# Patient Record
Sex: FEMALE | Race: WHITE | Employment: OTHER | URBAN - METROPOLITAN AREA
[De-identification: names, ages, dates, MRNs, and addresses within clinical notes are randomized per-mention and may not be internally consistent; named-entity substitution may affect disease eponyms.]

---

## 2017-03-15 ENCOUNTER — OFFICE VISIT (OUTPATIENT)
Dept: PULMONOLOGY | Age: 72
End: 2017-03-15
Payer: MEDICARE

## 2017-03-15 VITALS
HEART RATE: 115 BPM | RESPIRATION RATE: 16 BRPM | SYSTOLIC BLOOD PRESSURE: 132 MMHG | DIASTOLIC BLOOD PRESSURE: 69 MMHG | WEIGHT: 216 LBS | HEIGHT: 60 IN | BODY MASS INDEX: 42.41 KG/M2 | OXYGEN SATURATION: 16 %

## 2017-03-15 DIAGNOSIS — J30.9 ALLERGIC RHINITIS, UNSPECIFIED ALLERGIC RHINITIS TRIGGER, UNSPECIFIED RHINITIS SEASONALITY: ICD-10-CM

## 2017-03-15 DIAGNOSIS — Z99.89 OSA ON CPAP: ICD-10-CM

## 2017-03-15 DIAGNOSIS — R05.3 CHRONIC COUGH: ICD-10-CM

## 2017-03-15 DIAGNOSIS — J45.40 MODERATE PERSISTENT ASTHMA WITHOUT COMPLICATION: Primary | ICD-10-CM

## 2017-03-15 DIAGNOSIS — G47.33 OSA ON CPAP: ICD-10-CM

## 2017-03-15 PROCEDURE — G8427 DOCREV CUR MEDS BY ELIG CLIN: HCPCS | Performed by: INTERNAL MEDICINE

## 2017-03-15 PROCEDURE — G8484 FLU IMMUNIZE NO ADMIN: HCPCS | Performed by: INTERNAL MEDICINE

## 2017-03-15 PROCEDURE — 1123F ACP DISCUSS/DSCN MKR DOCD: CPT | Performed by: INTERNAL MEDICINE

## 2017-03-15 PROCEDURE — 3017F COLORECTAL CA SCREEN DOC REV: CPT | Performed by: INTERNAL MEDICINE

## 2017-03-15 PROCEDURE — G8417 CALC BMI ABV UP PARAM F/U: HCPCS | Performed by: INTERNAL MEDICINE

## 2017-03-15 PROCEDURE — 3014F SCREEN MAMMO DOC REV: CPT | Performed by: INTERNAL MEDICINE

## 2017-03-15 PROCEDURE — 1036F TOBACCO NON-USER: CPT | Performed by: INTERNAL MEDICINE

## 2017-03-15 PROCEDURE — G8400 PT W/DXA NO RESULTS DOC: HCPCS | Performed by: INTERNAL MEDICINE

## 2017-03-15 PROCEDURE — 99214 OFFICE O/P EST MOD 30 MIN: CPT | Performed by: INTERNAL MEDICINE

## 2017-03-15 PROCEDURE — 4040F PNEUMOC VAC/ADMIN/RCVD: CPT | Performed by: INTERNAL MEDICINE

## 2017-03-15 PROCEDURE — 1090F PRES/ABSN URINE INCON ASSESS: CPT | Performed by: INTERNAL MEDICINE

## 2017-03-15 RX ORDER — FLUTICASONE PROPIONATE 110 UG/1
2 AEROSOL, METERED RESPIRATORY (INHALATION) 2 TIMES DAILY
Qty: 1 INHALER | Refills: 11 | Status: SHIPPED | OUTPATIENT
Start: 2017-03-15 | End: 2018-03-21 | Stop reason: SDUPTHER

## 2017-03-15 RX ORDER — FLUTICASONE PROPIONATE 50 MCG
2 SPRAY, SUSPENSION (ML) NASAL DAILY
Qty: 1 BOTTLE | Refills: 11 | Status: SHIPPED | OUTPATIENT
Start: 2017-03-15 | End: 2018-03-21 | Stop reason: SDUPTHER

## 2017-03-15 RX ORDER — ACETAMINOPHEN 500 MG
500 TABLET ORAL
COMMUNITY

## 2017-03-15 RX ORDER — ALBUTEROL SULFATE 90 UG/1
2 AEROSOL, METERED RESPIRATORY (INHALATION) EVERY 6 HOURS PRN
Qty: 1 INHALER | Refills: 11 | Status: SHIPPED | OUTPATIENT
Start: 2017-03-15 | End: 2019-03-27 | Stop reason: SDUPTHER

## 2017-03-15 RX ORDER — FLUTICASONE PROPIONATE 50 MCG
2 SPRAY, SUSPENSION (ML) NASAL DAILY
COMMUNITY
Start: 2017-02-01 | End: 2017-03-15 | Stop reason: SDUPTHER

## 2017-05-04 ENCOUNTER — OFFICE VISIT (OUTPATIENT)
Dept: INTERNAL MEDICINE | Age: 72
End: 2017-05-04
Payer: MEDICARE

## 2017-05-04 VITALS
DIASTOLIC BLOOD PRESSURE: 77 MMHG | OXYGEN SATURATION: 95 % | WEIGHT: 211 LBS | SYSTOLIC BLOOD PRESSURE: 133 MMHG | BODY MASS INDEX: 41.43 KG/M2 | HEART RATE: 104 BPM | HEIGHT: 60 IN

## 2017-05-04 DIAGNOSIS — Z12.31 ENCOUNTER FOR SCREENING MAMMOGRAM FOR MALIGNANT NEOPLASM OF BREAST: ICD-10-CM

## 2017-05-04 DIAGNOSIS — J45.909 UNCOMPLICATED ASTHMA, UNSPECIFIED ASTHMA SEVERITY: ICD-10-CM

## 2017-05-04 DIAGNOSIS — Z13.220 SCREENING FOR HYPERLIPIDEMIA: ICD-10-CM

## 2017-05-04 DIAGNOSIS — I65.23 BILATERAL CAROTID ARTERY STENOSIS: ICD-10-CM

## 2017-05-04 DIAGNOSIS — Z12.11 COLON CANCER SCREENING: ICD-10-CM

## 2017-05-04 DIAGNOSIS — I10 ESSENTIAL HYPERTENSION: ICD-10-CM

## 2017-05-04 DIAGNOSIS — Z12.39 BREAST CANCER SCREENING: ICD-10-CM

## 2017-05-04 DIAGNOSIS — Z13.820 OSTEOPOROSIS SCREENING: ICD-10-CM

## 2017-05-04 DIAGNOSIS — E11.9 TYPE 2 DIABETES MELLITUS WITHOUT COMPLICATION, UNSPECIFIED LONG TERM INSULIN USE STATUS: Primary | ICD-10-CM

## 2017-05-04 DIAGNOSIS — R32 URINARY INCONTINENCE, UNSPECIFIED TYPE: ICD-10-CM

## 2017-05-04 LAB — HBA1C MFR BLD: 7.1 %

## 2017-05-04 PROCEDURE — 3046F HEMOGLOBIN A1C LEVEL >9.0%: CPT | Performed by: INTERNAL MEDICINE

## 2017-05-04 PROCEDURE — 1123F ACP DISCUSS/DSCN MKR DOCD: CPT | Performed by: INTERNAL MEDICINE

## 2017-05-04 PROCEDURE — 3017F COLORECTAL CA SCREEN DOC REV: CPT | Performed by: INTERNAL MEDICINE

## 2017-05-04 PROCEDURE — 83036 HEMOGLOBIN GLYCOSYLATED A1C: CPT | Performed by: INTERNAL MEDICINE

## 2017-05-04 PROCEDURE — 4040F PNEUMOC VAC/ADMIN/RCVD: CPT | Performed by: INTERNAL MEDICINE

## 2017-05-04 PROCEDURE — 3014F SCREEN MAMMO DOC REV: CPT | Performed by: INTERNAL MEDICINE

## 2017-05-04 PROCEDURE — G8598 ASA/ANTIPLAT THER USED: HCPCS | Performed by: INTERNAL MEDICINE

## 2017-05-04 PROCEDURE — G8427 DOCREV CUR MEDS BY ELIG CLIN: HCPCS | Performed by: INTERNAL MEDICINE

## 2017-05-04 PROCEDURE — 99203 OFFICE O/P NEW LOW 30 MIN: CPT | Performed by: INTERNAL MEDICINE

## 2017-05-04 PROCEDURE — 1090F PRES/ABSN URINE INCON ASSESS: CPT | Performed by: INTERNAL MEDICINE

## 2017-05-04 PROCEDURE — G8400 PT W/DXA NO RESULTS DOC: HCPCS | Performed by: INTERNAL MEDICINE

## 2017-05-04 PROCEDURE — 0509F URINE INCON PLAN DOCD: CPT | Performed by: INTERNAL MEDICINE

## 2017-05-04 PROCEDURE — G8417 CALC BMI ABV UP PARAM F/U: HCPCS | Performed by: INTERNAL MEDICINE

## 2017-05-04 PROCEDURE — 1036F TOBACCO NON-USER: CPT | Performed by: INTERNAL MEDICINE

## 2017-05-04 ASSESSMENT — ENCOUNTER SYMPTOMS
ABDOMINAL PAIN: 0
SHORTNESS OF BREATH: 0
SORE THROAT: 0
PHOTOPHOBIA: 0
COUGH: 0
BACK PAIN: 1
ABDOMINAL DISTENTION: 0

## 2017-05-04 ASSESSMENT — PATIENT HEALTH QUESTIONNAIRE - PHQ9
SUM OF ALL RESPONSES TO PHQ9 QUESTIONS 1 & 2: 1
1. LITTLE INTEREST OR PLEASURE IN DOING THINGS: 0
2. FEELING DOWN, DEPRESSED OR HOPELESS: 1
SUM OF ALL RESPONSES TO PHQ QUESTIONS 1-9: 1

## 2017-05-18 RX ORDER — CITALOPRAM 40 MG/1
40 TABLET ORAL DAILY
Qty: 30 TABLET | Refills: 3 | Status: SHIPPED | OUTPATIENT
Start: 2017-05-18 | End: 2017-09-22 | Stop reason: SDUPTHER

## 2017-06-10 ENCOUNTER — HOSPITAL ENCOUNTER (OUTPATIENT)
Age: 72
Discharge: HOME | End: 2017-06-10
Payer: MEDICARE

## 2017-06-10 ENCOUNTER — HOSPITAL ENCOUNTER (OUTPATIENT)
Dept: MAMMOGRAPHY | Age: 72
Discharge: HOME | End: 2017-06-10
Payer: MEDICARE

## 2017-06-10 DIAGNOSIS — Z13.820 OSTEOPOROSIS SCREENING: ICD-10-CM

## 2017-06-10 DIAGNOSIS — Z12.39 BREAST CANCER SCREENING: ICD-10-CM

## 2017-06-10 DIAGNOSIS — Z12.31 ENCOUNTER FOR SCREENING MAMMOGRAM FOR MALIGNANT NEOPLASM OF BREAST: ICD-10-CM

## 2017-06-10 LAB
CREATININE URINE: 339.9 MG/DL (ref 28–217)
MICROALBUMIN/CREAT 24H UR: 68 MG/L
MICROALBUMIN/CREAT UR-RTO: 20 MCG/MG CREAT

## 2017-06-10 PROCEDURE — 82043 UR ALBUMIN QUANTITATIVE: CPT

## 2017-06-10 PROCEDURE — 82570 ASSAY OF URINE CREATININE: CPT

## 2017-06-10 PROCEDURE — 77080 DXA BONE DENSITY AXIAL: CPT

## 2017-06-10 PROCEDURE — 77063 BREAST TOMOSYNTHESIS BI: CPT

## 2017-08-08 RX ORDER — GLIMEPIRIDE 4 MG/1
4 TABLET ORAL
Qty: 90 TABLET | Refills: 3 | Status: SHIPPED | OUTPATIENT
Start: 2017-08-08 | End: 2018-09-04 | Stop reason: SDUPTHER

## 2017-09-20 ENCOUNTER — OFFICE VISIT (OUTPATIENT)
Dept: PULMONOLOGY | Age: 72
End: 2017-09-20
Payer: MEDICARE

## 2017-09-20 VITALS
BODY MASS INDEX: 41.03 KG/M2 | DIASTOLIC BLOOD PRESSURE: 64 MMHG | HEART RATE: 108 BPM | RESPIRATION RATE: 20 BRPM | OXYGEN SATURATION: 98 % | HEIGHT: 60 IN | SYSTOLIC BLOOD PRESSURE: 116 MMHG | WEIGHT: 209 LBS

## 2017-09-20 DIAGNOSIS — J30.89 NON-SEASONAL ALLERGIC RHINITIS, UNSPECIFIED ALLERGIC RHINITIS TRIGGER: ICD-10-CM

## 2017-09-20 DIAGNOSIS — R05.3 CHRONIC COUGH: ICD-10-CM

## 2017-09-20 DIAGNOSIS — J45.40 MODERATE PERSISTENT ASTHMA WITHOUT COMPLICATION: Primary | ICD-10-CM

## 2017-09-20 DIAGNOSIS — G47.33 OSA (OBSTRUCTIVE SLEEP APNEA): ICD-10-CM

## 2017-09-20 PROCEDURE — 3014F SCREEN MAMMO DOC REV: CPT | Performed by: INTERNAL MEDICINE

## 2017-09-20 PROCEDURE — G8427 DOCREV CUR MEDS BY ELIG CLIN: HCPCS | Performed by: INTERNAL MEDICINE

## 2017-09-20 PROCEDURE — 1036F TOBACCO NON-USER: CPT | Performed by: INTERNAL MEDICINE

## 2017-09-20 PROCEDURE — 99214 OFFICE O/P EST MOD 30 MIN: CPT | Performed by: INTERNAL MEDICINE

## 2017-09-20 PROCEDURE — 3017F COLORECTAL CA SCREEN DOC REV: CPT | Performed by: INTERNAL MEDICINE

## 2017-09-20 PROCEDURE — 4040F PNEUMOC VAC/ADMIN/RCVD: CPT | Performed by: INTERNAL MEDICINE

## 2017-09-20 PROCEDURE — 90688 IIV4 VACCINE SPLT 0.5 ML IM: CPT | Performed by: INTERNAL MEDICINE

## 2017-09-20 PROCEDURE — G0008 ADMIN INFLUENZA VIRUS VAC: HCPCS | Performed by: INTERNAL MEDICINE

## 2017-09-20 PROCEDURE — G8598 ASA/ANTIPLAT THER USED: HCPCS | Performed by: INTERNAL MEDICINE

## 2017-09-20 PROCEDURE — G8417 CALC BMI ABV UP PARAM F/U: HCPCS | Performed by: INTERNAL MEDICINE

## 2017-09-20 PROCEDURE — 1090F PRES/ABSN URINE INCON ASSESS: CPT | Performed by: INTERNAL MEDICINE

## 2017-09-20 PROCEDURE — G8399 PT W/DXA RESULTS DOCUMENT: HCPCS | Performed by: INTERNAL MEDICINE

## 2017-09-20 PROCEDURE — 1123F ACP DISCUSS/DSCN MKR DOCD: CPT | Performed by: INTERNAL MEDICINE

## 2017-09-23 RX ORDER — CITALOPRAM 40 MG/1
TABLET ORAL
Qty: 30 TABLET | Refills: 3 | Status: SHIPPED | OUTPATIENT
Start: 2017-09-23 | End: 2018-01-30 | Stop reason: SDUPTHER

## 2017-10-02 RX ORDER — SIMVASTATIN 40 MG
40 TABLET ORAL NIGHTLY
Qty: 30 TABLET | Refills: 5 | Status: SHIPPED | OUTPATIENT
Start: 2017-10-02 | End: 2018-02-28 | Stop reason: SDUPTHER

## 2017-10-26 ENCOUNTER — OFFICE VISIT (OUTPATIENT)
Dept: INTERNAL MEDICINE | Age: 72
End: 2017-10-26
Payer: MEDICARE

## 2017-10-26 VITALS
HEART RATE: 99 BPM | BODY MASS INDEX: 41.21 KG/M2 | WEIGHT: 211 LBS | SYSTOLIC BLOOD PRESSURE: 128 MMHG | OXYGEN SATURATION: 96 % | DIASTOLIC BLOOD PRESSURE: 77 MMHG

## 2017-10-26 DIAGNOSIS — I10 ESSENTIAL HYPERTENSION: ICD-10-CM

## 2017-10-26 DIAGNOSIS — E11.9 TYPE 2 DIABETES MELLITUS WITHOUT COMPLICATION, UNSPECIFIED LONG TERM INSULIN USE STATUS: Primary | ICD-10-CM

## 2017-10-26 LAB — HBA1C MFR BLD: 7.4 %

## 2017-10-26 PROCEDURE — G8484 FLU IMMUNIZE NO ADMIN: HCPCS | Performed by: INTERNAL MEDICINE

## 2017-10-26 PROCEDURE — G8598 ASA/ANTIPLAT THER USED: HCPCS | Performed by: INTERNAL MEDICINE

## 2017-10-26 PROCEDURE — G8399 PT W/DXA RESULTS DOCUMENT: HCPCS | Performed by: INTERNAL MEDICINE

## 2017-10-26 PROCEDURE — G8427 DOCREV CUR MEDS BY ELIG CLIN: HCPCS | Performed by: INTERNAL MEDICINE

## 2017-10-26 PROCEDURE — 1036F TOBACCO NON-USER: CPT | Performed by: INTERNAL MEDICINE

## 2017-10-26 PROCEDURE — 1090F PRES/ABSN URINE INCON ASSESS: CPT | Performed by: INTERNAL MEDICINE

## 2017-10-26 PROCEDURE — 1123F ACP DISCUSS/DSCN MKR DOCD: CPT | Performed by: INTERNAL MEDICINE

## 2017-10-26 PROCEDURE — G8417 CALC BMI ABV UP PARAM F/U: HCPCS | Performed by: INTERNAL MEDICINE

## 2017-10-26 PROCEDURE — 83036 HEMOGLOBIN GLYCOSYLATED A1C: CPT | Performed by: INTERNAL MEDICINE

## 2017-10-26 PROCEDURE — 3045F PR MOST RECENT HEMOGLOBIN A1C LEVEL 7.0-9.0%: CPT | Performed by: INTERNAL MEDICINE

## 2017-10-26 PROCEDURE — 4040F PNEUMOC VAC/ADMIN/RCVD: CPT | Performed by: INTERNAL MEDICINE

## 2017-10-26 PROCEDURE — 3014F SCREEN MAMMO DOC REV: CPT | Performed by: INTERNAL MEDICINE

## 2017-10-26 PROCEDURE — 3017F COLORECTAL CA SCREEN DOC REV: CPT | Performed by: INTERNAL MEDICINE

## 2017-10-26 PROCEDURE — 99214 OFFICE O/P EST MOD 30 MIN: CPT | Performed by: INTERNAL MEDICINE

## 2017-10-26 ASSESSMENT — ENCOUNTER SYMPTOMS
ALLERGIC/IMMUNOLOGIC NEGATIVE: 1
GASTROINTESTINAL NEGATIVE: 1
EYES NEGATIVE: 1
BACK PAIN: 1
COUGH: 1

## 2017-10-26 NOTE — PROGRESS NOTES
[address] [facility]   Progress Note      Chronic Disease Visit Information    BP Readings from Last 3 Encounters:   09/20/17 116/64   05/04/17 133/77   03/15/17 132/69          Hemoglobin A1C (%)   Date Value   05/04/2017 7.1   01/28/2014 7.2 (H)     Microalb/Crt. Ratio (mcg/mg creat)   Date Value   06/10/2017 20     LDL Cholesterol (mg/dL)   Date Value   01/28/2014 66     HDL (mg/dL)   Date Value   01/28/2014 41 (L)     BUN (mg/dL)   Date Value   01/28/2014 17     CREATININE (mg/dL)   Date Value   01/28/2014 1.00     Glucose (mg/dL)   Date Value   01/28/2014 124 (H)   09/30/2011 85            Have you changed or started any medications since your last visit including any over-the-counter medicines, vitamins, or herbal medicines? no   Are you having any side effects from any of your medications? -  no  Have you stopped taking any of your medications? Is so, why? -  no    Have you seen any other physician or provider since your last visit? Yes - Records Obtained  Have you had any other diagnostic tests since your last visit? Yes - Records Obtained  Have you been seen in the emergency room and/or had an admission to a hospital since we last saw you? No  Have you had your annual diabetic retinal (eye) exam? Yes - Records Obtained  Have you had your routine dental cleaning in the past 6 months? no    Have you activated your [employer] account? If not, what are your barriers?  Yes     Patient Care Team:  Lnih Hussein MD as PCP - General (Internal Medicine)  Kade Manzanares MD as Consulting Physician (Pulmonology)         Medical History Review  Past Medical, Family, and Social History reviewed and does contribute to the patient presenting condition    Health Maintenance   Topic Date Due    Diabetic foot exam  07/03/1955    Diabetic retinal exam  07/03/1955    Zostavax vaccine  07/03/2005    Pneumococcal low/med risk (1 of 2 - PCV13) 07/03/2010    Lipid screen 01/28/2015    DTaP/Tdap/Td vaccine (1 - Tdap) 01/26/2018 (Originally 7/3/1964)    Hepatitis C screen  01/26/2018 (Originally 1945)    Diabetic hemoglobin A1C test  05/04/2018    Diabetic microalbuminuria test  06/10/2018    Breast cancer screen  06/10/2019    Colon cancer screen colonoscopy  07/07/2027    DEXA (modify frequency per FRAX score)  Completed    Flu vaccine  Completed         Date of patient's visit: 10/26/2017  Patient's Name:  Claudette Collado                   YOB: 1945        PCP:  Savannah Gonzalez MD    Claudette Collado is a 67 y.o. female who presents for   Chief Complaint   Patient presents with    6 Month Follow-Up    and follow up of chronic medical problems. Patient Active Problem List   Diagnosis    S/P total knee arthroplasty    Diabetes mellitus ([address]    Hypertension    Asthma    Arthritis    Hyperkalemia       HISTORY OF PRESENT ILLNESS:    History was obtained from the patient. Treatment Adherence:   Medication compliance:  compliant all of the time  Diet compliance:  compliant all of the time  Weight trend: stable  Current exercise: no regular exercise  Barriers: none    Diabetes Mellitus Type 2: Current symptoms/problems include none. Home blood sugar records: patient does not test  Any episodes of hypoglycemia? no  Eye exam current (within one year): unknown  Tobacco history: She  reports that she has quit smoking. She has quit using smokeless tobacco.   Daily Aspirin? Yes    Hypertension:  Home blood pressure monitoring: No.  She is adherent to a low sodium diet. Patient denies chest pain and shortness of breath. Antihypertensive medication side effects: no medication side effects noted. Use of agents associated with hypertension: none. Hyperlipidemia:  No new myalgias or GI upset on simvastatin (Zocor).        Lab Results   Component Value Date    LABA1C 7.1 05/04/2017    LABA1C 7.2 (H) 01/28/2014     Lab Results   Component Value Date    LABMICR 20 appearing, and in no distress  Mental status - alert, oriented to person, place, and time  Eyes - pupils equal and reactive, extraocular eye movements intact  Chest - clear to auscultation, no wheezes, rales or rhonchi, symmetric air entry  Heart - normal rate, regular rhythm, normal S1, S2, no murmurs, rubs, clicks or gallops  Abdomen - soft, nontender, nondistended, no masses or organomegaly  Back exam - full range of motion, no tenderness, palpable spasm or pain on motion  Neurological - alert, oriented, normal speech, no focal findings or movement disorder noted  Musculoskeletal - no joint tenderness, deformity or swelling  Extremities - peripheral pulses normal, no pedal edema, no clubbing or cyanosis    LABORATORY FINDINGS:    CBC:   Lab Results   Component Value Date    WBC 10.6 02/04/2014    HGB 9.9 02/04/2014     02/04/2014     BMP:    Lab Results   Component Value Date     01/28/2014    K 4.6 01/28/2014     01/28/2014    CO2 30 01/28/2014    BUN 17 01/28/2014    CREATININE 1.00 01/28/2014    GLUCOSE 124 01/28/2014    GLUCOSE 85 09/30/2011     Hemoglobin A1C:   Lab Results   Component Value Date    LABA1C 7.1 05/04/2017     Microalbumin Urine:   Lab Results   Component Value Date    MICROALBUR 68 06/10/2017     Lipid profile:   Lab Results   Component Value Date    CHOL 125 01/28/2014    TRIG 89 01/28/2014    HDL 41 01/28/2014     Thyroid functions: No results found for: TSH   Hepatic functions:   Lab Results   Component Value Date    LABALBU 3.3 01/28/2014     Urine Analysis: No results found for: [address] [place]:      Health Maintenance Due   Topic Date Due    Diabetic foot exam  07/03/1955    Diabetic retinal exam  07/03/1955    Pneumococcal low/med risk (1 of 2 - PCV13) 07/03/2010    Lipid screen  01/28/2015       ASSESSMENT AND PLAN:      1.  Type 2 diabetes mellitus without complication, unspecified long term insulin use status (HCC)  Worsened slightly with 7.4 A1C  The patient is asked to make an attempt to improve diet and exercise patterns to aid in medical management of this problem. 2. Essential hypertension  controlled     rtc in 6 months    FOLLOW UP:   1. Marta Speaks received counseling on the following healthy behaviors: nutrition and exercise    2. Reviewed prior labs and health maintenance. 3.  Discussed use, benefit, and side effects of prescribed medications. Barriers to medication compliance addressed. All patient questions answered. Pt voiced understanding. 4.  Continue current medications, diet and exercise. No orders of the defined types were placed in this encounter. Completed Refills               Requested Prescriptions      No prescriptions requested or ordered in this encounter       5. Patient given educational materials - see patient instructions    6. Was a self-tracking handout given in paper form or via [employer]? Yes  If yes, see orders or list here. No orders of the defined types were placed in this encounter. Return in about 6 months (around 4/26/2018). Patient voiced understanding and agreed to treatment plan. This note is created with the assistance of a speech-recognition program. While intending to generate a document that actually reflects the content of the visit, the document can still have some mistakes which may not have been identified and corrected by editing.     Dr Harris Hollis MD, [address]  Associate , Department of Internal Medicine  [address] [place] [address]  Attending [address] [place] [address], [address] [place] [address]                   10/26/2017, 3:07 PM

## 2017-12-14 RX ORDER — VALSARTAN AND HYDROCHLOROTHIAZIDE 160; 12.5 MG/1; MG/1
1 TABLET, FILM COATED ORAL DAILY
Qty: 90 TABLET | Refills: 0 | Status: SHIPPED | OUTPATIENT
Start: 2017-12-14 | End: 2018-03-23 | Stop reason: SDUPTHER

## 2017-12-14 NOTE — TELEPHONE ENCOUNTER
Requested Prescriptions     Pending Prescriptions Disp Refills    valsartan-hydrochlorothiazide (DIOVAN HCT) 160-12.5 MG per tablet 90 tablet 0     Sig: Take 1 tablet by mouth daily     Next Visit Date:  Future Appointments  Date Time Provider Eulalia Chongi   3/21/2018 1:45 PM Chandan Phelps MD Resp Spec [facility]   4/26/2018 11:10 AM Bogdan Roy MD [facility] [address] Maintenance   Topic Date Due    Diabetic foot exam  07/03/1955    Diabetic retinal exam  07/03/1955    Lipid screen  01/28/2015    Pneumococcal low/med risk (2 of 2 - PPSV23) 10/26/2017    DTaP/Tdap/Td vaccine (1 - Tdap) 01/26/2018 (Originally 7/3/1964)    Hepatitis C screen  01/26/2018 (Originally 1945)    Zostavax vaccine  10/26/2018 (Originally 7/3/2005)    Diabetic microalbuminuria test  06/10/2018    Diabetic hemoglobin A1C test  10/26/2018    Breast cancer screen  06/10/2019    Colon cancer screen colonoscopy  07/07/2027    DEXA (modify frequency per FRAX score)  Completed    Flu vaccine  Completed       Hemoglobin A1C (%)   Date Value   10/26/2017 7.4   05/04/2017 7.1   01/28/2014 7.2 (H)             ( goal A1C is < 7)   Microalb/Crt.  Ratio (mcg/mg creat)   Date Value   06/10/2017 20     LDL Cholesterol (mg/dL)   Date Value   01/28/2014 66       (goal LDL is <100)   BUN (mg/dL)   Date Value   01/28/2014 17     BP Readings from Last 3 Encounters:   10/26/17 128/77   09/20/17 116/64   05/04/17 133/77          (goal 120/80)    All Future Testing planned in [facility]  Lab Frequency Next Occurrence   Lipid Panel Once 04/04/2018               Patient Active Problem List:     S/P total knee arthroplasty     Diabetes mellitus ([facility]     Hypertension     Asthma     Arthritis     Hyperkalemia

## 2018-01-30 RX ORDER — CITALOPRAM 40 MG/1
TABLET ORAL
Qty: 30 TABLET | Refills: 5 | Status: SHIPPED | OUTPATIENT
Start: 2018-01-30 | End: 2018-08-13 | Stop reason: SDUPTHER

## 2018-01-30 NOTE — TELEPHONE ENCOUNTER
Health Maintenance   Topic Date Due    Hepatitis C screen  1945    Diabetic foot exam  07/03/1955    Diabetic retinal exam  07/03/1955    DTaP/Tdap/Td vaccine (1 - Tdap) 07/03/1964    Lipid screen  01/28/2015    Potassium monitoring  01/28/2015    Creatinine monitoring  01/28/2015    Pneumococcal low/med risk (2 of 2 - PPSV23) 10/26/2017    Zostavax vaccine  10/26/2018 (Originally 7/3/2005)    Diabetic microalbuminuria test  06/10/2018    A1C test (Diabetic or Prediabetic)  10/26/2018    Breast cancer screen  06/10/2019    Colon cancer screen colonoscopy  07/07/2027    DEXA (modify frequency per FRAX score)  Completed    Flu vaccine  Completed             (applicable per patient's age: Cancer Screenings, Depression Screening, Fall Risk Screening, Immunizations)    Hemoglobin A1C (%)   Date Value   10/26/2017 7.4   05/04/2017 7.1   01/28/2014 7.2 (H)     Microalb/Crt.  Ratio (mcg/mg creat)   Date Value   06/10/2017 20     LDL Cholesterol (mg/dL)   Date Value   01/28/2014 66     BUN (mg/dL)   Date Value   01/28/2014 17      (goal A1C is < 7)   (goal LDL is <100) need 30-50% reduction from baseline     BP Readings from Last 3 Encounters:   10/26/17 128/77   09/20/17 116/64   05/04/17 133/77    (goal /80)      All Future Testing planned in [facility]:  Lab Frequency Next Occurrence   Lipid Panel Once 04/04/2018       Next Visit Date:  Future Appointments  Date Time Provider Eulalia Chongi   3/21/2018 1:45 PM Lyla Toure MD Resp Spec [facility]   4/26/2018 11:10 AM Richar Chen MD [facility]            Patient Active Problem List:     S/P total knee arthroplasty     Diabetes mellitus ([address]     Hypertension     Asthma     Arthritis     Hyperkalemia

## 2018-02-28 RX ORDER — SIMVASTATIN 40 MG
40 TABLET ORAL NIGHTLY
Qty: 90 TABLET | Refills: 3 | Status: SHIPPED | OUTPATIENT
Start: 2018-02-28 | End: 2019-04-15 | Stop reason: SDUPTHER

## 2018-03-21 ENCOUNTER — OFFICE VISIT (OUTPATIENT)
Dept: PULMONOLOGY | Age: 73
End: 2018-03-21
Payer: MEDICARE

## 2018-03-21 VITALS
WEIGHT: 202.2 LBS | BODY MASS INDEX: 39.7 KG/M2 | RESPIRATION RATE: 14 BRPM | TEMPERATURE: 97.6 F | OXYGEN SATURATION: 97 % | HEIGHT: 60 IN | DIASTOLIC BLOOD PRESSURE: 76 MMHG | HEART RATE: 99 BPM | SYSTOLIC BLOOD PRESSURE: 132 MMHG

## 2018-03-21 DIAGNOSIS — J30.9 ALLERGIC RHINITIS, UNSPECIFIED CHRONICITY, UNSPECIFIED SEASONALITY, UNSPECIFIED TRIGGER: ICD-10-CM

## 2018-03-21 DIAGNOSIS — G47.33 OBSTRUCTIVE SLEEP APNEA: ICD-10-CM

## 2018-03-21 DIAGNOSIS — J45.30 MILD PERSISTENT ASTHMA WITHOUT COMPLICATION: Primary | ICD-10-CM

## 2018-03-21 PROCEDURE — G8417 CALC BMI ABV UP PARAM F/U: HCPCS | Performed by: INTERNAL MEDICINE

## 2018-03-21 PROCEDURE — G8482 FLU IMMUNIZE ORDER/ADMIN: HCPCS | Performed by: INTERNAL MEDICINE

## 2018-03-21 PROCEDURE — 4040F PNEUMOC VAC/ADMIN/RCVD: CPT | Performed by: INTERNAL MEDICINE

## 2018-03-21 PROCEDURE — 1123F ACP DISCUSS/DSCN MKR DOCD: CPT | Performed by: INTERNAL MEDICINE

## 2018-03-21 PROCEDURE — 3014F SCREEN MAMMO DOC REV: CPT | Performed by: INTERNAL MEDICINE

## 2018-03-21 PROCEDURE — 1036F TOBACCO NON-USER: CPT | Performed by: INTERNAL MEDICINE

## 2018-03-21 PROCEDURE — G8427 DOCREV CUR MEDS BY ELIG CLIN: HCPCS | Performed by: INTERNAL MEDICINE

## 2018-03-21 PROCEDURE — 99214 OFFICE O/P EST MOD 30 MIN: CPT | Performed by: INTERNAL MEDICINE

## 2018-03-21 PROCEDURE — G8399 PT W/DXA RESULTS DOCUMENT: HCPCS | Performed by: INTERNAL MEDICINE

## 2018-03-21 PROCEDURE — 1090F PRES/ABSN URINE INCON ASSESS: CPT | Performed by: INTERNAL MEDICINE

## 2018-03-21 PROCEDURE — 3017F COLORECTAL CA SCREEN DOC REV: CPT | Performed by: INTERNAL MEDICINE

## 2018-03-21 RX ORDER — FLUTICASONE PROPIONATE 110 UG/1
2 AEROSOL, METERED RESPIRATORY (INHALATION) 2 TIMES DAILY
Qty: 1 INHALER | Refills: 11 | Status: SHIPPED | OUTPATIENT
Start: 2018-03-21 | End: 2019-03-27 | Stop reason: SDUPTHER

## 2018-03-21 RX ORDER — FLUTICASONE PROPIONATE 50 MCG
2 SPRAY, SUSPENSION (ML) NASAL DAILY
Qty: 1 BOTTLE | Refills: 11 | Status: SHIPPED | OUTPATIENT
Start: 2018-03-21 | End: 2019-03-27 | Stop reason: SDUPTHER

## 2018-03-21 RX ORDER — ALBUTEROL SULFATE 90 UG/1
2 AEROSOL, METERED RESPIRATORY (INHALATION) EVERY 6 HOURS PRN
Qty: 1 INHALER | Refills: 5 | Status: SHIPPED | OUTPATIENT
Start: 2018-03-21 | End: 2018-04-30 | Stop reason: SDUPTHER

## 2018-03-21 NOTE — PROGRESS NOTES
Value Ref Range Status   01/28/2014 105 98 - 110 mmol/L Final     CO2   Date Value Ref Range Status   01/28/2014 30 20 - 31 mmol/L Final     BUN   Date Value Ref Range Status   01/28/2014 17 5 - 20 mg/dL Final     CREATININE   Date Value Ref Range Status   01/28/2014 1.00 0.4 - 1.0 mg/dL Final   01/27/2014 1.08 (H) 0.4 - 1.0 mg/dL Final   01/26/2014 1.29 (H) 0.4 - 1.0 mg/dL Final     Glucose   Date Value Ref Range Status   01/28/2014 124 (H) 74 - 106 mg/dL Final   09/30/2011 85 74 - 106 mg/dL Final     S. Calcium:   Calcium   Date Value Ref Range Status   01/28/2014 8.7 8.6 - 10.4 mg/dL Final     S. Ionized Calcium: No results found for: IONCA  S. Magnesium: No results found for: MG  S. Phosphorus:   Phosphorus   Date Value Ref Range Status   01/28/2014 2.7 2.5 - 4.5 mg/dL Final     S. Glucose:   POC Glucose   Date Value Ref Range Status   01/27/2014 153 (H) 65 - 105 mg/dL Final   01/27/2014 112 (H) 65 - 105 mg/dL Final   01/27/2014 116 (H) 65 - 105 mg/dL Final     Glycosylated hemoglobin A1C:   Hemoglobin A1C   Date Value Ref Range Status   10/26/2017 7.4 % Final       Pulmonary Functions Testing Results:    3/19/14: FEV1 1.63  96%, FVC 2.16  89%, QJQ6QBI 107 %, TLC 4.34  108% , DLCO 16.49 98%    Blood Gases: No results found for: PH, PCO2, PO2, HCO3, O2SAT    Radiological reports:    CXR  normal chest X-ray in 2014    CT Scans        ECHO:     Immunization History   Administered Date(s) Administered    Influenza, Quadv, 3 Years and older, IM 09/20/2017    Influenza, Triv, 3 years and older, IM 09/14/2016    Pneumococcal 13-valent Conjugate ([id number]) 10/26/2016    Pneumococcal Polysaccharide ([id number]) 10/08/2008       Assessment:    ICD-10-CM ICD-9-CM    1. Mild persistent asthma without complication I38.55 540.30    2. Obstructive sleep apnea G47.33 327.23    3.  Allergic rhinitis, unspecified chronicity, unspecified seasonality, unspecified trigger J30.9 477.9          Plan:    Continue Flovent at

## 2018-03-26 RX ORDER — VALSARTAN AND HYDROCHLOROTHIAZIDE 160; 12.5 MG/1; MG/1
TABLET, FILM COATED ORAL
Qty: 90 TABLET | Refills: 3 | Status: SHIPPED | OUTPATIENT
Start: 2018-03-26 | End: 2019-04-18 | Stop reason: SDUPTHER

## 2018-04-17 ENCOUNTER — HOSPITAL ENCOUNTER (OUTPATIENT)
Age: 73
Discharge: HOME | End: 2018-04-17
Payer: MEDICARE

## 2018-04-17 DIAGNOSIS — Z13.220 SCREENING FOR HYPERLIPIDEMIA: ICD-10-CM

## 2018-04-17 LAB
CHOLESTEROL/HDL RATIO: 1.9
CHOLESTEROL: 147 MG/DL
HDLC SERPL-MCNC: 78 MG/DL
LDL CHOLESTEROL: 46 MG/DL (ref 0–130)
TRIGL SERPL-MCNC: 114 MG/DL
VLDLC SERPL CALC-MCNC: NORMAL MG/DL (ref 1–30)

## 2018-04-17 PROCEDURE — 80061 LIPID PANEL: CPT

## 2018-04-17 PROCEDURE — 36415 COLL VENOUS BLD VENIPUNCTURE: CPT

## 2018-04-27 PROBLEM — M65.30 ACQUIRED TRIGGER FINGER: Status: ACTIVE | Noted: 2018-04-27

## 2018-04-27 PROBLEM — M17.9 OSTEOARTHRITIS OF KNEE: Status: ACTIVE | Noted: 2018-04-27

## 2018-04-30 ENCOUNTER — OFFICE VISIT (OUTPATIENT)
Dept: INTERNAL MEDICINE | Age: 73
End: 2018-04-30
Payer: MEDICARE

## 2018-04-30 VITALS
RESPIRATION RATE: 12 BRPM | SYSTOLIC BLOOD PRESSURE: 111 MMHG | DIASTOLIC BLOOD PRESSURE: 74 MMHG | BODY MASS INDEX: 40.44 KG/M2 | HEIGHT: 60 IN | OXYGEN SATURATION: 97 % | HEART RATE: 88 BPM | WEIGHT: 206 LBS

## 2018-04-30 DIAGNOSIS — I10 ESSENTIAL HYPERTENSION: ICD-10-CM

## 2018-04-30 DIAGNOSIS — Z23 NEED FOR PROPHYLACTIC VACCINATION AND INOCULATION AGAINST VARICELLA: ICD-10-CM

## 2018-04-30 DIAGNOSIS — Z12.39 BREAST CANCER SCREENING: ICD-10-CM

## 2018-04-30 DIAGNOSIS — M17.0 OSTEOARTHRITIS OF BOTH KNEES, UNSPECIFIED OSTEOARTHRITIS TYPE: ICD-10-CM

## 2018-04-30 DIAGNOSIS — E11.9 TYPE 2 DIABETES MELLITUS WITHOUT COMPLICATION, UNSPECIFIED LONG TERM INSULIN USE STATUS: Primary | ICD-10-CM

## 2018-04-30 LAB
GLUCOSE BLD-MCNC: 150 MG/DL
HBA1C MFR BLD: 6.7 %

## 2018-04-30 PROCEDURE — 82962 GLUCOSE BLOOD TEST: CPT | Performed by: INTERNAL MEDICINE

## 2018-04-30 PROCEDURE — G8417 CALC BMI ABV UP PARAM F/U: HCPCS | Performed by: INTERNAL MEDICINE

## 2018-04-30 PROCEDURE — 2022F DILAT RTA XM EVC RTNOPTHY: CPT | Performed by: INTERNAL MEDICINE

## 2018-04-30 PROCEDURE — 1036F TOBACCO NON-USER: CPT | Performed by: INTERNAL MEDICINE

## 2018-04-30 PROCEDURE — 1090F PRES/ABSN URINE INCON ASSESS: CPT | Performed by: INTERNAL MEDICINE

## 2018-04-30 PROCEDURE — 3017F COLORECTAL CA SCREEN DOC REV: CPT | Performed by: INTERNAL MEDICINE

## 2018-04-30 PROCEDURE — G8399 PT W/DXA RESULTS DOCUMENT: HCPCS | Performed by: INTERNAL MEDICINE

## 2018-04-30 PROCEDURE — 1123F ACP DISCUSS/DSCN MKR DOCD: CPT | Performed by: INTERNAL MEDICINE

## 2018-04-30 PROCEDURE — 3044F HG A1C LEVEL LT 7.0%: CPT | Performed by: INTERNAL MEDICINE

## 2018-04-30 PROCEDURE — 99214 OFFICE O/P EST MOD 30 MIN: CPT | Performed by: INTERNAL MEDICINE

## 2018-04-30 PROCEDURE — 83036 HEMOGLOBIN GLYCOSYLATED A1C: CPT | Performed by: INTERNAL MEDICINE

## 2018-04-30 PROCEDURE — 4040F PNEUMOC VAC/ADMIN/RCVD: CPT | Performed by: INTERNAL MEDICINE

## 2018-04-30 PROCEDURE — G8427 DOCREV CUR MEDS BY ELIG CLIN: HCPCS | Performed by: INTERNAL MEDICINE

## 2018-04-30 ASSESSMENT — ENCOUNTER SYMPTOMS
EYES NEGATIVE: 1
WHEEZING: 1
GASTROINTESTINAL NEGATIVE: 1
ALLERGIC/IMMUNOLOGIC NEGATIVE: 1
BACK PAIN: 1

## 2018-06-21 ENCOUNTER — HOSPITAL ENCOUNTER (OUTPATIENT)
Dept: MAMMOGRAPHY | Age: 73
Discharge: HOME | End: 2018-06-23
Payer: MEDICARE

## 2018-06-21 DIAGNOSIS — Z12.39 BREAST CANCER SCREENING: ICD-10-CM

## 2018-06-21 PROCEDURE — 77063 BREAST TOMOSYNTHESIS BI: CPT

## 2018-08-13 RX ORDER — CITALOPRAM 40 MG/1
TABLET ORAL
Qty: 30 TABLET | Refills: 4 | Status: SHIPPED | OUTPATIENT
Start: 2018-08-13 | End: 2019-01-18 | Stop reason: SDUPTHER

## 2018-09-04 RX ORDER — GLIMEPIRIDE 4 MG/1
TABLET ORAL
Qty: 90 TABLET | Refills: 3 | Status: SHIPPED | OUTPATIENT
Start: 2018-09-04 | End: 2019-10-11 | Stop reason: SDUPTHER

## 2018-09-26 ENCOUNTER — OFFICE VISIT (OUTPATIENT)
Dept: PULMONOLOGY | Age: 73
End: 2018-09-26
Payer: MEDICARE

## 2018-09-26 VITALS
BODY MASS INDEX: 40.05 KG/M2 | OXYGEN SATURATION: 95 % | HEART RATE: 111 BPM | WEIGHT: 204 LBS | SYSTOLIC BLOOD PRESSURE: 108 MMHG | TEMPERATURE: 99.3 F | HEIGHT: 60 IN | DIASTOLIC BLOOD PRESSURE: 65 MMHG

## 2018-09-26 DIAGNOSIS — G47.33 OBSTRUCTIVE SLEEP APNEA: ICD-10-CM

## 2018-09-26 DIAGNOSIS — R06.09 DYSPNEA ON EXERTION: ICD-10-CM

## 2018-09-26 DIAGNOSIS — J45.30 MILD PERSISTENT ASTHMA WITHOUT COMPLICATION: Primary | ICD-10-CM

## 2018-09-26 PROCEDURE — 1101F PT FALLS ASSESS-DOCD LE1/YR: CPT | Performed by: INTERNAL MEDICINE

## 2018-09-26 PROCEDURE — G8417 CALC BMI ABV UP PARAM F/U: HCPCS | Performed by: INTERNAL MEDICINE

## 2018-09-26 PROCEDURE — 99214 OFFICE O/P EST MOD 30 MIN: CPT | Performed by: INTERNAL MEDICINE

## 2018-09-26 PROCEDURE — 4040F PNEUMOC VAC/ADMIN/RCVD: CPT | Performed by: INTERNAL MEDICINE

## 2018-09-26 PROCEDURE — 3017F COLORECTAL CA SCREEN DOC REV: CPT | Performed by: INTERNAL MEDICINE

## 2018-09-26 PROCEDURE — G0008 ADMIN INFLUENZA VIRUS VAC: HCPCS | Performed by: INTERNAL MEDICINE

## 2018-09-26 PROCEDURE — G8399 PT W/DXA RESULTS DOCUMENT: HCPCS | Performed by: INTERNAL MEDICINE

## 2018-09-26 PROCEDURE — 1090F PRES/ABSN URINE INCON ASSESS: CPT | Performed by: INTERNAL MEDICINE

## 2018-09-26 PROCEDURE — 1123F ACP DISCUSS/DSCN MKR DOCD: CPT | Performed by: INTERNAL MEDICINE

## 2018-09-26 PROCEDURE — G8427 DOCREV CUR MEDS BY ELIG CLIN: HCPCS | Performed by: INTERNAL MEDICINE

## 2018-09-26 PROCEDURE — 90688 IIV4 VACCINE SPLT 0.5 ML IM: CPT | Performed by: INTERNAL MEDICINE

## 2018-09-26 PROCEDURE — 1036F TOBACCO NON-USER: CPT | Performed by: INTERNAL MEDICINE

## 2018-09-26 ASSESSMENT — SLEEP AND FATIGUE QUESTIONNAIRES
HOW LIKELY ARE YOU TO NOD OFF OR FALL ASLEEP IN A CAR, WHILE STOPPED FOR A FEW MINUTES IN TRAFFIC: 0
HOW LIKELY ARE YOU TO NOD OFF OR FALL ASLEEP WHEN YOU ARE A PASSENGER IN A CAR FOR AN HOUR WITHOUT A BREAK: 0
HOW LIKELY ARE YOU TO NOD OFF OR FALL ASLEEP WHILE SITTING AND READING: 0
ESS TOTAL SCORE: 3
HOW LIKELY ARE YOU TO NOD OFF OR FALL ASLEEP WHILE SITTING AND TALKING TO SOMEONE: 0
HOW LIKELY ARE YOU TO NOD OFF OR FALL ASLEEP WHILE SITTING INACTIVE IN A PUBLIC PLACE: 0
HOW LIKELY ARE YOU TO NOD OFF OR FALL ASLEEP WHILE WATCHING TV: 0
HOW LIKELY ARE YOU TO NOD OFF OR FALL ASLEEP WHILE SITTING QUIETLY AFTER LUNCH WITHOUT ALCOHOL: 0
HOW LIKELY ARE YOU TO NOD OFF OR FALL ASLEEP WHILE LYING DOWN TO REST IN THE AFTERNOON WHEN CIRCUMSTANCES PERMIT: 3

## 2018-10-23 ENCOUNTER — HOSPITAL ENCOUNTER (OUTPATIENT)
Age: 73
Discharge: HOME | End: 2018-10-23
Payer: MEDICARE

## 2018-10-23 DIAGNOSIS — I10 ESSENTIAL HYPERTENSION: ICD-10-CM

## 2018-10-23 DIAGNOSIS — E11.9 TYPE 2 DIABETES MELLITUS WITHOUT COMPLICATION (HCC): ICD-10-CM

## 2018-10-23 LAB
ALBUMIN SERPL-MCNC: 4.2 G/DL (ref 3.5–5.2)
ALBUMIN/GLOBULIN RATIO: 1.3 (ref 1–2.5)
ALP BLD-CCNC: 55 U/L (ref 35–104)
ALT SERPL-CCNC: 22 U/L (ref 5–33)
ANION GAP SERPL CALCULATED.3IONS-SCNC: 19 MMOL/L (ref 9–17)
AST SERPL-CCNC: 27 U/L
BILIRUB SERPL-MCNC: 0.45 MG/DL (ref 0.3–1.2)
BUN BLDV-MCNC: 20 MG/DL (ref 8–23)
BUN/CREAT BLD: ABNORMAL (ref 9–20)
CALCIUM SERPL-MCNC: 9.5 MG/DL (ref 8.6–10.4)
CHLORIDE BLD-SCNC: 102 MMOL/L (ref 98–107)
CO2: 21 MMOL/L (ref 20–31)
CREAT SERPL-MCNC: 1.23 MG/DL (ref 0.5–0.9)
GFR AFRICAN AMERICAN: 52 ML/MIN
GFR NON-AFRICAN AMERICAN: 43 ML/MIN
GFR SERPL CREATININE-BSD FRML MDRD: ABNORMAL ML/MIN/{1.73_M2}
GFR SERPL CREATININE-BSD FRML MDRD: ABNORMAL ML/MIN/{1.73_M2}
GLUCOSE BLD-MCNC: 140 MG/DL (ref 70–99)
POTASSIUM SERPL-SCNC: 4.2 MMOL/L (ref 3.7–5.3)
SODIUM BLD-SCNC: 142 MMOL/L (ref 135–144)
TOTAL PROTEIN: 7.5 G/DL (ref 6.4–8.3)

## 2018-10-23 PROCEDURE — 36415 COLL VENOUS BLD VENIPUNCTURE: CPT

## 2018-10-23 PROCEDURE — 80053 COMPREHEN METABOLIC PANEL: CPT

## 2018-10-29 ENCOUNTER — OFFICE VISIT (OUTPATIENT)
Dept: INTERNAL MEDICINE | Age: 73
End: 2018-10-29
Payer: MEDICARE

## 2018-10-29 VITALS
SYSTOLIC BLOOD PRESSURE: 124 MMHG | DIASTOLIC BLOOD PRESSURE: 69 MMHG | WEIGHT: 205 LBS | HEART RATE: 87 BPM | HEIGHT: 60 IN | BODY MASS INDEX: 40.25 KG/M2

## 2018-10-29 DIAGNOSIS — N17.9 AKI (ACUTE KIDNEY INJURY) (HCC): ICD-10-CM

## 2018-10-29 DIAGNOSIS — F32.89 OTHER DEPRESSION: ICD-10-CM

## 2018-10-29 DIAGNOSIS — I10 ESSENTIAL HYPERTENSION: ICD-10-CM

## 2018-10-29 DIAGNOSIS — E11.9 TYPE 2 DIABETES MELLITUS WITHOUT COMPLICATION, UNSPECIFIED WHETHER LONG TERM INSULIN USE (HCC): Primary | ICD-10-CM

## 2018-10-29 LAB — HBA1C MFR BLD: 7.1 %

## 2018-10-29 PROCEDURE — 3045F PR MOST RECENT HEMOGLOBIN A1C LEVEL 7.0-9.0%: CPT | Performed by: INTERNAL MEDICINE

## 2018-10-29 PROCEDURE — 1123F ACP DISCUSS/DSCN MKR DOCD: CPT | Performed by: INTERNAL MEDICINE

## 2018-10-29 PROCEDURE — 1036F TOBACCO NON-USER: CPT | Performed by: INTERNAL MEDICINE

## 2018-10-29 PROCEDURE — G8399 PT W/DXA RESULTS DOCUMENT: HCPCS | Performed by: INTERNAL MEDICINE

## 2018-10-29 PROCEDURE — 2022F DILAT RTA XM EVC RTNOPTHY: CPT | Performed by: INTERNAL MEDICINE

## 2018-10-29 PROCEDURE — G8417 CALC BMI ABV UP PARAM F/U: HCPCS | Performed by: INTERNAL MEDICINE

## 2018-10-29 PROCEDURE — G8427 DOCREV CUR MEDS BY ELIG CLIN: HCPCS | Performed by: INTERNAL MEDICINE

## 2018-10-29 PROCEDURE — G8482 FLU IMMUNIZE ORDER/ADMIN: HCPCS | Performed by: INTERNAL MEDICINE

## 2018-10-29 PROCEDURE — 3017F COLORECTAL CA SCREEN DOC REV: CPT | Performed by: INTERNAL MEDICINE

## 2018-10-29 PROCEDURE — 99211 OFF/OP EST MAY X REQ PHY/QHP: CPT | Performed by: INTERNAL MEDICINE

## 2018-10-29 PROCEDURE — 1090F PRES/ABSN URINE INCON ASSESS: CPT | Performed by: INTERNAL MEDICINE

## 2018-10-29 PROCEDURE — 1101F PT FALLS ASSESS-DOCD LE1/YR: CPT | Performed by: INTERNAL MEDICINE

## 2018-10-29 PROCEDURE — 83036 HEMOGLOBIN GLYCOSYLATED A1C: CPT | Performed by: INTERNAL MEDICINE

## 2018-10-29 PROCEDURE — 4040F PNEUMOC VAC/ADMIN/RCVD: CPT | Performed by: INTERNAL MEDICINE

## 2018-10-29 PROCEDURE — 99214 OFFICE O/P EST MOD 30 MIN: CPT | Performed by: INTERNAL MEDICINE

## 2018-10-29 ASSESSMENT — ENCOUNTER SYMPTOMS
EYES NEGATIVE: 1
RESPIRATORY NEGATIVE: 1
ALLERGIC/IMMUNOLOGIC NEGATIVE: 1
GASTROINTESTINAL NEGATIVE: 1
BACK PAIN: 1

## 2018-10-29 ASSESSMENT — PATIENT HEALTH QUESTIONNAIRE - PHQ9
SUM OF ALL RESPONSES TO PHQ QUESTIONS 1-9: 0
1. LITTLE INTEREST OR PLEASURE IN DOING THINGS: 0
SUM OF ALL RESPONSES TO PHQ9 QUESTIONS 1 & 2: 0
SUM OF ALL RESPONSES TO PHQ QUESTIONS 1-9: 0
2. FEELING DOWN, DEPRESSED OR HOPELESS: 0

## 2018-10-29 NOTE — PROGRESS NOTES
Future     Standing Expiration Date:   10/29/2019    Basic Metabolic Panel     Please get this labwork done before your next visit. Standing Status:   Future     Standing Expiration Date:   11/29/2018    POCT glycosylated hemoglobin (Hb A1C)       Return in about 6 months (around 4/29/2019) for routine follow up. Patient voiced understanding and agreed to treatment plan. This note is created with the assistance of a speech-recognition program. While intending to generate a document that actually reflects the content of the visit, the document can still have some mistakes which may not have been identified and corrected by editing.     Dr Libby Ronquillo MD, [address]  Associate , Department of Internal Medicine  [facility]  Attending [address] [facility], [facility]                    10/29/2018, 2:19 PM

## 2018-10-29 NOTE — PATIENT INSTRUCTIONS
Script for lab given to pt, no fasting required. Pt will get labs done 4/24/19. Patient to return to clinic 6 months (4/29/19). AVS reviewed and given to pt. It is very important for your care that you keep your appointment. If for some reason you are unable to keep your appointment it is equally important that you call our office at [phone] to cancel your appointment and reschedule. Failure to do so may result in your termination from our practice.   MB

## 2019-01-19 RX ORDER — CITALOPRAM 40 MG/1
TABLET ORAL
Qty: 30 TABLET | Refills: 4 | Status: SHIPPED | OUTPATIENT
Start: 2019-01-19 | End: 2019-04-29 | Stop reason: SDUPTHER

## 2019-01-28 ENCOUNTER — TELEPHONE (OUTPATIENT)
Dept: INTERNAL MEDICINE | Age: 74
End: 2019-01-28

## 2019-02-07 ENCOUNTER — HOSPITAL ENCOUNTER (OUTPATIENT)
Age: 74
Discharge: HOME | End: 2019-02-07
Payer: MEDICARE

## 2019-02-07 DIAGNOSIS — E11.9 TYPE 2 DIABETES MELLITUS WITHOUT COMPLICATION, UNSPECIFIED WHETHER LONG TERM INSULIN USE (HCC): ICD-10-CM

## 2019-02-07 LAB
ANION GAP SERPL CALCULATED.3IONS-SCNC: 17 MMOL/L (ref 9–17)
BUN BLDV-MCNC: 15 MG/DL (ref 8–23)
BUN/CREAT BLD: ABNORMAL (ref 9–20)
CALCIUM SERPL-MCNC: 9.6 MG/DL (ref 8.6–10.4)
CHLORIDE BLD-SCNC: 101 MMOL/L (ref 98–107)
CO2: 19 MMOL/L (ref 20–31)
CREAT SERPL-MCNC: 1.23 MG/DL (ref 0.5–0.9)
GFR AFRICAN AMERICAN: 52 ML/MIN
GFR NON-AFRICAN AMERICAN: 43 ML/MIN
GFR SERPL CREATININE-BSD FRML MDRD: ABNORMAL ML/MIN/{1.73_M2}
GFR SERPL CREATININE-BSD FRML MDRD: ABNORMAL ML/MIN/{1.73_M2}
GLUCOSE BLD-MCNC: 134 MG/DL (ref 70–99)
POTASSIUM SERPL-SCNC: 4.3 MMOL/L (ref 3.7–5.3)
SODIUM BLD-SCNC: 137 MMOL/L (ref 135–144)

## 2019-02-07 PROCEDURE — 36415 COLL VENOUS BLD VENIPUNCTURE: CPT

## 2019-02-07 PROCEDURE — 80048 BASIC METABOLIC PNL TOTAL CA: CPT

## 2019-02-26 ENCOUNTER — TELEPHONE (OUTPATIENT)
Dept: INTERNAL MEDICINE | Age: 74
End: 2019-02-26

## 2019-03-27 ENCOUNTER — OFFICE VISIT (OUTPATIENT)
Dept: PULMONOLOGY | Age: 74
End: 2019-03-27
Payer: MEDICARE

## 2019-03-27 VITALS
WEIGHT: 209 LBS | OXYGEN SATURATION: 98 % | RESPIRATION RATE: 16 BRPM | HEART RATE: 96 BPM | BODY MASS INDEX: 41.03 KG/M2 | HEIGHT: 60 IN | SYSTOLIC BLOOD PRESSURE: 105 MMHG | DIASTOLIC BLOOD PRESSURE: 67 MMHG

## 2019-03-27 DIAGNOSIS — E66.01 MORBID OBESITY WITH BMI OF 40.0-44.9, ADULT (HCC): ICD-10-CM

## 2019-03-27 DIAGNOSIS — J45.30 MILD PERSISTENT ASTHMA WITHOUT COMPLICATION: Primary | ICD-10-CM

## 2019-03-27 DIAGNOSIS — R06.09 DYSPNEA ON EXERTION: ICD-10-CM

## 2019-03-27 DIAGNOSIS — J30.9 ALLERGIC RHINITIS, UNSPECIFIED SEASONALITY, UNSPECIFIED TRIGGER: ICD-10-CM

## 2019-03-27 PROCEDURE — G8427 DOCREV CUR MEDS BY ELIG CLIN: HCPCS | Performed by: INTERNAL MEDICINE

## 2019-03-27 PROCEDURE — 1090F PRES/ABSN URINE INCON ASSESS: CPT | Performed by: INTERNAL MEDICINE

## 2019-03-27 PROCEDURE — G8417 CALC BMI ABV UP PARAM F/U: HCPCS | Performed by: INTERNAL MEDICINE

## 2019-03-27 PROCEDURE — 1036F TOBACCO NON-USER: CPT | Performed by: INTERNAL MEDICINE

## 2019-03-27 PROCEDURE — 1123F ACP DISCUSS/DSCN MKR DOCD: CPT | Performed by: INTERNAL MEDICINE

## 2019-03-27 PROCEDURE — 4040F PNEUMOC VAC/ADMIN/RCVD: CPT | Performed by: INTERNAL MEDICINE

## 2019-03-27 PROCEDURE — G8399 PT W/DXA RESULTS DOCUMENT: HCPCS | Performed by: INTERNAL MEDICINE

## 2019-03-27 PROCEDURE — 3017F COLORECTAL CA SCREEN DOC REV: CPT | Performed by: INTERNAL MEDICINE

## 2019-03-27 PROCEDURE — 99214 OFFICE O/P EST MOD 30 MIN: CPT | Performed by: INTERNAL MEDICINE

## 2019-03-27 PROCEDURE — G8482 FLU IMMUNIZE ORDER/ADMIN: HCPCS | Performed by: INTERNAL MEDICINE

## 2019-03-27 RX ORDER — FLUTICASONE PROPIONATE 50 MCG
2 SPRAY, SUSPENSION (ML) NASAL DAILY
Qty: 1 BOTTLE | Refills: 11 | Status: SHIPPED | OUTPATIENT
Start: 2019-03-27 | End: 2019-12-03 | Stop reason: SDUPTHER

## 2019-03-27 RX ORDER — ALBUTEROL SULFATE 90 UG/1
2 AEROSOL, METERED RESPIRATORY (INHALATION) EVERY 6 HOURS PRN
Qty: 1 INHALER | Refills: 11 | Status: SHIPPED | OUTPATIENT
Start: 2019-03-27 | End: 2019-12-03 | Stop reason: SDUPTHER

## 2019-03-27 RX ORDER — FLUTICASONE PROPIONATE 110 UG/1
2 AEROSOL, METERED RESPIRATORY (INHALATION) 2 TIMES DAILY
Qty: 1 INHALER | Refills: 11 | Status: SHIPPED | OUTPATIENT
Start: 2019-03-27

## 2019-04-15 RX ORDER — SIMVASTATIN 40 MG
40 TABLET ORAL NIGHTLY
Qty: 90 TABLET | Refills: 3 | Status: SHIPPED | OUTPATIENT
Start: 2019-04-15 | End: 2020-05-07 | Stop reason: SDUPTHER

## 2019-04-18 NOTE — TELEPHONE ENCOUNTER
E-scribe request for Valsartan-HCTZ 160-12.5 MG. Please review and e-scribe if applicable. Next Visit Date:  4/29/2019    Health Maintenance   Topic Date Due    Hepatitis C screen  1945    DTaP/Tdap/Td vaccine (1 - Tdap) 07/03/1964    Shingles Vaccine (1 of 2) 07/03/1995    Diabetic microalbuminuria test  06/10/2018    Diabetic retinal exam  12/12/2018    Diabetic foot exam  03/19/2019    Lipid screen  04/17/2019    A1C test (Diabetic or Prediabetic)  10/29/2019    Potassium monitoring  02/07/2020    Creatinine monitoring  02/07/2020    Breast cancer screen  06/21/2020    Colon cancer screen colonoscopy  07/07/2027    DEXA (modify frequency per FRAX score)  Completed    Flu vaccine  Completed    Pneumococcal 65+ years Vaccine  Completed             (applicable per patient's age: Cancer Screenings, Depression Screening, Fall Risk Screening, Immunizations)    Hemoglobin A1C (%)   Date Value   10/29/2018 7.1   04/30/2018 6.7   10/26/2017 7.4     Microalb/Crt.  Ratio (mcg/mg creat)   Date Value   06/10/2017 20     LDL Cholesterol (mg/dL)   Date Value   04/17/2018 46     AST (U/L)   Date Value   10/23/2018 27     ALT (U/L)   Date Value   10/23/2018 22     BUN (mg/dL)   Date Value   02/07/2019 15      (goal A1C is < 7)   (goal LDL is <100) need 30-50% reduction from baseline     BP Readings from Last 3 Encounters:   03/27/19 105/67   10/29/18 124/69   09/26/18 108/65    (goal /80)      All Future Testing planned in [facility]:  Lab Frequency Next Occurrence   XR CHEST STANDARD (2 VW) Once 07/25/2019   Full PFT Study With Bronchodilator Once 09/27/2019       Next Visit Date:  Future Appointments   Date Time Provider Eulalia Zuleta   4/29[facility]2019 12:40 PM [facility]   9/25/2019  1:00 PM Waqar Shell MD Resp Spec [facility]   9/25/2019  1:30 PM SCHEDULE, [facility] RESPIRATORY SPEC Resp Spec [facility]            Patient Active Problem List:     S/P total knee arthroplasty     Diabetes mellitus ([facility]     Hypertension     Asthma     Arthritis     Hyperkalemia     Acquired trigger finger     Osteoarthritis of knee

## 2019-04-19 RX ORDER — VALSARTAN AND HYDROCHLOROTHIAZIDE 160; 12.5 MG/1; MG/1
TABLET, FILM COATED ORAL
Qty: 90 TABLET | Refills: 2 | Status: SHIPPED | OUTPATIENT
Start: 2019-04-19 | End: 2020-02-26

## 2019-04-29 ENCOUNTER — OFFICE VISIT (OUTPATIENT)
Dept: INTERNAL MEDICINE | Age: 74
End: 2019-04-29
Payer: MEDICARE

## 2019-04-29 VITALS
SYSTOLIC BLOOD PRESSURE: 129 MMHG | DIASTOLIC BLOOD PRESSURE: 62 MMHG | HEIGHT: 60 IN | BODY MASS INDEX: 40.88 KG/M2 | HEART RATE: 92 BPM | WEIGHT: 208.2 LBS

## 2019-04-29 DIAGNOSIS — N18.30 TYPE 2 DIABETES MELLITUS WITH STAGE 3 CHRONIC KIDNEY DISEASE, WITH LONG-TERM CURRENT USE OF INSULIN (HCC): Primary | ICD-10-CM

## 2019-04-29 DIAGNOSIS — E11.9 TYPE 2 DIABETES MELLITUS WITHOUT COMPLICATION, UNSPECIFIED WHETHER LONG TERM INSULIN USE (HCC): ICD-10-CM

## 2019-04-29 DIAGNOSIS — E11.21 TYPE 2 DIABETES MELLITUS WITH DIABETIC NEPHROPATHY, WITHOUT LONG-TERM CURRENT USE OF INSULIN (HCC): ICD-10-CM

## 2019-04-29 DIAGNOSIS — Z12.39 BREAST CANCER SCREENING: ICD-10-CM

## 2019-04-29 DIAGNOSIS — Z11.59 ENCOUNTER FOR HEPATITIS C SCREENING TEST FOR LOW RISK PATIENT: ICD-10-CM

## 2019-04-29 DIAGNOSIS — Z79.4 TYPE 2 DIABETES MELLITUS WITH STAGE 3 CHRONIC KIDNEY DISEASE, WITH LONG-TERM CURRENT USE OF INSULIN (HCC): Primary | ICD-10-CM

## 2019-04-29 DIAGNOSIS — E11.22 TYPE 2 DIABETES MELLITUS WITH STAGE 3 CHRONIC KIDNEY DISEASE, WITH LONG-TERM CURRENT USE OF INSULIN (HCC): Primary | ICD-10-CM

## 2019-04-29 DIAGNOSIS — N18.30 CHRONIC KIDNEY DISEASE, STAGE III (MODERATE) (HCC): ICD-10-CM

## 2019-04-29 DIAGNOSIS — E13.21 NEPHROPATHY DUE TO SECONDARY DIABETES (HCC): ICD-10-CM

## 2019-04-29 LAB — HBA1C MFR BLD: 6.7 %

## 2019-04-29 PROCEDURE — G8399 PT W/DXA RESULTS DOCUMENT: HCPCS | Performed by: INTERNAL MEDICINE

## 2019-04-29 PROCEDURE — 3044F HG A1C LEVEL LT 7.0%: CPT | Performed by: INTERNAL MEDICINE

## 2019-04-29 PROCEDURE — 99213 OFFICE O/P EST LOW 20 MIN: CPT | Performed by: INTERNAL MEDICINE

## 2019-04-29 PROCEDURE — 4040F PNEUMOC VAC/ADMIN/RCVD: CPT | Performed by: INTERNAL MEDICINE

## 2019-04-29 PROCEDURE — 1123F ACP DISCUSS/DSCN MKR DOCD: CPT | Performed by: INTERNAL MEDICINE

## 2019-04-29 PROCEDURE — 1036F TOBACCO NON-USER: CPT | Performed by: INTERNAL MEDICINE

## 2019-04-29 PROCEDURE — 2022F DILAT RTA XM EVC RTNOPTHY: CPT | Performed by: INTERNAL MEDICINE

## 2019-04-29 PROCEDURE — 83036 HEMOGLOBIN GLYCOSYLATED A1C: CPT | Performed by: INTERNAL MEDICINE

## 2019-04-29 PROCEDURE — G8510 SCR DEP NEG, NO PLAN REQD: HCPCS | Performed by: INTERNAL MEDICINE

## 2019-04-29 PROCEDURE — 1090F PRES/ABSN URINE INCON ASSESS: CPT | Performed by: INTERNAL MEDICINE

## 2019-04-29 PROCEDURE — 99211 OFF/OP EST MAY X REQ PHY/QHP: CPT | Performed by: INTERNAL MEDICINE

## 2019-04-29 PROCEDURE — G8417 CALC BMI ABV UP PARAM F/U: HCPCS | Performed by: INTERNAL MEDICINE

## 2019-04-29 PROCEDURE — G8427 DOCREV CUR MEDS BY ELIG CLIN: HCPCS | Performed by: INTERNAL MEDICINE

## 2019-04-29 PROCEDURE — 3017F COLORECTAL CA SCREEN DOC REV: CPT | Performed by: INTERNAL MEDICINE

## 2019-04-29 RX ORDER — CITALOPRAM 40 MG/1
TABLET ORAL
Qty: 90 TABLET | Refills: 3 | Status: SHIPPED | OUTPATIENT
Start: 2019-04-29 | End: 2020-05-27

## 2019-04-29 ASSESSMENT — ENCOUNTER SYMPTOMS
ALLERGIC/IMMUNOLOGIC NEGATIVE: 1
GASTROINTESTINAL NEGATIVE: 1
SHORTNESS OF BREATH: 1
WHEEZING: 1
EYE DISCHARGE: 1
BACK PAIN: 1

## 2019-04-29 ASSESSMENT — PATIENT HEALTH QUESTIONNAIRE - PHQ9
1. LITTLE INTEREST OR PLEASURE IN DOING THINGS: 1
SUM OF ALL RESPONSES TO PHQ QUESTIONS 1-9: 1
2. FEELING DOWN, DEPRESSED OR HOPELESS: 0
SUM OF ALL RESPONSES TO PHQ QUESTIONS 1-9: 1
SUM OF ALL RESPONSES TO PHQ9 QUESTIONS 1 & 2: 1

## 2019-04-29 NOTE — PATIENT INSTRUCTIONS
Medications e-scribe to pharmacy of pt's choice. Script for lab given to pt, no fasting required. Pt will get labs done 11/2/19. Script for Mammogram and instructions for HECTOR given to pt. Scheduling will call with the appointment date and time. Please call [phone] if you haven't heard anything from them. Patient to return to clinic 6 months (11/7/19). AVS reviewed and given to pt. It is very important for your care that you keep your appointment. If for some reason you are unable to keep your appointment it is equally important that you call our office at [phone] to cancel your appointment and reschedule. Failure to do so may result in your termination from our practice.   MB

## 2019-04-29 NOTE — PROGRESS NOTES
[facility]   Progress Note      Chronic Disease Visit Information    BP Readings from Last 3 Encounters:   03/27/19 105/67   10/29/18 124/69   09/26/18 108/65          Hemoglobin A1C (%)   Date Value   10/29/2018 7.1   04/30/2018 6.7   10/26/2017 7.4     Microalb/Crt. Ratio (mcg/mg creat)   Date Value   06/10/2017 20     LDL Cholesterol (mg/dL)   Date Value   04/17/2018 46     HDL (mg/dL)   Date Value   04/17/2018 78     BUN (mg/dL)   Date Value   02/07/2019 15     CREATININE (mg/dL)   Date Value   02/07/2019 1.23 (H)     Glucose (mg/dL)   Date Value   02/07/2019 134 (H)   09/30/2011 85            Have you changed or started any medications since your last visit including any over-the-counter medicines, vitamins, or herbal medicines? no   Are you having any side effects from any of your medications? -  no  Have you stopped taking any of your medications? Is so, why? -  no    Have you seen any other physician or provider since your last visit? Yes - Records Obtained  Have you had any other diagnostic tests since your last visit? Yes - Records Obtained  Have you been seen in the emergency room and/or had an admission to a hospital since we last saw you? No  Have you had your annual diabetic retinal (eye) exam? Yes - Records Obtained  Have you had your routine dental cleaning in the past 6 months? no    Have you activated your [employer] account? If not, what are your barriers?  Yes     Patient Care Team:  Park Stevenson MD as PCP - General (Internal Medicine)  Park Stevenson MD as PCP - [facility] Attributed Provider  Vickie Luna MD as Consulting Physician (Pulmonology)         Medical History Review  Past Medical, Family, and Social History reviewed and does contribute to the patient presenting condition    Health Maintenance   Topic Date Due    Hepatitis C screen  1945    DTaP/Tdap/Td vaccine (1 - Tdap) 07/03/1964    Shingles Vaccine (1 of 2) 07/03/1995    Diabetic microalbuminuria test 06/10/2018    Diabetic retinal exam  12/12/2018    Diabetic foot exam  03/19/2019    Lipid screen  04/17/2019    A1C test (Diabetic or Prediabetic)  10/29/2019    Potassium monitoring  02/07/2020    Creatinine monitoring  02/07/2020    Breast cancer screen  06/21/2020    Colon cancer screen colonoscopy  07/07/2027    DEXA (modify frequency per FRAX score)  Completed    Flu vaccine  Completed    Pneumococcal 65+ years Vaccine  Completed       Date of patient's visit: 4/29/2019  Patient's Name:  Timm Lanes Mix                   YOB: 1945        PCP:  Flor Lemos MD    Timm Lanes Mix is a 68 y.o. female who presents for   Chief Complaint   Patient presents with    6 Month Follow-Up    Diabetes    Hypertension    and follow up of chronic medical problems. Patient Active Problem List   Diagnosis    S/P total knee arthroplasty    Diabetes mellitus ([address]    Hypertension    Asthma    Arthritis    Hyperkalemia    Acquired trigger finger    Osteoarthritis of knee       HISTORY OF PRESENT ILLNESS:    History was obtained from the patient. Treatment Adherence:   Medication compliance:  compliant all of the time  Diet compliance:  compliant all of the time  Weight trend: stable  Current exercise: no regular exercise  Barriers: none    Diabetes Mellitus Type 2: Current symptoms/problems include none. Home blood sugar records: patient does not test  Any episodes of hypoglycemia? no  Eye exam current (within one year): yes  Tobacco history: She  reports that she quit smoking about 21 years ago. Her smoking use included cigarettes. She smoked 1.00 pack per day. She has quit using smokeless tobacco.   Daily Aspirin? Yes    Hypertension:  Home blood pressure monitoring: No.  She is adherent to a low sodium diet. Patient denies chest pain and shortness of breath. Antihypertensive medication side effects: no medication side effects noted. Use of agents associated with hypertension: none.  Coronary Art Dis Other        REVIEW OF SYSTEMS:    Review of Systems   Constitutional: Negative. HENT: Positive for congestion. Eyes: Positive for discharge. Respiratory: Positive for shortness of breath and wheezing. Cardiovascular: Negative. Gastrointestinal: Negative. Endocrine: Negative. Genitourinary: Positive for frequency. Musculoskeletal: Positive for back pain. Skin: Negative. Allergic/Immunologic: Negative. Neurological: Positive for light-headedness and headaches. Hematological: Negative.           PHYSICAL EXAM:      Vitals:    04/29/19 1252   BP: 129/62   Pulse: 92     BP Readings from Last 3 Encounters:   04/29/19 129/62   03/27/19 105/67   10/29/18 124/69       Physical Examination: General appearance - alert, well appearing, and in no distress and overweight  Mental status - alert, oriented to person, place, and time  Eyes - pupils equal and reactive, extraocular eye movements intact  Chest - clear to auscultation, no wheezes, rales or rhonchi, symmetric air entry  Heart - normal rate and regular rhythm  Abdomen - bowel sounds normal  Back exam - full range of motion, no tenderness, palpable spasm or pain on motion  Neurological - alert, oriented, normal speech, no focal findings or movement disorder noted  Musculoskeletal - no joint tenderness, deformity or swelling  Extremities - peripheral pulses normal, no pedal edema, no clubbing or cyanosis    LABORATORY FINDINGS:    CBC:   Lab Results   Component Value Date    WBC 10.6 02/04/2014    HGB 9.9 02/04/2014     02/04/2014     BMP:    Lab Results   Component Value Date     02/07/2019    K 4.3 02/07/2019     02/07/2019    CO2 19 02/07/2019    BUN 15 02/07/2019    CREATININE 1.23 02/07/2019    GLUCOSE 134 02/07/2019    GLUCOSE 85 09/30/2011     Hemoglobin A1C:   Lab Results   Component Value Date    LABA1C 6.7 04/29/2019     Microalbumin Urine:   Lab Results   Component Value Date    MICROALBUR 68 06/10/2017     Lipid profile:   Lab Results   Component Value Date    CHOL 147 04/17/2018    TRIG 114 04/17/2018    HDL 78 04/17/2018     Thyroid functions: No results found for: TSH   Hepatic functions:   Lab Results   Component Value Date    ALT 22 10/23/2018    AST 27 10/23/2018    PROT 7.5 10/23/2018    BILITOT 0.45 10/23/2018    LABALBU 4.2 10/23/2018     Urine Analysis: No results found for: [address]:      Health Maintenance Due   Topic Date Due    Hepatitis C screen  1945    DTaP/Tdap/Td vaccine (1 - Tdap) 07/03/1964    Shingles Vaccine (1 of 2) 07/03/1995    Diabetic microalbuminuria test  06/10/2018    Diabetic retinal exam  12/12/2018    Diabetic foot exam  03/19/2019    Lipid screen  04/17/2019       ASSESSMENT AND PLAN:       Diagnosis Orders   1. Type 2 diabetes mellitus with stage 3 chronic kidney disease, with long-term current use of insulin (HCC)  POCT glycosylated hemoglobin (Hb A1C)    Microalbumin, Ur   2. Encounter for hepatitis C screening test for low risk patient  Hepatitis C Antibody   3. Breast cancer screening  HECTOR DIGITAL SCREEN W CAD BILATERAL   4. Type 2 diabetes mellitus without complication, unspecified whether long term insulin use ([address]     5. Type 2 diabetes mellitus with diabetic nephropathy, without long-term current use of insulin ([address]     6. Chronic kidney disease, stage III (moderate) (HCC)     7. 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)       Quality & Risk Score Accuracy    Visit Dx:  E11.9 - Type 2 diabetes mellitus without complication, unspecified whether long term insulin use (HCC)  Assessment and plan:  Improving based upon symptoms and exam. Continue current treatment plan and follow up at least yearly. 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 Continue current treatment plan and follow up at least yearly.   Visit Dx:  N18.3 - Chronic kidney disease, stage III (moderate) (HCC)  Assessment and plan:  Stable based upon symptoms and exam. Continue current treatment plan and follow up at least yearly. Last edited 04/29/19 15:47 EDT by Ivania Muller MD               FOLLOW UP:   1. Maverick Gill received counseling on the following healthy behaviors: nutrition and exercise    2. Reviewed prior labs and health maintenance. 3.  Discussed use, benefit, and side effects of prescribed medications. Barriers to medication compliance addressed. All patient questions answered. Pt voiced understanding. 4.  Continue current medications, diet and exercise. Orders Placed This Encounter   Medications    citalopram (CELEXA) 40 MG tablet     Sig: TAKE ONE TABLET BY MOUTH DAILY     Dispense:  90 tablet     Refill:  3          Completed Refills               Requested Prescriptions     Signed Prescriptions Disp Refills    citalopram (CELEXA) 40 MG tablet 90 tablet 3     Sig: TAKE ONE TABLET BY MOUTH DAILY       5. Patient given educational materials - see patient instructions    6. Was a self-tracking handout given in paper form or via [employer]? Yes  If yes, see orders or list here. Orders Placed This Encounter   Procedures    HECTOR DIGITAL SCREEN W CAD BILATERAL     Standing Status:   Future     Standing Expiration Date:   4/28/2020     Scheduling Instructions:      Every year for women age 36 and under 76     Order Specific Question:   Reason for exam:     Answer:   Screening    Hepatitis C Antibody     Please get this labwork done before your next visit. Standing Status:   Future     Standing Expiration Date:   4/29/2020    Microalbumin, Ur     Standing Status:   Future     Standing Expiration Date:   4/29/2020    POCT glycosylated hemoglobin (Hb A1C)       No follow-ups on file. Patient voiced understanding and agreed to treatment plan.      This note is created with the assistance of a speech-recognition program. While intending to generate a document that actually reflects the content of the visit, the document can still have some mistakes which may not have been identified and corrected by editing.     Dr Va Neil MD, [facility]  [facility] , Department of Internal Medicine  [address] [place] [address]  Attending [address] [facility], [place] [address]                   4/29/2019, 2:26 PM

## 2019-06-24 ENCOUNTER — HOSPITAL ENCOUNTER (OUTPATIENT)
Dept: MAMMOGRAPHY | Age: 74
Discharge: HOME | End: 2019-06-26
Payer: MEDICARE

## 2019-06-24 DIAGNOSIS — Z12.39 BREAST CANCER SCREENING: ICD-10-CM

## 2019-06-24 PROCEDURE — 77063 BREAST TOMOSYNTHESIS BI: CPT

## 2019-10-14 ENCOUNTER — TELEPHONE (OUTPATIENT)
Dept: INTERNAL MEDICINE | Age: 74
End: 2019-10-14

## 2019-10-15 RX ORDER — GLIMEPIRIDE 4 MG/1
TABLET ORAL
Qty: 90 TABLET | Refills: 2 | Status: SHIPPED | OUTPATIENT
Start: 2019-10-15 | End: 2020-05-27

## 2019-11-05 ENCOUNTER — HOSPITAL ENCOUNTER (OUTPATIENT)
Age: 74
Setting detail: SPECIMEN
Discharge: HOME | End: 2019-11-05
Payer: MEDICARE

## 2019-11-05 DIAGNOSIS — E11.22 TYPE 2 DIABETES MELLITUS WITH STAGE 3 CHRONIC KIDNEY DISEASE, WITH LONG-TERM CURRENT USE OF INSULIN (HCC): ICD-10-CM

## 2019-11-05 DIAGNOSIS — Z79.4 TYPE 2 DIABETES MELLITUS WITH STAGE 3 CHRONIC KIDNEY DISEASE, WITH LONG-TERM CURRENT USE OF INSULIN (HCC): ICD-10-CM

## 2019-11-05 DIAGNOSIS — N18.30 TYPE 2 DIABETES MELLITUS WITH STAGE 3 CHRONIC KIDNEY DISEASE, WITH LONG-TERM CURRENT USE OF INSULIN (HCC): ICD-10-CM

## 2019-11-05 DIAGNOSIS — Z11.59 ENCOUNTER FOR HEPATITIS C SCREENING TEST FOR LOW RISK PATIENT: ICD-10-CM

## 2019-11-05 LAB
CREATININE URINE: 269 MG/DL (ref 28–217)
HEPATITIS C ANTIBODY: NONREACTIVE
MICROALBUMIN/CREAT 24H UR: 35 MG/L
MICROALBUMIN/CREAT UR-RTO: 13 MCG/MG CREAT

## 2019-11-05 PROCEDURE — 36415 COLL VENOUS BLD VENIPUNCTURE: CPT

## 2019-11-05 PROCEDURE — 86803 HEPATITIS C AB TEST: CPT

## 2019-11-05 PROCEDURE — 82570 ASSAY OF URINE CREATININE: CPT

## 2019-11-05 PROCEDURE — 82043 UR ALBUMIN QUANTITATIVE: CPT

## 2019-11-07 ENCOUNTER — OFFICE VISIT (OUTPATIENT)
Dept: INTERNAL MEDICINE | Age: 74
End: 2019-11-07
Payer: MEDICARE

## 2019-11-07 VITALS
BODY MASS INDEX: 41.23 KG/M2 | DIASTOLIC BLOOD PRESSURE: 62 MMHG | HEIGHT: 60 IN | SYSTOLIC BLOOD PRESSURE: 108 MMHG | WEIGHT: 210 LBS | HEART RATE: 93 BPM

## 2019-11-07 DIAGNOSIS — J45.30 MILD PERSISTENT ASTHMA WITHOUT COMPLICATION: ICD-10-CM

## 2019-11-07 DIAGNOSIS — Z23 NEEDS FLU SHOT: ICD-10-CM

## 2019-11-07 DIAGNOSIS — E11.21 TYPE 2 DIABETES MELLITUS WITH DIABETIC NEPHROPATHY, WITHOUT LONG-TERM CURRENT USE OF INSULIN (HCC): Primary | ICD-10-CM

## 2019-11-07 DIAGNOSIS — D64.9 NORMOCYTIC ANEMIA: ICD-10-CM

## 2019-11-07 DIAGNOSIS — I65.22 STENOSIS OF LEFT CAROTID ARTERY: ICD-10-CM

## 2019-11-07 DIAGNOSIS — E66.01 MORBID OBESITY WITH BMI OF 40.0-44.9, ADULT (HCC): ICD-10-CM

## 2019-11-07 DIAGNOSIS — I10 ESSENTIAL HYPERTENSION: ICD-10-CM

## 2019-11-07 PROCEDURE — 1036F TOBACCO NON-USER: CPT | Performed by: INTERNAL MEDICINE

## 2019-11-07 PROCEDURE — 1123F ACP DISCUSS/DSCN MKR DOCD: CPT | Performed by: INTERNAL MEDICINE

## 2019-11-07 PROCEDURE — 90688 IIV4 VACCINE SPLT 0.5 ML IM: CPT | Performed by: INTERNAL MEDICINE

## 2019-11-07 PROCEDURE — G8427 DOCREV CUR MEDS BY ELIG CLIN: HCPCS | Performed by: INTERNAL MEDICINE

## 2019-11-07 PROCEDURE — G8482 FLU IMMUNIZE ORDER/ADMIN: HCPCS | Performed by: INTERNAL MEDICINE

## 2019-11-07 PROCEDURE — G8399 PT W/DXA RESULTS DOCUMENT: HCPCS | Performed by: INTERNAL MEDICINE

## 2019-11-07 PROCEDURE — G8598 ASA/ANTIPLAT THER USED: HCPCS | Performed by: INTERNAL MEDICINE

## 2019-11-07 PROCEDURE — 4040F PNEUMOC VAC/ADMIN/RCVD: CPT | Performed by: INTERNAL MEDICINE

## 2019-11-07 PROCEDURE — G8417 CALC BMI ABV UP PARAM F/U: HCPCS | Performed by: INTERNAL MEDICINE

## 2019-11-07 PROCEDURE — 3044F HG A1C LEVEL LT 7.0%: CPT | Performed by: INTERNAL MEDICINE

## 2019-11-07 PROCEDURE — 2022F DILAT RTA XM EVC RTNOPTHY: CPT | Performed by: INTERNAL MEDICINE

## 2019-11-07 PROCEDURE — 3017F COLORECTAL CA SCREEN DOC REV: CPT | Performed by: INTERNAL MEDICINE

## 2019-11-07 PROCEDURE — 99211 OFF/OP EST MAY X REQ PHY/QHP: CPT | Performed by: INTERNAL MEDICINE

## 2019-11-07 PROCEDURE — 99213 OFFICE O/P EST LOW 20 MIN: CPT | Performed by: INTERNAL MEDICINE

## 2019-11-07 PROCEDURE — 1090F PRES/ABSN URINE INCON ASSESS: CPT | Performed by: INTERNAL MEDICINE

## 2019-11-07 ASSESSMENT — ENCOUNTER SYMPTOMS
SHORTNESS OF BREATH: 0
BLOOD IN STOOL: 0
NAUSEA: 0
PHOTOPHOBIA: 0
SINUS PAIN: 0
COUGH: 0
CONSTIPATION: 0
BACK PAIN: 1
SINUS PRESSURE: 0
ABDOMINAL PAIN: 0
WHEEZING: 0

## 2019-11-27 ENCOUNTER — HOSPITAL ENCOUNTER (OUTPATIENT)
Age: 74
Discharge: HOME | End: 2019-11-29
Payer: MEDICARE

## 2019-11-27 ENCOUNTER — HOSPITAL ENCOUNTER (OUTPATIENT)
Dept: GENERAL RADIOLOGY | Age: 74
Discharge: HOME | End: 2019-11-29
Payer: MEDICARE

## 2019-11-27 DIAGNOSIS — R06.09 DYSPNEA ON EXERTION: ICD-10-CM

## 2019-11-27 PROCEDURE — 71046 X-RAY EXAM CHEST 2 VIEWS: CPT

## 2019-12-03 ENCOUNTER — OFFICE VISIT (OUTPATIENT)
Dept: PULMONOLOGY | Age: 74
End: 2019-12-03
Payer: MEDICARE

## 2019-12-03 VITALS
OXYGEN SATURATION: 97 % | WEIGHT: 201 LBS | BODY MASS INDEX: 39.46 KG/M2 | HEIGHT: 60 IN | SYSTOLIC BLOOD PRESSURE: 110 MMHG | DIASTOLIC BLOOD PRESSURE: 68 MMHG | TEMPERATURE: 97.6 F | HEART RATE: 95 BPM

## 2019-12-03 DIAGNOSIS — G47.33 OBSTRUCTIVE SLEEP APNEA: Primary | ICD-10-CM

## 2019-12-03 DIAGNOSIS — J30.9 ALLERGIC RHINITIS, UNSPECIFIED SEASONALITY, UNSPECIFIED TRIGGER: ICD-10-CM

## 2019-12-03 DIAGNOSIS — J45.30 MILD PERSISTENT ASTHMA WITHOUT COMPLICATION: ICD-10-CM

## 2019-12-03 DIAGNOSIS — R06.09 DYSPNEA ON EXERTION: ICD-10-CM

## 2019-12-03 PROCEDURE — G8427 DOCREV CUR MEDS BY ELIG CLIN: HCPCS | Performed by: INTERNAL MEDICINE

## 2019-12-03 PROCEDURE — 1036F TOBACCO NON-USER: CPT | Performed by: INTERNAL MEDICINE

## 2019-12-03 PROCEDURE — G8598 ASA/ANTIPLAT THER USED: HCPCS | Performed by: INTERNAL MEDICINE

## 2019-12-03 PROCEDURE — 94729 DIFFUSING CAPACITY: CPT | Performed by: INTERNAL MEDICINE

## 2019-12-03 PROCEDURE — 99214 OFFICE O/P EST MOD 30 MIN: CPT | Performed by: INTERNAL MEDICINE

## 2019-12-03 PROCEDURE — 94726 PLETHYSMOGRAPHY LUNG VOLUMES: CPT | Performed by: INTERNAL MEDICINE

## 2019-12-03 PROCEDURE — 1090F PRES/ABSN URINE INCON ASSESS: CPT | Performed by: INTERNAL MEDICINE

## 2019-12-03 PROCEDURE — G8417 CALC BMI ABV UP PARAM F/U: HCPCS | Performed by: INTERNAL MEDICINE

## 2019-12-03 PROCEDURE — 1123F ACP DISCUSS/DSCN MKR DOCD: CPT | Performed by: INTERNAL MEDICINE

## 2019-12-03 PROCEDURE — 4040F PNEUMOC VAC/ADMIN/RCVD: CPT | Performed by: INTERNAL MEDICINE

## 2019-12-03 PROCEDURE — 94010 BREATHING CAPACITY TEST: CPT | Performed by: INTERNAL MEDICINE

## 2019-12-03 PROCEDURE — G8399 PT W/DXA RESULTS DOCUMENT: HCPCS | Performed by: INTERNAL MEDICINE

## 2019-12-03 PROCEDURE — G8482 FLU IMMUNIZE ORDER/ADMIN: HCPCS | Performed by: INTERNAL MEDICINE

## 2019-12-03 PROCEDURE — 3017F COLORECTAL CA SCREEN DOC REV: CPT | Performed by: INTERNAL MEDICINE

## 2019-12-03 RX ORDER — ALBUTEROL SULFATE 90 UG/1
2 AEROSOL, METERED RESPIRATORY (INHALATION) EVERY 6 HOURS PRN
Qty: 1 INHALER | Refills: 11 | Status: SHIPPED | OUTPATIENT
Start: 2019-12-03

## 2019-12-03 RX ORDER — FLUTICASONE PROPIONATE 50 MCG
2 SPRAY, SUSPENSION (ML) NASAL DAILY
Qty: 1 BOTTLE | Refills: 11 | Status: SHIPPED | OUTPATIENT
Start: 2019-12-03

## 2019-12-03 ASSESSMENT — SLEEP AND FATIGUE QUESTIONNAIRES
HOW LIKELY ARE YOU TO NOD OFF OR FALL ASLEEP WHILE WATCHING TV: 0
ESS TOTAL SCORE: 4
HOW LIKELY ARE YOU TO NOD OFF OR FALL ASLEEP WHILE SITTING AND TALKING TO SOMEONE: 0
HOW LIKELY ARE YOU TO NOD OFF OR FALL ASLEEP WHILE LYING DOWN TO REST IN THE AFTERNOON WHEN CIRCUMSTANCES PERMIT: 3
HOW LIKELY ARE YOU TO NOD OFF OR FALL ASLEEP IN A CAR, WHILE STOPPED FOR A FEW MINUTES IN TRAFFIC: 0
HOW LIKELY ARE YOU TO NOD OFF OR FALL ASLEEP WHILE SITTING INACTIVE IN A PUBLIC PLACE: 1
HOW LIKELY ARE YOU TO NOD OFF OR FALL ASLEEP WHILE SITTING QUIETLY AFTER LUNCH WITHOUT ALCOHOL: 0
HOW LIKELY ARE YOU TO NOD OFF OR FALL ASLEEP WHEN YOU ARE A PASSENGER IN A CAR FOR AN HOUR WITHOUT A BREAK: 0
HOW LIKELY ARE YOU TO NOD OFF OR FALL ASLEEP WHILE SITTING AND READING: 0

## 2020-02-17 ENCOUNTER — TELEPHONE (OUTPATIENT)
Dept: INTERNAL MEDICINE | Age: 75
End: 2020-02-17

## 2020-02-17 NOTE — LETTER
[address]  [address] [address]  Phone: [phone]  Fax: [phone]    Shu Jack MD        February 17, 2020    [id number] [id number]      Dear Gerald Hogan: We are sending this letter because your PCP ordered [facility] for you to have done at your last visit here and they have not yet been completed. If you can please come to our office on the 2nd floor to  your orders to have them compelted. If you do not have a follow-up appointment scheduled you can either contact the office to make an appointment with us or you can make one when you come in to pick-up your orders. If you have any questions or concerns, please don't hesitate to call.     Sincerely,        Shu Jack MD

## 2020-02-25 NOTE — TELEPHONE ENCOUNTER
Request for valsartan HCTZ - medication pended. Please fill if appropriate. Patient has upcoming appt. Next Visit Date:  Future Appointments   Date Time Provider Eulalia Zuleta   5/7/2020 10:20 AM Luz Viera MD [facility] IM [id number]   6/2/2020 11:00 AM Allan Oquendo  [address] Maintenance   Topic Date Due    DTaP/Tdap/Td vaccine (1 - Tdap) 07/03/1956    Hepatitis B vaccine (1 of 3 - Risk 3-dose series) 07/03/1964    Shingles Vaccine (1 of 2) 07/03/1995    Diabetic retinal exam  12/12/2018    Diabetic foot exam  03/19/2019    Lipid screen  04/17/2019    Annual Wellness Visit (AWV)  05/29/2019    Potassium monitoring  02/07/2020    Creatinine monitoring  02/07/2020    A1C test (Diabetic or Prediabetic)  04/29/2020    Diabetic microalbuminuria test  11/05/2020    Breast cancer screen  06/24/2021    Colon cancer screen colonoscopy  07/07/2027    DEXA (modify frequency per FRAX score)  Completed    Flu vaccine  Completed    Pneumococcal 65+ years Vaccine  Completed    Hepatitis C screen  Completed    Hepatitis A vaccine  Aged Out    Hib vaccine  Aged Out    Meningococcal (ACWY) vaccine  Aged Out       Hemoglobin A1C (%)   Date Value   04/29/2019 6.7   10/29/2018 7.1   04/30/2018 6.7             ( goal A1C is < 7)   Microalb/Crt.  Ratio (mcg/mg creat)   Date Value   11/05/2019 13     LDL Cholesterol (mg/dL)   Date Value   04/17/2018 46       (goal LDL is <100)   AST (U/L)   Date Value   10/23/2018 27     ALT (U/L)   Date Value   10/23/2018 22     BUN (mg/dL)   Date Value   02/07/2019 15     BP Readings from Last 3 Encounters:   12/03/19 110/68   11/07/19 108/62   04/29/19 129/62          (goal 120/80)    All Future Testing planned in [facility]  Lab Frequency Next Occurrence   Lipid Panel Once 02/15/2020   CBC With Auto Differential Once 24/13/6345   Basic Metabolic Panel Once 32/77/5122   TSH with Reflex Once 04/17/2020   Hemoglobin A1C Once 04/17/2020         Patient Active Problem List:     S/P total knee arthroplasty     Diabetes mellitus ([facility]     Hypertension     Asthma     Arthritis     Acquired trigger finger     Osteoarthritis of knee

## 2020-02-26 RX ORDER — VALSARTAN AND HYDROCHLOROTHIAZIDE 160; 12.5 MG/1; MG/1
TABLET, FILM COATED ORAL
Qty: 90 TABLET | Refills: 1 | Status: SHIPPED | OUTPATIENT
Start: 2020-02-26 | End: 2020-09-29

## 2020-05-04 ENCOUNTER — HOSPITAL ENCOUNTER (OUTPATIENT)
Age: 75
Setting detail: SPECIMEN
Discharge: HOME | End: 2020-05-04
Payer: MEDICARE

## 2020-05-04 LAB
ABSOLUTE EOS #: 0.38 K/UL (ref 0–0.44)
ABSOLUTE IMMATURE GRANULOCYTE: 0.04 K/UL (ref 0–0.3)
ABSOLUTE LYMPH #: 2.77 K/UL (ref 1.1–3.7)
ABSOLUTE MONO #: 0.79 K/UL (ref 0.1–1.2)
ANION GAP SERPL CALCULATED.3IONS-SCNC: 21 MMOL/L (ref 9–17)
BASOPHILS # BLD: 1 % (ref 0–2)
BASOPHILS ABSOLUTE: 0.12 K/UL (ref 0–0.2)
BUN BLDV-MCNC: 20 MG/DL (ref 8–23)
BUN/CREAT BLD: ABNORMAL (ref 9–20)
CALCIUM SERPL-MCNC: 9.7 MG/DL (ref 8.6–10.4)
CHLORIDE BLD-SCNC: 98 MMOL/L (ref 98–107)
CHOLESTEROL/HDL RATIO: 2
CHOLESTEROL: 140 MG/DL
CO2: 19 MMOL/L (ref 20–31)
CREAT SERPL-MCNC: 1.24 MG/DL (ref 0.5–0.9)
DIFFERENTIAL TYPE: ABNORMAL
EOSINOPHILS RELATIVE PERCENT: 4 % (ref 1–4)
ESTIMATED AVERAGE GLUCOSE: 154 MG/DL
GFR AFRICAN AMERICAN: 51 ML/MIN
GFR NON-AFRICAN AMERICAN: 42 ML/MIN
GFR SERPL CREATININE-BSD FRML MDRD: ABNORMAL ML/MIN/{1.73_M2}
GFR SERPL CREATININE-BSD FRML MDRD: ABNORMAL ML/MIN/{1.73_M2}
GLUCOSE BLD-MCNC: 169 MG/DL (ref 70–99)
HBA1C MFR BLD: 7 % (ref 4–6)
HCT VFR BLD CALC: 37.7 % (ref 36.3–47.1)
HDLC SERPL-MCNC: 70 MG/DL
HEMOGLOBIN: 11.5 G/DL (ref 11.9–15.1)
IMMATURE GRANULOCYTES: 0 %
LDL CHOLESTEROL: 50 MG/DL (ref 0–130)
LYMPHOCYTES # BLD: 26 % (ref 24–43)
MCH RBC QN AUTO: 28.5 PG (ref 25.2–33.5)
MCHC RBC AUTO-ENTMCNC: 30.5 G/DL (ref 28.4–34.8)
MCV RBC AUTO: 93.3 FL (ref 82.6–102.9)
MONOCYTES # BLD: 8 % (ref 3–12)
NRBC AUTOMATED: 0 PER 100 WBC
PDW BLD-RTO: 13.4 % (ref 11.8–14.4)
PLATELET # BLD: 260 K/UL (ref 138–453)
PLATELET ESTIMATE: ABNORMAL
PMV BLD AUTO: 11.3 FL (ref 8.1–13.5)
POTASSIUM SERPL-SCNC: 4.4 MMOL/L (ref 3.7–5.3)
RBC # BLD: 4.04 M/UL (ref 3.95–5.11)
RBC # BLD: ABNORMAL 10*6/UL
SEG NEUTROPHILS: 61 % (ref 36–65)
SEGMENTED NEUTROPHILS ABSOLUTE COUNT: 6.4 K/UL (ref 1.5–8.1)
SODIUM BLD-SCNC: 138 MMOL/L (ref 135–144)
TRIGL SERPL-MCNC: 100 MG/DL
TSH SERPL DL<=0.05 MIU/L-ACNC: 2.37 MIU/L (ref 0.3–5)
VLDLC SERPL CALC-MCNC: NORMAL MG/DL (ref 1–30)
WBC # BLD: 10.5 K/UL (ref 3.5–11.3)
WBC # BLD: ABNORMAL 10*3/UL

## 2020-05-07 ENCOUNTER — VIRTUAL VISIT (OUTPATIENT)
Dept: INTERNAL MEDICINE | Age: 75
End: 2020-05-07
Payer: MEDICARE

## 2020-05-07 VITALS — HEIGHT: 60 IN | BODY MASS INDEX: 40.05 KG/M2 | WEIGHT: 204 LBS

## 2020-05-07 PROBLEM — E66.01 MORBID OBESITY WITH BMI OF 40.0-44.9, ADULT (HCC): Status: ACTIVE | Noted: 2020-05-07

## 2020-05-07 PROCEDURE — 99442 PR PHYS/QHP TELEPHONE EVALUATION 11-20 MIN: CPT | Performed by: INTERNAL MEDICINE

## 2020-05-07 RX ORDER — SIMVASTATIN 40 MG
40 TABLET ORAL NIGHTLY
Qty: 90 TABLET | Refills: 3 | Status: SHIPPED | OUTPATIENT
Start: 2020-05-07 | End: 2021-07-13

## 2020-05-07 NOTE — PATIENT INSTRUCTIONS
Medications e-scribe to pharmacy of pt's choice. Script for Mammogram and instructions for HECTOR given to pt. Scheduling will call with the appointment date and time. Please call [phone] if you haven't heard anything from them. Patient to return to clinic 1 year (office will call and schedule appt). Patient to return to clinic for a Medicare Wellness Visit (6/18/20). AVS reviewed and given to pt. It is very important for your care that you keep your appointment. If for some reason you are unable to keep your appointment it is equally important that you call our office at [phone] to cancel your appointment and reschedule. Failure to do so may result in your termination from our practice.   MB

## 2020-05-26 NOTE — TELEPHONE ENCOUNTER
Refill request for Celexa and Glimepiride. If appropriate please send medication(s) to patients pharmacy. Next appt: Patient is currently on wait list for provider. Last appt: 5/7/2020    Health Maintenance   Topic Date Due    DTaP/Tdap/Td vaccine (1 - Tdap) 07/03/1964    Shingles Vaccine (1 of 2) 07/03/1995    Diabetic retinal exam  12/12/2018    Diabetic foot exam  03/19/2019    Annual Wellness Visit (AWV)  05/29/2019    A1C test (Diabetic or Prediabetic)  05/04/2021    Lipid screen  05/04/2021    Potassium monitoring  05/04/2021    Creatinine monitoring  05/04/2021    Breast cancer screen  06/24/2021    Colon cancer screen colonoscopy  07/07/2027    DEXA (modify frequency per FRAX score)  Completed    Flu vaccine  Completed    Pneumococcal 65+ years Vaccine  Completed    Hepatitis C screen  Completed    Hepatitis A vaccine  Aged Out    Hib vaccine  Aged Out    Meningococcal (ACWY) vaccine  Aged Out       Hemoglobin A1C (%)   Date Value   05/04/2020 7.0 (H)   04/29/2019 6.7   10/29/2018 7.1             ( goal A1C is < 7)   Microalb/Crt.  Ratio (mcg/mg creat)   Date Value   11/05/2019 13     LDL Cholesterol (mg/dL)   Date Value   05/04/2020 50       (goal LDL is <100)   AST (U/L)   Date Value   10/23/2018 27     ALT (U/L)   Date Value   10/23/2018 22     BUN (mg/dL)   Date Value   05/04/2020 20     BP Readings from Last 3 Encounters:   12/03/19 110/68   11/07/19 108/62   04/29/19 129/62          (goal 120/80)          Patient Active Problem List:     S/P total knee arthroplasty     Type 2 diabetes mellitus with diabetic nephropathy, without long-term current use of insulin (HCC)     Hypertension     Asthma     Arthritis     Acquired trigger finger     Osteoarthritis of knee     Morbid obesity with BMI of 40.0-44.9, adult ([facility] [address] [facility]

## 2020-05-27 RX ORDER — GLIMEPIRIDE 4 MG/1
TABLET ORAL
Qty: 90 TABLET | Refills: 1 | Status: SHIPPED | OUTPATIENT
Start: 2020-05-27 | End: 2021-06-09

## 2020-05-27 RX ORDER — CITALOPRAM 40 MG/1
TABLET ORAL
Qty: 90 TABLET | Refills: 2 | Status: SHIPPED | OUTPATIENT
Start: 2020-05-27 | End: 2020-08-28

## 2020-06-05 ENCOUNTER — OFFICE VISIT (OUTPATIENT)
Dept: PULMONOLOGY | Age: 75
End: 2020-06-05
Payer: MEDICARE

## 2020-06-05 VITALS
OXYGEN SATURATION: 98 % | SYSTOLIC BLOOD PRESSURE: 133 MMHG | WEIGHT: 208 LBS | RESPIRATION RATE: 16 BRPM | TEMPERATURE: 97.6 F | HEIGHT: 60 IN | BODY MASS INDEX: 40.84 KG/M2 | HEART RATE: 108 BPM | DIASTOLIC BLOOD PRESSURE: 70 MMHG

## 2020-06-05 PROCEDURE — 99214 OFFICE O/P EST MOD 30 MIN: CPT | Performed by: INTERNAL MEDICINE

## 2020-06-05 PROCEDURE — 4040F PNEUMOC VAC/ADMIN/RCVD: CPT | Performed by: INTERNAL MEDICINE

## 2020-06-05 PROCEDURE — G8399 PT W/DXA RESULTS DOCUMENT: HCPCS | Performed by: INTERNAL MEDICINE

## 2020-06-05 PROCEDURE — G8417 CALC BMI ABV UP PARAM F/U: HCPCS | Performed by: INTERNAL MEDICINE

## 2020-06-05 PROCEDURE — 1123F ACP DISCUSS/DSCN MKR DOCD: CPT | Performed by: INTERNAL MEDICINE

## 2020-06-05 PROCEDURE — 1036F TOBACCO NON-USER: CPT | Performed by: INTERNAL MEDICINE

## 2020-06-05 PROCEDURE — 1090F PRES/ABSN URINE INCON ASSESS: CPT | Performed by: INTERNAL MEDICINE

## 2020-06-05 PROCEDURE — 3017F COLORECTAL CA SCREEN DOC REV: CPT | Performed by: INTERNAL MEDICINE

## 2020-06-05 PROCEDURE — G8427 DOCREV CUR MEDS BY ELIG CLIN: HCPCS | Performed by: INTERNAL MEDICINE

## 2020-06-05 ASSESSMENT — SLEEP AND FATIGUE QUESTIONNAIRES
HOW LIKELY ARE YOU TO NOD OFF OR FALL ASLEEP IN A CAR, WHILE STOPPED FOR A FEW MINUTES IN TRAFFIC: 0
HOW LIKELY ARE YOU TO NOD OFF OR FALL ASLEEP WHILE SITTING QUIETLY AFTER LUNCH WITHOUT ALCOHOL: 0
HOW LIKELY ARE YOU TO NOD OFF OR FALL ASLEEP WHILE LYING DOWN TO REST IN THE AFTERNOON WHEN CIRCUMSTANCES PERMIT: 3
HOW LIKELY ARE YOU TO NOD OFF OR FALL ASLEEP WHILE WATCHING TV: 0
HOW LIKELY ARE YOU TO NOD OFF OR FALL ASLEEP WHEN YOU ARE A PASSENGER IN A CAR FOR AN HOUR WITHOUT A BREAK: 0
ESS TOTAL SCORE: 3
HOW LIKELY ARE YOU TO NOD OFF OR FALL ASLEEP WHILE SITTING AND READING: 0
HOW LIKELY ARE YOU TO NOD OFF OR FALL ASLEEP WHILE SITTING INACTIVE IN A PUBLIC PLACE: 0
HOW LIKELY ARE YOU TO NOD OFF OR FALL ASLEEP WHILE SITTING AND TALKING TO SOMEONE: 0

## 2020-06-05 NOTE — PROGRESS NOTES
(CELEXA) 40 MG tablet TAKE ONE TABLET BY MOUTH DAILY 90 tablet 2    Latanoprost 0.005 % EMUL latanoprost 0.005 % eye drops      simvastatin (ZOCOR) 40 MG tablet Take 1 tablet by mouth nightly 90 tablet 3    valsartan-hydrochlorothiazide (DIOVAN-HCT) 160-12.5 MG per tablet TAKE ONE TABLET BY MOUTH DAILY 90 tablet 1    fluticasone (FLONASE) 50 MCG/ACT nasal spray 2 sprays by Nasal route daily 1 Bottle 11    albuterol sulfate  (90 Base) MCG/ACT inhaler Inhale 2 puffs into the lungs every 6 hours as needed for Wheezing 1 Inhaler 11    metFORMIN (GLUCOPHAGE) 1000 MG tablet TAKE ONE TABLET BY MOUTH TWICE A DAY WITH MEALS 180 tablet 5    fluticasone (FLOVENT HFA) 110 MCG/ACT inhaler Inhale 2 puffs into the lungs 2 times daily 1 Inhaler 11    acetaminophen (TYLENOL) 500 MG tablet Take 500 mg by mouth      aspirin 81 MG tablet Take 81 mg by mouth daily       No facility-administered encounter medications on file as of 6/5/2020. Objective:    Physical Exam:  Vitals:   /70 (Site: Right Lower Arm, Position: Sitting, Cuff Size: Medium Adult)   Pulse 108   Temp 97.6 °F (36.4 °C)   Resp 16   Ht 5' (1.524 m)   Wt 208 lb (94.3 kg)   SpO2 98% Comment: Room air rest  BMI 40.62 kg/m²   Last 3 weights: Wt Readings from Last 3 Encounters:   06/05/20 208 lb (94.3 kg)   05/07/20 204 lb (92.5 kg)   12/03/19 201 lb (91.2 kg)     Body mass index is 40.62 kg/m². Physical Examination:   General appearance - alert, well appearing, and in no distress  Mental status - alert, oriented to person, place, and time  Eyes - pupils equal and reactive, extraocular eye movements intact  Nose - normal and patent, no erythema, discharge or polyps, mild pallor, and no stigmata of bleeding  Mouth - mucous membranes moist, pharynx normal without lesions, MP III ,small oropharynx  Neck - supple, no significant adenopathy, short and thick.   Chest -no retractions or cyanosis or accessory muscles use, bilateral symmetrical chest movement normal resonance on percussion, air entry is present bilaterally slightly distant no expiratory wheezing no rhonchi no crackles. Heart - normal rate, regular rhythm, normal S1, S2, no murmurs, rubs, clicks or gallops  Abdomen - soft, nontender, nondistended, no masses or organomegaly  Neurological - alert, oriented, normal speech, no focal findings or movement disorder noted  Extremities - peripheral pulses normal, no pedal edema, no clubbing or cyanosis  Skin - normal coloration and turgor, no rashes, no suspicious skin lesions noted     Labs:  None    CBC:   WBC   Date Value Ref Range Status   05/04/2020 10.5 3.5 - 11.3 k/uL Final     Hemoglobin   Date Value Ref Range Status   05/04/2020 11.5 (L) 11.9 - 15.1 g/dL Final     Platelets   Date Value Ref Range Status   05/04/2020 260 138 - 453 k/uL Final     BMP:   Sodium   Date Value Ref Range Status   05/04/2020 138 135 - 144 mmol/L Final     Potassium   Date Value Ref Range Status   05/04/2020 4.4 3.7 - 5.3 mmol/L Final     Chloride   Date Value Ref Range Status   05/04/2020 98 98 - 107 mmol/L Final     CO2   Date Value Ref Range Status   05/04/2020 19 (L) 20 - 31 mmol/L Final     BUN   Date Value Ref Range Status   05/04/2020 20 8 - 23 mg/dL Final     CREATININE   Date Value Ref Range Status   05/04/2020 1.24 (H) 0.50 - 0.90 mg/dL Final   02/07/2019 1.23 (H) 0.50 - 0.90 mg/dL Final   10/23/2018 1.23 (H) 0.50 - 0.90 mg/dL Final     Glucose   Date Value Ref Range Status   05/04/2020 169 (H) 70 - 99 mg/dL Final   09/30/2011 85 74 - 106 mg/dL Final     S. Calcium:   Calcium   Date Value Ref Range Status   05/04/2020 9.7 8.6 - 10.4 mg/dL Final     S.  Ionized Calcium: No results found for: IONCA  S. Magnesium: No results found for: MG  S. Phosphorus:   Phosphorus   Date Value Ref Range Status   01/28/2014 2.7 2.5 - 4.5 mg/dL Final     S. Glucose:   POC Glucose   Date Value Ref Range Status   01/27/2014 153 (H) 65 - 105 mg/dL Final   01/27/2014 112 (H) 65 - 105 mg/dL Final   01/27/2014 116 (H) 65 - 105 mg/dL Final     Glycosylated hemoglobin A1C:   Hemoglobin A1C   Date Value Ref Range Status   05/04/2020 7.0 (H) 4.0 - 6.0 % Final       Pulmonary Functions Testing Results:    12/3/2019: FEV1 1.50 82%, FVC 2.01 87%, FEV1 FVC 94%, TLC 4.06 103%, DLCO 11.93 74%,  3/19/2014: FEV1 1.63  96%, FVC 2.16  89%, [id number] 107 %, TLC 4.34  108% , DLCO 16.49 98%    Blood Gases: No results found for: PH, PCO2, PO2, HCO3, O2SAT    Radiological reports:    CXR  11/27/19  normal chest X-ray in 2014    CT Scans        ECHO:     Immunization History   Administered Date(s) Administered    Influenza, Quadv, IM, (6 mo and older Fluzone, Flulaval, Fluarix and 3 yrs and older Afluria) 09/20/2017, 09/26/2018, 11/07/2019    Influenza, Triv, 3 Years and older, IM (Afluria (5 yrs and older) 09/14/2016    Pneumococcal Conjugate 13-valent ([id number]) 10/26/2016    Pneumococcal Polysaccharide ([id number]) 10/08/2008, 03/27/2017       Assessment:      1. Obstructive sleep apnea   2. Mild persistent asthma without complication   3. Dyspnea on exertion   4. Allergic rhinitis, unspecified seasonality, unspecified trigger     She has history of chronic dyspnea on exertion her pulmonary function test today shows normal FEV1 and FVC, normal lung volume may have mild increase in residual volume and shows mild reduction diffusion capacity, she did have history of a smoking for more than 30 years and likely early emphysema or pulmonary hypertension could be the cause of mild reduction diffusion capacity which was normal in 2014, she had a chest x-ray done and chest x-ray did not show any acute or chronic changes, she had stop smoking more than 22 years ago. As pulmonary function test shows only mild reduction diffusion capacity with normal spirometry and lung volumes her dyspnea on exertion is more likely related to deconditioning and encourage exercise increase activity.     Plan:  Encourage increase activity and exercise and weight loss   Continue Flovent at current [id number] 2 puff twice a day  Continue Flonase nasal spray  Flu vaccine annually. She had Prevnar 13 and up to date with Pneumovax from pulm perspective   Maintain an active lifestyle    Wt loss is recommended and discussed  Follow good sleep hygeine instructions  Not compliant with cpap and does not have cpap any more and and not interested in using CPAP  Handicapped Placard  RTC in 6 months      Francisco Lopez MD             6/5/2020, 2:25 PM     Please note that this chart was generated using voice recognition Dragon dictation software. Although every effort was made to ensure the accuracy of this automated transcription, some errors in transcription may have occurred.

## 2020-07-14 ENCOUNTER — HOSPITAL ENCOUNTER (OUTPATIENT)
Age: 75
Setting detail: SPECIMEN
Discharge: HOME | End: 2020-07-14
Payer: MEDICARE

## 2020-07-18 ENCOUNTER — HOSPITAL ENCOUNTER (OUTPATIENT)
Dept: MAMMOGRAPHY | Age: 75
Discharge: HOME | End: 2020-07-20
Payer: MEDICARE

## 2020-07-18 PROCEDURE — 77063 BREAST TOMOSYNTHESIS BI: CPT

## 2020-07-22 LAB — SURGICAL PATHOLOGY REPORT: NORMAL

## 2020-07-24 ENCOUNTER — TELEPHONE (OUTPATIENT)
Dept: INTERNAL MEDICINE | Facility: CLINIC | Age: 75
End: 2020-07-24

## 2020-08-28 ENCOUNTER — VIRTUAL VISIT (OUTPATIENT)
Dept: INTERNAL MEDICINE | Age: 75
End: 2020-08-28
Payer: MEDICARE

## 2020-08-28 PROCEDURE — 99441 PR PHYS/QHP TELEPHONE EVALUATION 5-10 MIN: CPT | Performed by: INTERNAL MEDICINE

## 2020-08-28 RX ORDER — TRAMADOL HYDROCHLORIDE 50 MG/1
50 TABLET ORAL DAILY PRN
Qty: 20 TABLET | Refills: 0 | Status: SHIPPED | OUTPATIENT
Start: 2020-08-28 | End: 2020-09-28

## 2020-08-28 RX ORDER — DULOXETIN HYDROCHLORIDE 30 MG/1
30 CAPSULE, DELAYED RELEASE ORAL DAILY
Qty: 30 CAPSULE | Refills: 3 | Status: SHIPPED | OUTPATIENT
Start: 2020-08-28 | End: 2020-10-01 | Stop reason: SDUPTHER

## 2020-08-28 ASSESSMENT — PATIENT HEALTH QUESTIONNAIRE - PHQ9
SUM OF ALL RESPONSES TO PHQ QUESTIONS 1-9: 2
2. FEELING DOWN, DEPRESSED OR HOPELESS: 1
SUM OF ALL RESPONSES TO PHQ9 QUESTIONS 1 & 2: 2
1. LITTLE INTEREST OR PLEASURE IN DOING THINGS: 1
SUM OF ALL RESPONSES TO PHQ QUESTIONS 1-9: 2

## 2020-08-28 NOTE — PATIENT INSTRUCTIONS
Medications e-scribe to pharmacy of pt's choice. Tramadol script called into patient's pharmacy. Script for lab mailed to pt, no fasting required. Pt will get labs done before next appt. Patient to return to clinic: Office will call and schedule the appt. You are currently on the wait list.  AVS reviewed and mailed to pt. It is very important for your care that you keep your appointment. If for some reason you are unable to keep your appointment it is equally important that you call our office at [phone] to cancel your appointment and reschedule. Failure to do so may result in your termination from our practice.   MB

## 2020-08-28 NOTE — PROGRESS NOTES
Cassi Collado is a 76 y.o. female evaluated via telephone on 8/28/2020. Consent:  She and/or health care decision maker is aware that that she may receive a bill for this telephone service, depending on her insurance coverage, and has provided verbal consent to proceed: Yes      Documentation:  Subjective:       Cassi Collado is a 76 y.o. female who presents for follow up of anxiety disorder. Current symptoms: difficulty concentrating, shortness of breath. She denies current suicidal and homicidal ideation. She complains of the following side effects from the treatment: none. Has been on celexa for years. She is reporting it not being as effective since all this started with COVID. She reports she is feeling more anxious and more depressed. Chronic Back Pain: Pain is unchanged. On average, pain is perceived as mild (1-3  pain scale). Change in quality of symptoms: no.  Associated symptoms: none. She denies weakness and change in gait. Current treatment: acetaminophen- .. Medication side effects: none. Recent diagnostic testing: none. She reports she is having a hard time walking long distances and is having to stop to rest more often due to pain in knees and back. On the basis of positive falls risk screening, assessment and plan is as follows: home safety tips provided. Patient's medications, allergies, past medical, surgical, social and family histories were reviewed and updated as appropriate. Diagnosis Orders   1. At high risk for falls     2. Osteoarthritis of both knees, unspecified osteoarthritis type  traMADol (ULTRAM) 50 MG tablet   3. Anxiety  DULoxetine (CYMBALTA) 30 MG extended release capsule   4. Current mild episode of major depressive disorder without prior episode (HCC)  DULoxetine (CYMBALTA) 30 MG extended release capsule   5. ROSELIA (acute kidney injury) [facility])  Basic Metabolic Panel     Patient to stop celexa and start cymbalta.    She is to use tylenol and ultram as needed prn  Repeat BMP due to Cr being elevated. Vv in 2 weeks    I affirm this is a Patient Initiated Episode with a Patient who has not had a related appointment within my department in the past 7 days or scheduled within the next 24 hours.     Patient identification was verified at the start of the visit: Yes    Total Time: minutes: 5-10 minutes    Note: not billable if this call serves to triage the patient into an appointment for the relevant concern      Mere Clinton

## 2020-09-10 ENCOUNTER — HOSPITAL ENCOUNTER (OUTPATIENT)
Age: 75
Setting detail: SPECIMEN
Discharge: HOME | End: 2020-09-10
Payer: MEDICARE

## 2020-09-10 LAB
ANION GAP SERPL CALCULATED.3IONS-SCNC: 18 MMOL/L (ref 9–17)
BUN BLDV-MCNC: 25 MG/DL (ref 8–23)
BUN/CREAT BLD: ABNORMAL (ref 9–20)
CALCIUM SERPL-MCNC: 10.4 MG/DL (ref 8.6–10.4)
CHLORIDE BLD-SCNC: 100 MMOL/L (ref 98–107)
CO2: 20 MMOL/L (ref 20–31)
CREAT SERPL-MCNC: 1.21 MG/DL (ref 0.5–0.9)
GFR AFRICAN AMERICAN: 53 ML/MIN
GFR NON-AFRICAN AMERICAN: 43 ML/MIN
GFR SERPL CREATININE-BSD FRML MDRD: ABNORMAL ML/MIN/{1.73_M2}
GFR SERPL CREATININE-BSD FRML MDRD: ABNORMAL ML/MIN/{1.73_M2}
GLUCOSE BLD-MCNC: 200 MG/DL (ref 70–99)
POTASSIUM SERPL-SCNC: 4.9 MMOL/L (ref 3.7–5.3)
SODIUM BLD-SCNC: 138 MMOL/L (ref 135–144)

## 2020-09-17 NOTE — TELEPHONE ENCOUNTER
Refill request for Metformin. If appropriate please send medication(s) to patients pharmacy. Next appt: Patient is currently on wait list for provider. Last appt: 8/28/2020    Health Maintenance   Topic Date Due    DTaP/Tdap/Td vaccine (1 - Tdap) 07/03/1964    Shingles Vaccine (1 of 2) 07/03/1995    Diabetic retinal exam  12/12/2018    Diabetic foot exam  03/19/2019    Annual Wellness Visit (AWV)  05/29/2019    Flu vaccine (1) 09/01/2020    A1C test (Diabetic or Prediabetic)  05/04/2021    Lipid screen  05/04/2021    Potassium monitoring  09/10/2021    Creatinine monitoring  09/10/2021    Colon cancer screen colonoscopy  07/14/2030    DEXA (modify frequency per FRAX score)  Completed    Pneumococcal 65+ years Vaccine  Completed    Hepatitis C screen  Completed    Hepatitis A vaccine  Aged Out    Hib vaccine  Aged Out    Meningococcal (ACWY) vaccine  Aged Out       Hemoglobin A1C (%)   Date Value   05/04/2020 7.0 (H)   04/29/2019 6.7   10/29/2018 7.1             ( goal A1C is < 7)   Microalb/Crt.  Ratio (mcg/mg creat)   Date Value   11/05/2019 13     LDL Cholesterol (mg/dL)   Date Value   05/04/2020 50       (goal LDL is <100)   AST (U/L)   Date Value   10/23/2018 27     ALT (U/L)   Date Value   10/23/2018 22     BUN (mg/dL)   Date Value   09/10/2020 25 (H)     BP Readings from Last 3 Encounters:   06/05/20 133/70   12/03/19 110/68   11/07/19 108/62          (goal 120/80)          Patient Active Problem List:     S/P total knee arthroplasty     Type 2 diabetes mellitus with diabetic nephropathy, without long-term current use of insulin (HCC)     Hypertension     Asthma     Arthritis     Acquired trigger finger     Osteoarthritis of knee     Morbid obesity with BMI of 40.0-44.9, adult ([facility] [address]

## 2020-09-28 NOTE — TELEPHONE ENCOUNTER
Refill request for Valsartan-HCTZ. If appropriate please send medication(s) to patients pharmacy. Next appt: None  Last appt: 8/28/2020    Health Maintenance   Topic Date Due    DTaP/Tdap/Td vaccine (1 - Tdap) 07/03/1964    Shingles Vaccine (1 of 2) 07/03/1995    Diabetic retinal exam  12/12/2018    Diabetic foot exam  03/19/2019    Annual Wellness Visit (AWV)  05/29/2019    Flu vaccine (1) 09/01/2020    A1C test (Diabetic or Prediabetic)  05/04/2021    Lipid screen  05/04/2021    Potassium monitoring  09/10/2021    Creatinine monitoring  09/10/2021    Colon cancer screen colonoscopy  07/14/2030    DEXA (modify frequency per FRAX score)  Completed    Pneumococcal 65+ years Vaccine  Completed    Hepatitis C screen  Completed    Hepatitis A vaccine  Aged Out    Hib vaccine  Aged Out    Meningococcal (ACWY) vaccine  Aged Out       Hemoglobin A1C (%)   Date Value   05/04/2020 7.0 (H)   04/29/2019 6.7   10/29/2018 7.1             ( goal A1C is < 7)   Microalb/Crt.  Ratio (mcg/mg creat)   Date Value   11/05/2019 13     LDL Cholesterol (mg/dL)   Date Value   05/04/2020 50       (goal LDL is <100)   AST (U/L)   Date Value   10/23/2018 27     ALT (U/L)   Date Value   10/23/2018 22     BUN (mg/dL)   Date Value   09/10/2020 25 (H)     BP Readings from Last 3 Encounters:   06/05/20 133/70   12/03/19 110/68   11/07/19 108/62          (goal 120/80)          Patient Active Problem List:     S/P total knee arthroplasty     Type 2 diabetes mellitus with diabetic nephropathy, without long-term current use of insulin (HCC)     Hypertension     Asthma     Arthritis     Acquired trigger finger     Osteoarthritis of knee     Morbid obesity with BMI of 40.0-44.9, adult ([facility] [address]

## 2020-09-29 RX ORDER — VALSARTAN AND HYDROCHLOROTHIAZIDE 160; 12.5 MG/1; MG/1
TABLET, FILM COATED ORAL
Qty: 90 TABLET | Refills: 0 | Status: SHIPPED | OUTPATIENT
Start: 2020-09-29 | End: 2021-01-22

## 2020-10-01 ENCOUNTER — VIRTUAL VISIT (OUTPATIENT)
Dept: INTERNAL MEDICINE | Age: 75
End: 2020-10-01
Payer: MEDICARE

## 2020-10-01 PROCEDURE — 99441 PR PHYS/QHP TELEPHONE EVALUATION 5-10 MIN: CPT | Performed by: INTERNAL MEDICINE

## 2020-10-01 RX ORDER — ZOLPIDEM TARTRATE 5 MG/1
5 TABLET ORAL NIGHTLY PRN
Qty: 5 TABLET | Refills: 0 | Status: SHIPPED | OUTPATIENT
Start: 2020-10-01 | End: 2020-10-15

## 2020-10-01 RX ORDER — DULOXETIN HYDROCHLORIDE 60 MG/1
60 CAPSULE, DELAYED RELEASE ORAL DAILY
Qty: 30 CAPSULE | Refills: 1 | Status: SHIPPED
Start: 2020-10-01 | End: 2020-12-03 | Stop reason: SINTOL

## 2020-10-01 NOTE — PATIENT INSTRUCTIONS
Medications e-scribe to pharmacy of pt's choice. Patient to return to clinic 2 weeks (10/15/20). AVS reviewed and mailed to pt. It is very important for your care that you keep your appointment. If for some reason you are unable to keep your appointment it is equally important that you call our office at [phone] to cancel your appointment and reschedule. Failure to do so may result in your termination from our practice.   MB

## 2020-10-01 NOTE — PROGRESS NOTES
Mary Collado is a 76 y.o. female evaluated via telephone on 10/1/2020. Consent:  She and/or health care decision maker is aware that that she may receive a bill for this telephone service, depending on her insurance coverage, and has provided verbal consent to proceed: Yes      Documentation:  Depression  Patient complains of depression. She complains of depressed mood and insomnia. Onset was approximately several months ago. Symptoms have been unchanged since that time. Current symptoms include: depressed mood, difficulty concentrating and insomnia. Patient denies recurrent thoughts of death. Possible organic causes contributing are: none. Risk factors: none. Previous treatment includes medication. She complains of the following side effects from the treatment: none. Started on cymbalta 30mg and reports no effect. Diagnosis Orders   1. Anxiety  DULoxetine (CYMBALTA) 60 MG extended release capsule   2. Current mild episode of major depressive disorder without prior episode (HCC)  DULoxetine (CYMBALTA) 60 MG extended release capsule    Melatonin 3 MG CAPS    zolpidem (AMBIEN) 5 MG tablet   inc to 60mg  The problem of recurrent insomnia is discussed. Avoidance of caffeine sources is strongly encouraged. Sleep hygiene issues are reviewed. The use of sedative hypnotics for temporary relief is appropriate; we discussed the addictive nature of these drugs, and a one-time only prescription for prn use of a hypnotic is given, to use no more than 3 times per week for 2-3 weeks. Added melatonin as well to take nightly. I affirm this is a Patient Initiated Episode with a Patient who has not had a related appointment within my department in the past 7 days or scheduled within the next 24 hours.     Patient identification was verified at the start of the visit: Yes    Total Time: minutes: 5-10 minutes    Note: not billable if this call serves to triage the patient into an appointment for the relevant concern      Janessa Saenz

## 2020-10-06 ENCOUNTER — VIRTUAL VISIT (OUTPATIENT)
Dept: INTERNAL MEDICINE | Age: 75
End: 2020-10-06
Payer: MEDICARE

## 2020-10-06 PROCEDURE — G0439 PPPS, SUBSEQ VISIT: HCPCS | Performed by: NURSE PRACTITIONER

## 2020-10-06 ASSESSMENT — LIFESTYLE VARIABLES
HOW MANY STANDARD DRINKS CONTAINING ALCOHOL DO YOU HAVE ON A TYPICAL DAY: 0
HOW OFTEN DURING THE LAST YEAR HAVE YOU HAD A FEELING OF GUILT OR REMORSE AFTER DRINKING: 0
AUDIT-C TOTAL SCORE: 1
HAVE YOU OR SOMEONE ELSE BEEN INJURED AS A RESULT OF YOUR DRINKING: 0
HOW OFTEN DURING THE LAST YEAR HAVE YOU FAILED TO DO WHAT WAS NORMALLY EXPECTED FROM YOU BECAUSE OF DRINKING: 0
HOW OFTEN DURING THE LAST YEAR HAVE YOU BEEN UNABLE TO REMEMBER WHAT HAPPENED THE NIGHT BEFORE BECAUSE YOU HAD BEEN DRINKING: 0
HOW OFTEN DURING THE LAST YEAR HAVE YOU NEEDED AN ALCOHOLIC DRINK FIRST THING IN THE MORNING TO GET YOURSELF GOING AFTER A NIGHT OF HEAVY DRINKING: 0
HOW OFTEN DO YOU HAVE A DRINK CONTAINING ALCOHOL: 1
HAS A RELATIVE, FRIEND, DOCTOR, OR ANOTHER HEALTH PROFESSIONAL EXPRESSED CONCERN ABOUT YOUR DRINKING OR SUGGESTED YOU CUT DOWN: 0
AUDIT TOTAL SCORE: 1
HOW OFTEN DO YOU HAVE SIX OR MORE DRINKS ON ONE OCCASION: 0
HOW OFTEN DURING THE LAST YEAR HAVE YOU FOUND THAT YOU WERE NOT ABLE TO STOP DRINKING ONCE YOU HAD STARTED: 0

## 2020-10-06 ASSESSMENT — PATIENT HEALTH QUESTIONNAIRE - PHQ9
2. FEELING DOWN, DEPRESSED OR HOPELESS: 1
SUM OF ALL RESPONSES TO PHQ QUESTIONS 1-9: 1
SUM OF ALL RESPONSES TO PHQ QUESTIONS 1-9: 1
1. LITTLE INTEREST OR PLEASURE IN DOING THINGS: 0
SUM OF ALL RESPONSES TO PHQ9 QUESTIONS 1 & 2: 1

## 2020-10-06 NOTE — PROGRESS NOTES
Medicare Annual Wellness Visit  Are Name: Santhosh Zarco Date: 10/6/2020   MRN: [id number] Sex: Female   Age: 76 y.o. Ethnicity: Non-/Non    : 1945 Race: [place] Mix is here for Medicare AWV and Health Maintenance (eye exam done- Dr. Ranjith Cuadra foot exam- records requested for eye and foot, declined vaccinations)    Screenings for behavioral, psychosocial and functional/safety risks, and cognitive dysfunction are all negative except as indicated below. These results, as well as other patient data from the [address] form, are documented in Flowsheets linked to this Encounter. Allergies   Allergen Reactions    Aspirin Other (See Comments)         Prior to Visit Medications    Medication Sig Taking? Authorizing Provider   DULoxetine (CYMBALTA) 60 MG extended release capsule Take 1 capsule by mouth daily Yes Angella Spear MD   Melatonin 3 MG CAPS Take 1 capsule by mouth nightly Yes Angella Spear MD   zolpidem (AMBIEN) 5 MG tablet Take 1 tablet by mouth nightly as needed for Sleep for up to 14 days.  Yes Angella Spear MD   valsartan-hydrochlorothiazide (DIOVAN-HCT) 160-12.5 MG per tablet TAKE ONE TABLET BY MOUTH DAILY Yes Angella Spear MD   metFORMIN (GLUCOPHAGE) 1000 MG tablet TAKE ONE TABLET BY MOUTH TWICE A DAY WITH MEALS Yes Angella Spear MD   glimepiride (AMARYL) 4 MG tablet TAKE ONE TABLET BY MOUTH EVERY MORNING BEFORE BREAKFAST Yes Angella Spear MD   Latanoprost 0.005 % EMUL latanoprost 0.005 % eye drops Yes Historical Provider, MD   simvastatin (ZOCOR) 40 MG tablet Take 1 tablet by mouth nightly Yes Angella Spear MD   fluticasone (FLONASE) 50 MCG/ACT nasal spray 2 sprays by Nasal route daily Yes Sugar Mireles MD   albuterol sulfate  (90 Base) MCG/ACT inhaler Inhale 2 puffs into the lungs every 6 hours as needed for Wheezing Yes Sugar Mireles MD   fluticasone (FLOVENT HFA) 110 MCG/ACT inhaler Inhale 2 puffs into the lungs 2 times daily Yes Allan Paz Ryan MD   acetaminophen (TYLENOL) 500 MG tablet Take 500 mg by mouth Yes Historical Provider, MD   aspirin 81 MG tablet Take 81 mg by mouth daily Yes Historical Provider, MD         Past Medical History:   Diagnosis Date    Acquired trigger finger 4/27/2018    [facility]    Allergic rhinitis     Anesthesia complication     after or bp dropped kidney labs elevated for few years    Anxiety     Arthritis     Asthma     Carotid stenosis, asymptomatic     16-49%    CKD (chronic kidney disease) stage 3, GFR 30-59 ml/min ([facility])     Diabetes mellitus ([facility] [address]     Dry skin     Dyspnea     Frequency of urination     History of bronchitis     Hyperkalemia 1/24/2014    Hypertension     Knee pain, left     Obesity     Obsessive compulsive disorder     Osteoarthritis of knee 4/27/2018    Post-nasal drip     S/P total knee arthroplasty 1/24/2014    Sleep apnea     Urgency incontinence     Urinary, incontinence, stress female     Wears glasses     Wears partial dentures     upper       Past Surgical History:   Procedure Laterality Date    BACK SURGERY      CARPAL TUNNEL RELEASE Bilateral     10 yrs ago    CATARACT REMOVAL WITH IMPLANT Bilateral     DILATION AND CURETTAGE OF UTERUS      EYE SURGERY      JOINT REPLACEMENT      LUMBAR DISCECTOMY      TONSILLECTOMY      TOTAL KNEE ARTHROPLASTY Right     TOTAL KNEE ARTHROPLASTY      TOTAL KNEE ARTHROPLASTY Left 01/23/2014         Family History   Problem Relation Age of Onset    Heart Disease Mother     Heart Disease Brother     Stroke Other     Colon Cancer Other     Coronary Art Dis Other        CareTeam (Including outside providers/suppliers regularly involved in providing care):   Patient Care Team:  Kyle Espino MD as PCP - General (Internal Medicine)  Kyle Espino MD as PCP - [facility] Empaneled Provider  Axel Lobato MD as Consulting Physician (Pulmonology)    Wt Readings from Last 3 Encounters:   06/05/20 208 lb (94.3 kg)   05/07/20 204 lb (92.5 kg)   12/03/19 201 lb (91.2 kg)      Patient-Reported Vitals 10/1/2020   Patient-Reported Weight 202   Patient-Reported Height 5      There is no height or weight on file to calculate BMI. Based upon direct observation of the patient, evaluation of cognition reveals recent and remote memory intact. Wt Readings from Last 3 Encounters:   06/05/20 208 lb (94.3 kg)   05/07/20 204 lb (92.5 kg)   12/03/19 201 lb (91.2 kg)     Temp Readings from Last 3 Encounters:   06/05/20 97.6 °F (36.4 °C)   12/03/19 97.6 °F (36.4 °C) (Oral)   09/26/18 99.3 °F (37.4 °C) (Oral)     BP Readings from Last 3 Encounters:   06/05/20 133/70   12/03/19 110/68   11/07/19 108/62     Pulse Readings from Last 3 Encounters:   06/05/20 108   12/03/19 95   11/07/19 93       Patient's complete Health Risk Assessment and screening values have been reviewed and are found in Flowsheets. The following problems were reviewed today and where indicated follow up appointments were made and/or referrals ordered. Positive Risk Factor Screenings with Interventions:     General Health:  General  In general, how would you say your health is?: Good  In the past 7 days, have you experienced any of the following? New or Increased Pain, New or Increased Fatigue, Loneliness, Social Isolation, Stress or Anger?: None of These  Do you get the social and emotional support that you need?: Yes  Do you have a Living Will?: (!) No  General Health Risk Interventions:  · No Living Will: patient declined    Health Habits/Nutrition:  Health Habits/Nutrition  Do you exercise for at least 20 minutes 2-3 times per week?: (!) No  Have you lost any weight without trying in the past 3 months?: No  Do you eat fewer than 2 meals per day?: No  Have you seen a dentist within the past year?: (!) No  There is no height or weight on file to calculate BMI.   Health Habits/Nutrition Interventions:  · Inadequate physical activity:  patient agrees to exercise for at least 150 minutes/week, educational materials provided to promote increased physical activity  · Nutritional issues:  educational materials for healthy, well-balanced diet provided  · Dental exam overdue:  patient declines dental evaluation    Personalized Preventive Plan   Current Health Maintenance Status  Immunization History   Administered Date(s) Administered    Influenza, Quadv, IM, (6 mo and older Fluzone, Flulaval, Fluarix and 3 yrs and older Afluria) 09/20/2017, 09/26/2018, 11/07/2019    Influenza, Triv, 3 Years and older, IM (Afluria (5 yrs and older) 09/14/2016    Pneumococcal Conjugate 13-valent ([id number]) 10/26/2016    Pneumococcal Polysaccharide ([id number]) 10/08/2008, 03/27/2017    Tetanus Toxoid, absorbed 11/11/2004        Health Maintenance   Topic Date Due    Diabetic retinal exam  12/12/2018    Diabetic foot exam  03/19/2019    Annual Wellness Visit (AWV)  05/29/2019    DTaP/Tdap/Td vaccine (1 - Tdap) 10/06/2021 (Originally 7/3/1964)    Flu vaccine (1) 10/06/2021 (Originally 9/1/2020)    Shingles Vaccine (1 of 2) 10/06/2021 (Originally 7/3/1995)    A1C test (Diabetic or Prediabetic)  05/04/2021    Lipid screen  05/04/2021    Potassium monitoring  09/10/2021    Creatinine monitoring  09/10/2021    Colon cancer screen colonoscopy  07/14/2030    DEXA (modify frequency per FRAX score)  Completed    Pneumococcal 65+ years Vaccine  Completed    Hepatitis C screen  Completed    Hepatitis A vaccine  Aged Out    Hib vaccine  Aged Out    Meningococcal (ACWY) vaccine  Aged Out     Recommendations for [employer] Due: see orders and patient instructions/AVS.  . Recommended screening schedule for the next 5-10 years is provided to the patient in written form: see Patient Instructions/AVS.    Russ Singh was seen today for medicare awv and health maintenance.     Diagnoses and all orders for this visit:    BMI 40.0-44.9, adult ([address]  -     [place] Behavior  obesity 15m []    Routine general medical examination at a health care facility  -     [place] Behavior  obesity 15m []  -     [place] DEPRESSION SCREEN ANNUAL              Edwin Collado is a 76 y.o. female being evaluated by a Virtual Visit (phone) encounter to address concerns as mentioned above. A caregiver was present when appropriate. Due to this being a TeleHealth encounter (During [facility] public health emergency), evaluation of the following organ systems was limited: Vitals/Constitutional/EENT/Resp/CV/GI//MS/Neuro/Skin/Heme-Lymph-Imm. Pursuant to the emergency declaration under the [address] [place] [address], [address] [place] [address] authority and the [employer] Act, this Virtual Visit was conducted with patient's (and/or legal guardian's) consent, to reduce the patient's risk of exposure to COVID-19 and provide necessary medical care. The patient (and/or legal guardian) has also been advised to contact this office for worsening conditions or problems, and seek emergency medical treatment and/or call 911 if deemed necessary. Patient identification was verified at the start of the visit: Yes    Services were provided through phone to substitute for in-person clinic visit. Patient and provider were located at their individual homes. --TJ Singer - CNP on 10/6/2020 at 10:11 AM    An electronic signature was used to authenticate this note. Obesity Counseling: Assessed behavioral health risks and factors affecting choice of behavior. Suggested weight control approaches, including dietary changes behavioral modification and follow up plan. Provided educational and support documentation.   Time spent (minutes): 3

## 2020-10-06 NOTE — PATIENT INSTRUCTIONS
Body Mass Index: Care Instructions  Your Care Instructions     Body mass index (BMI) can help you see if your weight is raising your risk for health problems. It uses a formula to compare how much you weigh with how tall you are. · A BMI lower than 18.5 is considered underweight. · A BMI between 18.5 and 24.9 is considered healthy. · A BMI between 25 and 29.9 is considered overweight. A BMI of 30 or higher is considered obese. If your BMI is in the normal range, it means that you have a lower risk for weight-related health problems. If your BMI is in the overweight or obese range, you may be at increased risk for weight-related health problems, such as high blood pressure, heart disease, stroke, arthritis or joint pain, and diabetes. If your BMI is in the underweight range, you may be at increased risk for health problems such as fatigue, lower protection (immunity) against illness, muscle loss, bone loss, hair loss, and hormone problems. BMI is just one measure of your risk for weight-related health problems. You may be at higher risk for health problems if you are not active, you eat an unhealthy diet, or you drink too much alcohol or use tobacco products. Follow-up care is a key part of your treatment and safety. Be sure to make and go to all appointments, and call your doctor if you are having problems. It's also a good idea to know your test results and keep a list of the medicines you take. How can you care for yourself at home? · Practice healthy eating habits. This includes eating plenty of fruits, vegetables, whole grains, lean protein, and low-fat dairy. · If your doctor recommends it, get more exercise. Walking is a good choice. Bit by bit, increase the amount you walk every day. Try for at least 30 minutes on most days of the week. · Do not smoke. Smoking can increase your risk for health problems. If you need help quitting, talk to your doctor about stop-smoking programs and medicines. These can increase your chances of quitting for good. · Limit alcohol to 2 drinks a day for men and 1 drink a day for women. Too much alcohol can cause health problems. If you have a BMI higher than 25  · Your doctor may do other tests to check your risk for weight-related health problems. This may include measuring the distance around your waist. A waist measurement of more than 40 inches in men or 35 inches in women can increase the risk of weight-related health problems. · Talk with your doctor about steps you can take to stay healthy or improve your health. You may need to make lifestyle changes to lose weight and stay healthy, such as changing your diet and getting regular exercise. If you have a BMI lower than 18.5  · Your doctor may do other tests to check your risk for health problems. · Talk with your doctor about steps you can take to stay healthy or improve your health. You may need to make lifestyle changes to gain or maintain weight and stay healthy, such as getting more healthy foods in your diet and doing exercises to build muscle. Where can you learn more? Go to https://Spaceport.iopeMixaloojohnHotelTonight.[employer] Log[employer]. org and sign in to your BUILD account. Enter S176 in the Advanced Patient Care box to learn more about \"Body Mass Index: Care Instructions. \"     If you do not have an account, please click on the \"Sign Up Now\" link. Current as of: December 11, 2019               Content Version: 12.5  © 3091-9154 Healthwise, Incorporated. Care instructions adapted under license by [facility]. If you have questions about a medical condition or this instruction, always ask your healthcare professional. [place] [address] any warranty or liability for your use of this information. Learning About Low-Carbohydrate Diets  What is a low-carbohydrate diet? A low-carbohydrate (or \"low-carb\") diet limits foods and drinks that have carbohydrates.  This includes grains, fruits, milk and groups: grains, vegetables, fruits, milk and milk products, and meat and beans. Some people may eat more of their favorite foods from only one food group and, as a result, miss getting the nutrients they need. So, it is important to pay attention not only to what you eat but also to what you are missing from your diet. You can eat a healthy, balanced diet by making a few small changes. Follow-up care is a key part of your treatment and safety. Be sure to make and go to all appointments, and call your doctor if you are having problems. It's also a good idea to know your test results and keep a list of the medicines you take. How can you care for yourself at home? Look at what you eat  · Keep a food diary for a week or two and record everything you eat or drink. Track the number of servings you eat from each food group. · For a balanced diet every day, eat a variety of:  ? 6 or more ounce-equivalents of grains, such as cereals, breads, crackers, rice, or pasta, every day. An ounce-equivalent is 1 slice of bread, 1 cup of ready-to-eat cereal, or ½ cup of cooked rice, cooked pasta, or cooked cereal.  ? 2½ cups of vegetables, especially:  § Dark-green vegetables such as broccoli and spinach. § Orange vegetables such as carrots and sweet potatoes. § Dry beans (such as martinez and kidney beans) and peas (such as lentils). ? 2 cups of fresh, frozen, or canned fruit. A small apple or 1 banana or orange equals 1 cup. ? 3 cups of nonfat or low-fat milk, yogurt, or other milk products. ? 5½ ounces of meat and beans, such as chicken, fish, lean meat, beans, nuts, and seeds. One egg, 1 tablespoon of peanut butter, ½ ounce nuts or seeds, or ¼ cup of cooked beans equals 1 ounce of meat. · Learn how to read food labels for serving sizes and ingredients. Fast-food and convenience-food meals often contain few or no fruits or vegetables. Make sure you eat some fruits and vegetables to make the meal more nutritious.   · Look at your food diary. For each food group, add up what you have eaten and then divide the total by the number of days. This will give you an idea of how much you are eating from each food group. See if you can find some ways to change your diet to make it more healthy. Start small  · Do not try to make dramatic changes to your diet all at once. You might feel that you are missing out on your favorite foods and then be more likely to fail. · Start slowly, and gradually change your habits. Try some of the following:  ? Use whole wheat bread instead of white bread. ? Use nonfat or low-fat milk instead of whole milk. ? Eat brown rice instead of white rice, and eat whole wheat pasta instead of white-flour pasta. ? Try low-fat cheeses and low-fat yogurt. ? Add more fruits and vegetables to meals and have them for snacks. ? Add lettuce, tomato, cucumber, and onion to sandwiches. ? Add fruit to yogurt and cereal.  Enjoy food  · You can still eat your favorite foods. You just may need to eat less of them. If your favorite foods are high in fat, salt, and sugar, limit how often you eat them, but do not cut them out entirely. · Eat a wide variety of foods. Make healthy choices when eating out  · The type of restaurant you choose can help you make healthy choices. Even fast-food chains are now offering more low-fat or healthier choices on the menu. · Choose smaller portions, or take half of your meal home. · When eating out, try:  ? A veggie pizza with a whole wheat crust or grilled chicken (instead of sausage or pepperoni). ? Pasta with roasted vegetables, grilled chicken, or marinara sauce instead of cream sauce. ? A vegetable wrap or grilled chicken wrap. ? Broiled or poached food instead of fried or breaded items. Make healthy choices easy  · Buy packaged, prewashed, ready-to-eat fresh vegetables and fruits, such as baby carrots, salad mixes, and chopped or shredded broccoli and cauliflower.   · Buy packaged, presliced fruits, such as melon or pineapple. · Choose 100% fruit or vegetable juice instead of soda. Limit juice intake to 4 to 6 oz (½ to ¾ cup) a day. · Blend low-fat yogurt, fruit juice, and canned or frozen fruit to make a smoothie for breakfast or a snack. Where can you learn more? Go to https://chpepiceweb.Tocomail. org and sign in to your Moobia account. Enter B822 in the [employer] box to learn more about \"Eating Healthy Foods: Care Instructions. \"     If you do not have an account, please click on the \"Sign Up Now\" link. Current as of: August 22, 2019               Content Version: 12.5  © 8176-6636 Healthwise, Incorporated. Care instructions adapted under license by [facility] ([facility]). If you have questions about a medical condition or this instruction, always ask your healthcare professional. [place] [address] any warranty or liability for your use of this information. Personalized Preventive Plan for Adela Collado - 10/6/2020  Medicare offers a range of preventive health benefits. Some of the tests and screenings are paid in full while other may be subject to a deductible, co-insurance, and/or copay. Some of these benefits include a comprehensive review of your medical history including lifestyle, illnesses that may run in your family, and various assessments and screenings as appropriate. After reviewing your medical record and screening and assessments performed today your provider may have ordered immunizations, labs, imaging, and/or referrals for you. A list of these orders (if applicable) as well as your Preventive Care list are included within your After Visit Summary for your review. Other Preventive Recommendations:    · A preventive eye exam performed by an eye specialist is recommended every 1-2 years to screen for glaucoma; cataracts, macular degeneration, and other eye disorders.   · A preventive dental visit is recommended every 6 months. · Try to get at least 150 minutes of exercise per week or 10,000 steps per day on a pedometer . · Order or download the FREE \"Exercise & Physical Activity: Your Everyday Guide\" from [employer]. Call 1-348.146.4253 or search [employer] online. · You need 4399-2859 mg of calcium and 1484-4098 IU of vitamin D per day. It is possible to meet your calcium requirement with diet alone, but a vitamin D supplement is usually necessary to meet this goal.  · When exposed to the sun, use a sunscreen that protects against both UVA and UVB radiation with an SPF of 30 or greater. Reapply every 2 to 3 hours or after sweating, drying off with a towel, or swimming. · Always wear a seat belt when traveling in a car. Always wear a helmet when riding a bicycle or motorcycle. Return To Clinic 10/15/2020. After Visit Summary  mailed to patient. It is very important for your care that you keep your appointment. If for some reason you are unable to keep your appointment it is equally important that you call our office at [phone] to cancel your appointment and reschedule. Failure to do so may result in your termination from our practice.     SL

## 2020-10-15 ENCOUNTER — VIRTUAL VISIT (OUTPATIENT)
Dept: INTERNAL MEDICINE | Age: 75
End: 2020-10-15
Payer: MEDICARE

## 2020-10-15 PROCEDURE — 99441 PR PHYS/QHP TELEPHONE EVALUATION 5-10 MIN: CPT | Performed by: INTERNAL MEDICINE

## 2020-10-15 NOTE — PROGRESS NOTES
Jo Ann Collado is a 76 y.o. female evaluated via telephone on 10/15/2020. Consent:  She and/or health care decision maker is aware that that she may receive a bill for this telephone service, depending on her insurance coverage, and has provided verbal consent to proceed: Yes      Documentation:  Depression  Patient has a hx of depression. She complains of depressed mood and insomnia. cymbalta was increased at last visit to 60 mg and she does not feel it is effective. Her son is in the hospital due to mood disorder and some behavioral issues thought to be from a  Medication side effect- this has been worrisome and consuming her thoughts recently. Symptoms of depression overall have been unchanged since that time. Current symptoms include: depressed mood, difficulty concentrating and insomnia. Patient denies recurrent thoughts of death. Possible organic causes contributing are: none. Risk factors: none. Previous treatment includes medication- SSRI. She complains of the following side effects from the treatment: none. She was also rx Ambien to help her sleep intermittently. Taking 2.5mg and reports its very strong and she is groggy the next day for hours. Diagnosis Orders   1. Current mild episode of major depressive disorder without prior episode ([facility] [address]     2. Anxiety     continue cymbalta 60mg  f/u in office in 4 weeks       I affirm this is a Patient Initiated Episode with a Patient who has not had a related appointment within my department in the past 7 days or scheduled within the next 24 hours.     Patient identification was verified at the start of the visit: Yes    Total Time: minutes: 5-10 minutes    Note: not billable if this call serves to triage the patient into an appointment for the relevant concern      Jeri Parkinson   Fasting blood sugar 148

## 2020-10-15 NOTE — PATIENT INSTRUCTIONS
Patient to return to clinic 4 weeks (11/12/20)  AVS reviewed and mailed to pt. It is very important for your care that you keep your appointment. If for some reason you are unable to keep your appointment it is equally important that you call our office at [phone] to cancel your appointment and reschedule. Failure to do so may result in your termination from our practice.   MB

## 2020-10-16 ENCOUNTER — TELEPHONE (OUTPATIENT)
Dept: INTERNAL MEDICINE | Age: 75
End: 2020-10-16

## 2020-10-27 RX ORDER — TRAMADOL HYDROCHLORIDE 50 MG/1
TABLET ORAL
Qty: 13 TABLET | Refills: 0 | Status: SHIPPED | OUTPATIENT
Start: 2020-10-27 | End: 2020-11-26

## 2020-10-27 NOTE — TELEPHONE ENCOUNTER
Request for tramadol - med pended. Please fill if appropriate. Next Visit Date:  Future Appointments   Date Time Provider Eulalia Chongi   11/19/2020 12:40 PM Kathleen Bernal MD [facility]   12/9/2020  1:45 PM Sharlene Cuadra  [address] Maintenance   Topic Date Due    Diabetic foot exam  03/19/2019    DTaP/Tdap/Td vaccine (1 - Tdap) 10/06/2021 (Originally 7/3/1964)    Flu vaccine (1) 10/06/2021 (Originally 9/1/2020)    Shingles Vaccine (1 of 2) 10/06/2021 (Originally 7/3/1995)    A1C test (Diabetic or Prediabetic)  05/04/2021    Lipid screen  05/04/2021    Diabetic retinal exam  06/11/2021    Potassium monitoring  09/10/2021    Creatinine monitoring  09/10/2021    Annual Wellness Visit (AWV)  10/07/2021    Colon cancer screen colonoscopy  07/14/2030    DEXA (modify frequency per FRAX score)  Completed    Pneumococcal 65+ years Vaccine  Completed    Hepatitis C screen  Completed    Hepatitis A vaccine  Aged Out    Hib vaccine  Aged Out    Meningococcal (ACWY) vaccine  Aged Out       Hemoglobin A1C (%)   Date Value   05/04/2020 7.0 (H)   04/29/2019 6.7   10/29/2018 7.1             ( goal A1C is < 7)   Microalb/Crt.  Ratio (mcg/mg creat)   Date Value   11/05/2019 13     LDL Cholesterol (mg/dL)   Date Value   05/04/2020 50       (goal LDL is <100)   AST (U/L)   Date Value   10/23/2018 27     ALT (U/L)   Date Value   10/23/2018 22     BUN (mg/dL)   Date Value   09/10/2020 25 (H)     BP Readings from Last 3 Encounters:   06/05/20 133/70   12/03/19 110/68   11/07/19 108/62          (goal 120/80)    All Future Testing planned in [facility]        Patient Active Problem List:     S/P total knee arthroplasty     Type 2 diabetes mellitus with diabetic nephropathy, without long-term current use of insulin (HCC)     Hypertension     Asthma     Arthritis     Acquired trigger finger     Osteoarthritis of knee     Morbid obesity with BMI of 40.0-44.9, adult ([facility] [address]

## 2020-12-03 ENCOUNTER — TELEPHONE (OUTPATIENT)
Dept: INTERNAL MEDICINE | Age: 75
End: 2020-12-03

## 2020-12-03 RX ORDER — CITALOPRAM 40 MG/1
TABLET ORAL
Qty: 90 TABLET | Refills: 3 | Status: SHIPPED | OUTPATIENT
Start: 2020-12-03 | End: 2022-02-24

## 2020-12-03 NOTE — TELEPHONE ENCOUNTER
PC form patient - patient called stating she was started on Duloxetine but it made her feel worse so she stopped taking it. Patient would like to be restarted on Celexa.

## 2021-01-22 RX ORDER — VALSARTAN AND HYDROCHLOROTHIAZIDE 160; 12.5 MG/1; MG/1
TABLET, FILM COATED ORAL
Qty: 90 TABLET | Refills: 0 | Status: SHIPPED | OUTPATIENT
Start: 2021-01-22 | End: 2021-05-28

## 2021-02-18 ENCOUNTER — TELEPHONE (OUTPATIENT)
Dept: INTERNAL MEDICINE | Age: 76
End: 2021-02-18

## 2021-02-18 NOTE — TELEPHONE ENCOUNTER
Pt cxled 2/182021 in person appt and is requesting another in person appt.  At this time no in-person appts are showing for Dr Gordy Regalado.

## 2021-02-19 NOTE — TELEPHONE ENCOUNTER
Phone call to patient, she is starting to watch her grandson and wants to wait till a later date to schedule appt. Patient will call back.

## 2021-04-08 ENCOUNTER — OFFICE VISIT (OUTPATIENT)
Dept: INTERNAL MEDICINE | Age: 76
End: 2021-04-08
Payer: MEDICARE

## 2021-04-08 VITALS
DIASTOLIC BLOOD PRESSURE: 70 MMHG | WEIGHT: 193.6 LBS | TEMPERATURE: 97.5 F | HEART RATE: 96 BPM | BODY MASS INDEX: 38.01 KG/M2 | SYSTOLIC BLOOD PRESSURE: 111 MMHG | HEIGHT: 60 IN

## 2021-04-08 DIAGNOSIS — M17.0 OSTEOARTHRITIS OF BOTH KNEES, UNSPECIFIED OSTEOARTHRITIS TYPE: ICD-10-CM

## 2021-04-08 DIAGNOSIS — I65.23 BILATERAL CAROTID ARTERY STENOSIS: ICD-10-CM

## 2021-04-08 DIAGNOSIS — E66.01 MORBID OBESITY WITH BMI OF 40.0-44.9, ADULT (HCC): ICD-10-CM

## 2021-04-08 DIAGNOSIS — E11.21 TYPE 2 DIABETES MELLITUS WITH DIABETIC NEPHROPATHY, WITHOUT LONG-TERM CURRENT USE OF INSULIN (HCC): Primary | ICD-10-CM

## 2021-04-08 DIAGNOSIS — F32.0 CURRENT MILD EPISODE OF MAJOR DEPRESSIVE DISORDER WITHOUT PRIOR EPISODE (HCC): ICD-10-CM

## 2021-04-08 DIAGNOSIS — H40.89 OTHER SPECIFIED GLAUCOMA, UNSPECIFIED LATERALITY: ICD-10-CM

## 2021-04-08 DIAGNOSIS — R06.09 DYSPNEA ON EXERTION: ICD-10-CM

## 2021-04-08 DIAGNOSIS — J45.30 MILD PERSISTENT ASTHMA WITHOUT COMPLICATION: ICD-10-CM

## 2021-04-08 LAB — HBA1C MFR BLD: 6.4 %

## 2021-04-08 PROCEDURE — 83036 HEMOGLOBIN GLYCOSYLATED A1C: CPT | Performed by: INTERNAL MEDICINE

## 2021-04-08 PROCEDURE — 1123F ACP DISCUSS/DSCN MKR DOCD: CPT | Performed by: INTERNAL MEDICINE

## 2021-04-08 PROCEDURE — G8417 CALC BMI ABV UP PARAM F/U: HCPCS | Performed by: INTERNAL MEDICINE

## 2021-04-08 PROCEDURE — 1036F TOBACCO NON-USER: CPT | Performed by: INTERNAL MEDICINE

## 2021-04-08 PROCEDURE — 3017F COLORECTAL CA SCREEN DOC REV: CPT | Performed by: INTERNAL MEDICINE

## 2021-04-08 PROCEDURE — G8427 DOCREV CUR MEDS BY ELIG CLIN: HCPCS | Performed by: INTERNAL MEDICINE

## 2021-04-08 PROCEDURE — 1090F PRES/ABSN URINE INCON ASSESS: CPT | Performed by: INTERNAL MEDICINE

## 2021-04-08 PROCEDURE — 99214 OFFICE O/P EST MOD 30 MIN: CPT | Performed by: INTERNAL MEDICINE

## 2021-04-08 PROCEDURE — 2022F DILAT RTA XM EVC RTNOPTHY: CPT | Performed by: INTERNAL MEDICINE

## 2021-04-08 PROCEDURE — 4040F PNEUMOC VAC/ADMIN/RCVD: CPT | Performed by: INTERNAL MEDICINE

## 2021-04-08 PROCEDURE — 3044F HG A1C LEVEL LT 7.0%: CPT | Performed by: INTERNAL MEDICINE

## 2021-04-08 PROCEDURE — G8399 PT W/DXA RESULTS DOCUMENT: HCPCS | Performed by: INTERNAL MEDICINE

## 2021-04-08 ASSESSMENT — PATIENT HEALTH QUESTIONNAIRE - PHQ9
SUM OF ALL RESPONSES TO PHQ QUESTIONS 1-9: 0
SUM OF ALL RESPONSES TO PHQ QUESTIONS 1-9: 0
2. FEELING DOWN, DEPRESSED OR HOPELESS: 0
SUM OF ALL RESPONSES TO PHQ QUESTIONS 1-9: 0
1. LITTLE INTEREST OR PLEASURE IN DOING THINGS: 0

## 2021-04-08 ASSESSMENT — ENCOUNTER SYMPTOMS
SHORTNESS OF BREATH: 1
EYES NEGATIVE: 1
GASTROINTESTINAL NEGATIVE: 1
BACK PAIN: 1

## 2021-04-08 NOTE — PROGRESS NOTES
[facility]\""   Progress Note        Date of patient's visit: 4/8/2021  Patient's Name:  Diamond Collado                   YOB: 1945        PCP:  Rober Louise MD    Diamond Collado is a 76 y.o. female who presents for   Chief Complaint   Patient presents with    Diabetes    Hypertension    Health Maintenance     Due for foot exam and Covid vaccine    and follow up of chronic medical problems. Patient Active Problem List   Diagnosis    S/P total knee arthroplasty    Type 2 diabetes mellitus with diabetic nephropathy, without long-term current use of insulin ([facility] [address]    Hypertension    Asthma    Arthritis    Acquired trigger finger    Osteoarthritis of knee    Morbid obesity with BMI of 40.0-44.9, adult ([facility] [address]       HISTORY OF PRESENT ILLNESS:    History was obtained from the patient. 70-year-old female here for a routine follow-up with no acute issues today. Patient has a history of type 2 diabetes mellitus with her A1c today in the office being 6.4 down from 7.0 and patient continues to take Metformin and Amaryl. Patient has lost 15 pounds since her last visit and continues to make lifestyle changes. Patient has hypertension which is well controlled on Diovan. She does report that she urinates a lot at night but she does drink most of her water after dinner when she is sitting in front of the television relaxing. Patient also does take Zocor and a baby aspirin daily and denies any side effects of myalgia or GI upset. Patient continues to take her Celexa nightly and reports stable mood symptoms denies any low moods or suicidal ideations. She reports a good support system and again has been making lifestyle modifications and has lost 15 pounds. Patient has chronic osteoarthritis of the knee with a knee replacement as well as arthritis in multiple other joints.   Patient reports that she has in recent weeks began to have difficulty ambulating long distances she feels more short of breath. Patient does have a history of asthma follows with pulmonologist Dr. Trudy Barnhart and he is compliant with her fluticasone inhaler twice a day and rarely uses her albuterol rescue inhaler. Patient denies any chest tightness or chest pain does not know any audible wheezing. She does report that her symptoms do improve at rest.  Patient also has bilateral carotid stenosis for which she sees vascular physician and gets carotid scans every 6 months her last scan reports minimal change and she is not at this time a candidate for stenting. Patient also has appointment set up with ophthalmology for follow-up with previously treated cataracts and new onset glaucoma. Symptoms patient's allergies, medications, past medical, surgical, social and family histories were reviewed and updated as appropriate.     ALLERGIES      Allergies   Allergen Reactions    Aspirin Other (See Comments)         MEDICATIONS:      Current Outpatient Medications   Medication Sig Dispense Refill    valsartan-hydrochlorothiazide (DIOVAN-HCT) 160-12.5 MG per tablet TAKE ONE TABLET BY MOUTH DAILY 90 tablet 0    citalopram (CELEXA) 40 MG tablet TAKE ONE TABLET BY MOUTH DAILY 90 tablet 3    Melatonin 3 MG CAPS Take 1 capsule by mouth nightly (Patient not taking: Reported on 10/15/2020) 30 capsule 2    metFORMIN (GLUCOPHAGE) 1000 MG tablet TAKE ONE TABLET BY MOUTH TWICE A DAY WITH MEALS 180 tablet 4    glimepiride (AMARYL) 4 MG tablet TAKE ONE TABLET BY MOUTH EVERY MORNING BEFORE BREAKFAST 90 tablet 1    Latanoprost 0.005 % EMUL latanoprost 0.005 % eye drops      simvastatin (ZOCOR) 40 MG tablet Take 1 tablet by mouth nightly 90 tablet 3    fluticasone (FLONASE) 50 MCG/ACT nasal spray 2 sprays by Nasal route daily 1 Bottle 11    albuterol sulfate  (90 Base) MCG/ACT inhaler Inhale 2 puffs into the lungs every 6 hours as needed for Wheezing 1 Inhaler 11    fluticasone (FLOVENT HFA) 110 MCG/ACT inhaler Inhale 2 puffs into the lungs 2 times daily 1 Inhaler 11    acetaminophen (TYLENOL) 500 MG tablet Take 500 mg by mouth      aspirin 81 MG tablet Take 81 mg by mouth daily       No current facility-administered medications for this visit.         Patient Care Team:  Norma Ferris MD as PCP - General (Internal Medicine)  Norma Ferris MD as PCP - [facility] Empaneled Provider  Gautam Williamson MD as Consulting Physician (Pulmonology)    PAST MEDICAL AND SURGICAL HISTORY:      Past Medical History:   Diagnosis Date    Acquired trigger finger 2018    ROSELIA (acute kidney injury) ([facility] [address] 2011    Allergic rhinitis     Anesthesia complication     after or bp dropped kidney labs elevated for few years    Anxiety     Arthritis     Asthma     Carotid stenosis, asymptomatic     16-49%    CKD (chronic kidney disease) stage 3, GFR 30-59 ml/min ([facility])     Diabetes mellitus ([facility] [address] [facility]     Dry skin     Dyspnea     Frequency of urination     History of bronchitis     Hyperkalemia 2014    Hypertension     Knee pain, left     Obesity     Obsessive compulsive disorder     Osteoarthritis of knee 2018    Post-nasal drip     S/P total knee arthroplasty 2014    Sleep apnea     Urgency incontinence     Urinary, incontinence, stress female     Wears glasses     Wears partial dentures     upper     Past Surgical History:   Procedure Laterality Date    BACK SURGERY      CARPAL TUNNEL RELEASE Bilateral     10 yrs ago    CATARACT REMOVAL WITH IMPLANT Bilateral     DILATION AND CURETTAGE OF UTERUS      EYE SURGERY      JOINT REPLACEMENT      LUMBAR DISCECTOMY      TONSILLECTOMY      TOTAL KNEE ARTHROPLASTY Right     TOTAL KNEE ARTHROPLASTY      TOTAL KNEE ARTHROPLASTY Left 2014       SOCIAL HISTORY      Social History     Tobacco Use    Smoking status: Former Smoker     Packs/day: 1.00     Types: Cigarettes     Quit date: 1998     Years since quittin.2    Smokeless tobacco: Never Used   Substance Use Topics    Alcohol use: Yes     Comment: erickson Marr  reports that she quit smoking about 23 years ago. Her smoking use included cigarettes. She smoked 1.00 pack per day. She has never used smokeless tobacco.    FAMILY HISTORY:      Family History   Problem Relation Age of Onset    Heart Disease Mother     Heart Disease Brother     Stroke Other     Colon Cancer Other     Coronary Art Dis Other        REVIEW OF SYSTEMS:    Review of Systems   Constitutional: Positive for unexpected weight change. Eyes: Negative. Respiratory: Positive for shortness of breath. Cardiovascular: Negative. Gastrointestinal: Negative. Endocrine: Negative. Genitourinary: Positive for frequency. Musculoskeletal: Positive for back pain. Skin: Negative. Hematological: Negative. Psychiatric/Behavioral: Negative.         PHYSICAL EXAM:      Vitals:    04/08/21 1346   BP: 111/70   Pulse: 96   Temp: 97.5 °F (36.4 °C)     BP Readings from Last 3 Encounters:   04/08/21 111/70   06/05/20 133/70   12/03/19 110/68       Physical Examination: General appearance - overweight  Mental status - alert, oriented to person, place, and time  Chest - clear to auscultation, no wheezes, rales or rhonchi, symmetric air entry  Heart - normal rate and regular rhythm  Abdomen - bowel sounds normal  Back exam - full range of motion, no tenderness, palpable spasm or pain on motion  Neurological - alert, oriented, normal speech, no focal findings or movement disorder noted  Musculoskeletal - no joint tenderness, deformity or swelling  Extremities - no pedal edema noted    LABORATORY FINDINGS:    CBC:   Lab Results   Component Value Date    WBC 10.5 05/04/2020    HGB 11.5 05/04/2020     05/04/2020     BMP:    Lab Results   Component Value Date     09/10/2020    K 4.9 09/10/2020     09/10/2020    CO2 20 09/10/2020    BUN 25 09/10/2020    CREATININE 1.21 09/10/2020    GLUCOSE 200 09/10/2020    GLUCOSE 85 09/30/2011     Hemoglobin A1C:   Lab Results   Component Value Date    LABA1C 6.4 04/08/2021     Microalbumin Urine:   Lab Results   Component Value Date    MICROALBUR 35 11/05/2019     Lipid profile:   Lab Results   Component Value Date    CHOL 140 05/04/2020    TRIG 100 05/04/2020    HDL 70 05/04/2020     Thyroid functions:   Lab Results   Component Value Date    TSH 2.37 05/04/2020      Hepatic functions:   Lab Results   Component Value Date    ALT 22 10/23/2018    AST 27 10/23/2018    PROT 7.5 10/23/2018    BILITOT 0.45 10/23/2018    LABALBU 4.2 10/23/2018     Urine Analysis: No results found for: [address] [place]:      Health Maintenance Due   Topic Date Due    Diabetic foot exam  03/19/2019       ASSESSMENT AND PLAN:       Diagnosis Orders   1. Type 2 diabetes mellitus with diabetic nephropathy, without long-term current use of insulin (HCC)  POCT glycosylated hemoglobin (Hb A1C)   2. Dyspnea on exertion  ECHO Complete 2D W Doppler W Color   3. Current mild episode of major depressive disorder without prior episode ([facility] [address] [facility]     4. Morbid obesity with BMI of 40.0-44.9, adult ([facility] [address]  Handicap Placard MISC   5. Osteoarthritis of both knees, unspecified osteoarthritis type  Handicap Placard MISC   6. Mild persistent asthma without complication  Handicap Placard MISC   7. Bilateral carotid artery stenosis     8. Other specified glaucoma, unspecified laterality     Recommend patient follow-up with Dr. Sofia Baxter her pulmonologist and will obtain an echo at this point. Patient has improved glycemic control will continue with her current regimen at this time and reevaluate removal of Amaryl at next check. Would encourage patient to continue to lose weight and work on dietary modifications. Will obtain results of her last carotid scan to place in media        FOLLOW UP:   1. Corry Ramirez received counseling on the following healthy behaviors: nutrition and exercise    2. Reviewed prior labs and health maintenance.       3.  Discussed use, benefit, and side effects of prescribed medications. Barriers to medication compliance addressed. All patient questions answered. Pt voiced understanding. 4.  Continue current medications, diet and exercise. Orders Placed This Encounter   Medications    Handicap Placard MISC     Sig: by Does not apply route Dx: Osteoarthritis, asthma  Start 4/8/2021- 4/7/26     Dispense:  1 each     Refill:  0          Completed Refills               Requested Prescriptions     Signed Prescriptions Disp Refills    Handicap Placard MISC 1 each 0     Sig: by Does not apply route Dx: Osteoarthritis, asthma  Start 4/8/2021- 4/7/26       5. Patient given educational materials - see patient instructions    6. Was a self-tracking handout given in paper form or via [employer]? Yes  If yes, see orders or list here. Orders Placed This Encounter   Procedures    POCT glycosylated hemoglobin (Hb A1C)    ECHO Complete 2D W Doppler W Color     Standing Status:   Future     Standing Expiration Date:   10/8/2021     Order Specific Question:   Reason for exam:     Answer:   shortness of breath       Return in about 6 months (around 10/8/2021). Patient voiced understanding and agreed to treatment plan. This note is created with the assistance of a speech-recognition program. While intending to generate a document that actually reflects the content of the visit, the document can still have some mistakes which may not have been identified and corrected by editing.       Dr Iris Alfred MD, [address]  Associate , Department of Internal Medicine  Resident Ambulatory Site Medical Director  [facility] Internal Medicine  [address] [place] [address],[address]  Internal Medicine Clerkship - Bert Frausto                   4/8/2021, 2:35 PM

## 2021-04-08 NOTE — PATIENT INSTRUCTIONS
Printed and signed script for Handicap Placard given to pt. Patient to return to clinic 6 months (office will call and schedule appt)  AVS reviewed and given to pt. HEART/VASCULAR INSTRUCTIONS    Your doctor has ordered a/an Echocardiogram for you, this will be done at the [facility] [address] [facility] located at [address] [place] [address], across the street from [address] [place] [address]. On the day of your appointment, please report to Registration on the main floor of the [facility] [address] [facility], 20 minutes before your test to complete any needed paperwork. Day:      Date:        Time:    If you cannot keep this appointment, please call [phone] to cancel and reschedule your appointment. It is very important for your care that you keep your appointment. If for some reason you are unable to keep your appointment it is equally important that you call our office at [phone] to cancel your appointment and reschedule. Failure to do so may result in your termination from our practice.   MB

## 2021-04-09 ENCOUNTER — TELEPHONE (OUTPATIENT)
Dept: INTERNAL MEDICINE | Age: 76
End: 2021-04-09

## 2021-04-09 NOTE — TELEPHONE ENCOUNTER
FYI:    Phone call to Dr Morgan Favorite office requesting the results of the Carotid Duplex. Staff there stated that she had an appt in January and cancelled the appt and rescheduled it for July 29th. Patient did not have a recent Carotid Doppler. The last one done was 2020 and the results are in the patients chart.

## 2021-05-20 ENCOUNTER — HOSPITAL ENCOUNTER (OUTPATIENT)
Dept: NON INVASIVE DIAGNOSTICS | Age: 76
Discharge: HOME | End: 2021-05-20
Payer: MEDICARE

## 2021-05-20 DIAGNOSIS — R06.09 DYSPNEA ON EXERTION: ICD-10-CM

## 2021-05-20 LAB
LV EF: 48 %
LVEF MODALITY: NORMAL

## 2021-05-20 PROCEDURE — 93306 TTE W/DOPPLER COMPLETE: CPT

## 2021-05-21 ENCOUNTER — OFFICE VISIT (OUTPATIENT)
Dept: PULMONOLOGY | Age: 76
End: 2021-05-21
Payer: MEDICARE

## 2021-05-21 VITALS
SYSTOLIC BLOOD PRESSURE: 98 MMHG | BODY MASS INDEX: 37.89 KG/M2 | OXYGEN SATURATION: 99 % | TEMPERATURE: 96.8 F | HEART RATE: 102 BPM | DIASTOLIC BLOOD PRESSURE: 65 MMHG | HEIGHT: 60 IN | WEIGHT: 193 LBS

## 2021-05-21 DIAGNOSIS — J45.30 MILD PERSISTENT ASTHMA WITHOUT COMPLICATION: Primary | ICD-10-CM

## 2021-05-21 DIAGNOSIS — R06.09 DYSPNEA ON EXERTION: ICD-10-CM

## 2021-05-21 DIAGNOSIS — G47.33 OBSTRUCTIVE SLEEP APNEA: ICD-10-CM

## 2021-05-21 DIAGNOSIS — E66.01 MORBID OBESITY WITH BMI OF 40.0-44.9, ADULT (HCC): ICD-10-CM

## 2021-05-21 DIAGNOSIS — J30.9 ALLERGIC RHINITIS, UNSPECIFIED SEASONALITY, UNSPECIFIED TRIGGER: ICD-10-CM

## 2021-05-21 PROCEDURE — 3017F COLORECTAL CA SCREEN DOC REV: CPT | Performed by: INTERNAL MEDICINE

## 2021-05-21 PROCEDURE — 1123F ACP DISCUSS/DSCN MKR DOCD: CPT | Performed by: INTERNAL MEDICINE

## 2021-05-21 PROCEDURE — 1036F TOBACCO NON-USER: CPT | Performed by: INTERNAL MEDICINE

## 2021-05-21 PROCEDURE — G8417 CALC BMI ABV UP PARAM F/U: HCPCS | Performed by: INTERNAL MEDICINE

## 2021-05-21 PROCEDURE — G8399 PT W/DXA RESULTS DOCUMENT: HCPCS | Performed by: INTERNAL MEDICINE

## 2021-05-21 PROCEDURE — G8427 DOCREV CUR MEDS BY ELIG CLIN: HCPCS | Performed by: INTERNAL MEDICINE

## 2021-05-21 PROCEDURE — 4040F PNEUMOC VAC/ADMIN/RCVD: CPT | Performed by: INTERNAL MEDICINE

## 2021-05-21 PROCEDURE — 99214 OFFICE O/P EST MOD 30 MIN: CPT | Performed by: INTERNAL MEDICINE

## 2021-05-21 PROCEDURE — 1090F PRES/ABSN URINE INCON ASSESS: CPT | Performed by: INTERNAL MEDICINE

## 2021-05-21 RX ORDER — LATANOPROST 50 UG/ML
SOLUTION/ DROPS OPHTHALMIC
COMMUNITY
Start: 2021-04-20 | End: 2021-05-21

## 2021-05-21 RX ORDER — LORATADINE 10 MG/1
10 TABLET ORAL DAILY PRN
Qty: 30 TABLET | Refills: 5 | Status: SHIPPED | OUTPATIENT
Start: 2021-05-21

## 2021-05-21 ASSESSMENT — SLEEP AND FATIGUE QUESTIONNAIRES
HOW LIKELY ARE YOU TO NOD OFF OR FALL ASLEEP WHILE LYING DOWN TO REST IN THE AFTERNOON WHEN CIRCUMSTANCES PERMIT: 2
HOW LIKELY ARE YOU TO NOD OFF OR FALL ASLEEP IN A CAR, WHILE STOPPED FOR A FEW MINUTES IN TRAFFIC: 0
HOW LIKELY ARE YOU TO NOD OFF OR FALL ASLEEP WHILE SITTING INACTIVE IN A PUBLIC PLACE: 0
HOW LIKELY ARE YOU TO NOD OFF OR FALL ASLEEP WHILE WATCHING TV: 0
HOW LIKELY ARE YOU TO NOD OFF OR FALL ASLEEP WHILE SITTING AND READING: 0

## 2021-05-21 NOTE — PROGRESS NOTES
PULMONARY OUTPATIENT PROGRESS NOTE      Patient:  Grace Morales  YOB: 1945    MRN: [id number]     Acct:        Pt seen and Chart reviewed. Mr/Ms Rebekah Collado is here in followup for    Diagnosis   1. Obstructive sleep apnea    2. Mild persistent asthma without complication    3. Dyspnea on exertion    4. Allergic rhinitis, unspecified seasonality, unspecified trigger      She is here for follow-up of asthma, history of mild persistent asthma, allergic rhinitis, history of obstructive sleep apnea  Since she was seen last time she had not had asthma exacerbation or antibiotic use. According to patient she does complain of increased shortness of breath on activities and exertion. She is able to do regular activities going from one room to another room mostly without getting shortness of breath but she get shortness of breath on exertion activity when she had to use steps or if she has to do work fast or exertional activity. She is also complaining of more postnasal drip nasal allergies and congestion. According to patient she does not usually have cough until she start having more allergy symptoms last 3 weeks. Last 3 weeks she has to use albuterol once a week otherwise she does not use albuterol on regular basis and rare use of albuterol. He is taking Flovent twice daily and staying compliant with Flovent  Her asthma symptoms are stable she does not usually have wheezing. She denies nocturnal awakening with cough wheezing chest tightness. She denies daily cough daily sputum production or persistent wheezing. She does take Flonase for allergies during the season from spring to fall she take once daily. She is not taking antihistamine at this time. Since she was seen last time she's been stable. No exacerbation and no Abx and steroids use.   She had an echocardiogram done on 05/20/2021 which shows normal LV function grade 1 diastolic dysfunction normal RV systolic function. CXR 11/27/19 no acute or chronic changes, PFTs in December 2019 showed FEV1 82% and DLCO mildly reduced to 74% and normal lung volumes.     H/O intolerance to cpap and does not have cpap at home any more, does have daytime naps at times, usually have unrefresh sleep and wakes up every 2 hours and goes to sleep from 11 pm to 12 MN and wakes up around 9 to 10 am, positive nocturia  Used to smoke 1-2 PPD and quit 22 years ago and smoked for 30 years    Subjective:   Review of Systems -   General ROS: negative for weight loss, weight gain, fever or night sweats  ENT ROS: Positive for - nasal congestion and nasal discharge and postnasal drip  Allergy and Immunology ROS: Positive for - nasal congestion, postnasal drip   Respiratory ROS: Positive for - mild intermittent cough and shortness of breath on exertion , negative for - hemoptysis, orthopnea, sputum changes or wheezing  Cardiovascular ROS: positive for - dyspnea on exertion, negative for - chest pain, loss of consciousness, orthopnea or paroxysmal nocturnal dyspnea  Gastrointestinal ROS: no abdominal pain, change in bowel habits, or black or bloody stools  Musculoskeletal ROS: negative for joints pain, joint stiffness and swelling  : no hematuria and no dysuria and nocturia  CNS: no headache, dizziness or weakness, diplopia, vertigo, numbness and tingling  Hem/Onc: no easy bleeding and bruising and petechia  Skin: no rash or skin lesions    Positive for snoring, excessive daytime sleepiness, falling asleep while watching television, disrupted sleep, naps    Sleep Medicine 5/21/2021 6/5/2020 12/3/2019 9/26/2018 9/14/2016   Sitting and reading 0 0 0 0 0   Watching TV 0 0 0 0 0   Sitting, inactive in a public place (e.g. a theatre or a meeting) 0 0 1 0 0   As a passenger in a car for an hour without a break 0 0 0 0 0   Lying down to rest in the afternoon when circumstances permit 2 3 3 3 3   Sitting and talking to someone 0 0 0 0 0   Sitting quietly after a lunch without alcohol 0 0 0 0 0   In a car, while stopped for a few minutes in traffic 0 0 0 0 0   Total score 2 3 4 3 3       Allergies: Allergies   Allergen Reactions    Aspirin Other (See Comments)       Medications:  Outpatient Encounter Medications as of 5/21/2021   Medication Sig Dispense Refill    Handicap Placard MISC by Does not apply route Dx: Osteoarthritis, asthma  Start 4/8/2021- 4/7/26 1 each 0    valsartan-hydrochlorothiazide (DIOVAN-HCT) 160-12.5 MG per tablet TAKE ONE TABLET BY MOUTH DAILY 90 tablet 0    citalopram (CELEXA) 40 MG tablet TAKE ONE TABLET BY MOUTH DAILY 90 tablet 3    metFORMIN (GLUCOPHAGE) 1000 MG tablet TAKE ONE TABLET BY MOUTH TWICE A DAY WITH MEALS 180 tablet 4    glimepiride (AMARYL) 4 MG tablet TAKE ONE TABLET BY MOUTH EVERY MORNING BEFORE BREAKFAST 90 tablet 1    Latanoprost 0.005 % EMUL latanoprost 0.005 % eye drops      simvastatin (ZOCOR) 40 MG tablet Take 1 tablet by mouth nightly 90 tablet 3    fluticasone (FLONASE) 50 MCG/ACT nasal spray 2 sprays by Nasal route daily 1 Bottle 11    albuterol sulfate  (90 Base) MCG/ACT inhaler Inhale 2 puffs into the lungs every 6 hours as needed for Wheezing 1 Inhaler 11    fluticasone (FLOVENT HFA) 110 MCG/ACT inhaler Inhale 2 puffs into the lungs 2 times daily 1 Inhaler 11    acetaminophen (TYLENOL) 500 MG tablet Take 500 mg by mouth      aspirin 81 MG tablet Take 81 mg by mouth daily      [DISCONTINUED] latanoprost (XALATAN) 0.005 % ophthalmic solution  (Patient not taking: Reported on 5/21/2021)       No facility-administered encounter medications on file as of 5/21/2021. Objective:    Physical Exam:  Vitals:   BP 98/65 (Site: Left Upper Arm, Position: Sitting, Cuff Size: Medium Adult)   Pulse 102   Temp 96.8 °F (36 °C) (Temporal)   Ht 5' (1.524 m)   Wt 193 lb (87.5 kg)   SpO2 99%   BMI 37.69 kg/m²   Last 3 weights:    Wt Readings from Last 3 Encounters:   05/21/21 193 lb (87.5 kg)   04/08/21 193 lb 9.6 oz (87.8 kg)   06/05/20 208 lb (94.3 kg)     Body mass index is 37.69 kg/m². Physical Examination:   General appearance - alert, well appearing, and in no distress  Mental status - alert, oriented to person, place, and time  Eyes - pupils equal and reactive, extraocular eye movements intact  Nose - normal and patent, no erythema, discharge or polyps, mild pallor, and no stigmata of bleeding  Mouth - mucous membranes moist, pharynx normal without lesions, MP III ,small oropharynx  Neck - supple, no significant adenopathy, short and thick. Chest -no retractions or cyanosis or accessory muscles use, bilateral symmetrical chest movement normal resonance on percussion, air entry is present bilaterally present, no expiratory wheezing no rhonchi no crackles. Heart - normal rate, regular rhythm, normal S1, S2, no murmurs, rubs, clicks or gallops  Abdomen - soft, nontender, nondistended, no masses or organomegaly  Neurological - alert, oriented, normal speech, no focal findings or movement disorder noted  Extremities - peripheral pulses normal, no pedal edema, no clubbing or cyanosis  Skin - normal coloration and turgor, no rashes, no suspicious skin lesions noted     Labs:  None    CBC:   WBC   Date Value Ref Range Status   05/04/2020 10.5 3.5 - 11.3 k/uL Final     Hemoglobin   Date Value Ref Range Status   05/04/2020 11.5 (L) 11.9 - 15.1 g/dL Final     Platelets   Date Value Ref Range Status   05/04/2020 260 138 - 453 k/uL Final     BMP:   Sodium   Date Value Ref Range Status   09/10/2020 138 135 - 144 mmol/L Final     Potassium   Date Value Ref Range Status   09/10/2020 4.9 3.7 - 5.3 mmol/L Final     Comment:     SPECIMEN SLIGHTLY HEMOLYZED, RESULTS MAY BE ADVERSELY AFFECTED.      Chloride   Date Value Ref Range Status   09/10/2020 100 98 - 107 mmol/L Final     CO2   Date Value Ref Range Status   09/10/2020 20 20 - 31 mmol/L Final     BUN   Date Value Ref Range Status 09/10/2020 25 (H) 8 - 23 mg/dL Final     CREATININE   Date Value Ref Range Status   09/10/2020 1.21 (H) 0.50 - 0.90 mg/dL Final   05/04/2020 1.24 (H) 0.50 - 0.90 mg/dL Final   02/07/2019 1.23 (H) 0.50 - 0.90 mg/dL Final     Glucose   Date Value Ref Range Status   09/10/2020 200 (H) 70 - 99 mg/dL Final   09/30/2011 85 74 - 106 mg/dL Final     S. Calcium:   Calcium   Date Value Ref Range Status   09/10/2020 10.4 8.6 - 10.4 mg/dL Final     S. Ionized Calcium: No results found for: IONCA  S. Magnesium: No results found for: MG  S. Phosphorus:   Phosphorus   Date Value Ref Range Status   01/28/2014 2.7 2.5 - 4.5 mg/dL Final     S. Glucose:   POC Glucose   Date Value Ref Range Status   01/27/2014 153 (H) 65 - 105 mg/dL Final   01/27/2014 112 (H) 65 - 105 mg/dL Final   01/27/2014 116 (H) 65 - 105 mg/dL Final     Glycosylated hemoglobin A1C:   Hemoglobin A1C   Date Value Ref Range Status   04/08/2021 6.4 % Final       Pulmonary Functions Testing Results:    12/3/2019: FEV1 1.50 82%, FVC 2.01 87%, FEV1 FVC 94%, TLC 4.06 103%, DLCO 11.93 74%,  3/19/2014: FEV1 1.63  96%, FVC 2.16  89%, VHJ1NOR 107 %, TLC 4.34  108% , DLCO 16.49 98%    Blood Gases: No results found for: PH, PCO2, PO2, HCO3, O2SAT    Radiological reports:    CXR  11/27/19  normal chest X-ray in 2014    CT Scans        ECHO:     Immunization History   Administered Date(s) Administered    COVID-19, Pfizer, PF, 30mcg/0.3mL 01/27/2021, 02/17/2021    Influenza, Quadv, IM, (6 mo and older Fluzone, Flulaval, Fluarix and 3 yrs and older Afluria) 09/20/2017, 09/26/2018, 11/07/2019    Influenza, Triv, 3 Years and older, IM (Afluria (5 yrs and older) 09/14/2016    Pneumococcal Conjugate 13-valent ([id number]) 10/26/2016    Pneumococcal Polysaccharide ([id number]) 10/08/2008, 03/27/2017    Tetanus Toxoid, absorbed 11/11/2004       Assessment:      1. Obstructive sleep apnea   2. Mild persistent asthma without complication   3. Dyspnea on exertion   4.  Allergic rhinitis, unspecified seasonality, unspecified trigger     She has history of chronic dyspnea on exertion her pulmonary function test almost 1.5 years ago in December 2019 showed normal FEV1 and FVC, normal lung volume may have mild increase in residual volume and shows mild reduction diffusion capacity, she did have history of a smoking for more than 30 years and likely early emphysema or pulmonary hypertension could be the cause of mild reduction diffusion capacity which was normal in 2014, her last chest x-ray on 11/27/2019 did not show any acute or chronic changes, she had stop smoking more than 22 years ago. As pulmonary function test shows only mild reduction diffusion capacity with normal spirometry and lung volumes her dyspnea on exertion is more likely related to deconditioning and encourage exercise increase activity. Plan:    Pulmonary function test before next visit  Encourage increase activity and exercise and weight loss   Continue Flovent at current [id number] [id number] 2 puff twice a day  Albuterol to be used as needed  Continue Flonase nasal spray  Claritin as needed    Flu vaccine annually. She had Prevnar 13 and up to date with Pneumovax from pulm perspective  She had Covid vaccine both the doses. Maintain an active lifestyle    Wt loss is recommended and discussed  Follow good sleep hygeine instructions  Not compliant with cpap and does not have cpap any more and and not interested in using CPAP    RTC in 6 months      Diane Mistry MD, MD             5/21/2021, 3:48 PM     Please note that this chart was generated using voice recognition Dragon dictation software. Although every effort was made to ensure the accuracy of this automated transcription, some errors in transcription may have occurred.

## 2021-05-28 RX ORDER — VALSARTAN AND HYDROCHLOROTHIAZIDE 160; 12.5 MG/1; MG/1
TABLET, FILM COATED ORAL
Qty: 90 TABLET | Refills: 0 | Status: SHIPPED | OUTPATIENT
Start: 2021-05-28 | End: 2021-09-07

## 2021-05-28 NOTE — TELEPHONE ENCOUNTER
Refill request for valsartan-hydroCHLOROthiazide (DIOVAN-HCT) 160-12.5 MG per tablet. If appropriate please send medication(s) to patients pharmacy. Next appt: Patient is currently on wait list for provider. Last appt: 4/8/2021    Health Maintenance   Topic Date Due    Diabetic foot exam  03/19/2019    Lipid screen  05/04/2021    Diabetic retinal exam  06/11/2021    DTaP/Tdap/Td vaccine (1 - Tdap) 10/06/2021 (Originally [birth year]3/1964)    Flu vaccine (Season Ended) 10/06/2021 (Originally 9/1/2021)    Shingles Vaccine (1 of 2) 10/06/2021 (Originally 7/3/1995)    Potassium monitoring  09/10/2021    Creatinine monitoring  09/10/2021    Annual Wellness Visit (AWV)  10/07/2021    A1C test (Diabetic or Prediabetic)  04/08/2022    Colon cancer screen colonoscopy  07/14/2030    DEXA (modify frequency per FRAX score)  Completed    Pneumococcal 65+ years Vaccine  Completed    COVID-19 Vaccine  Completed    Hepatitis C screen  Completed    Hepatitis A vaccine  Aged Out    Hib vaccine  Aged Out    Meningococcal (ACWY) vaccine  Aged Out       Hemoglobin A1C (%)   Date Value   04/08/2021 6.4   05/04/2020 7.0 (H)   04/29/2019 6.7             ( goal A1C is < 7)   Microalb/Crt.  Ratio (mcg/mg creat)   Date Value   11/05/2019 13     LDL Cholesterol (mg/dL)   Date Value   05/04/2020 50       (goal LDL is <100)   AST (U/L)   Date Value   10/23/2018 27     ALT (U/L)   Date Value   10/23/2018 22     BUN (mg/dL)   Date Value   09/10/2020 25 (H)     BP Readings from Last 3 Encounters:   05/21/21 98/65   04/08/21 111/70   06/05/20 133/70          (goal 120/80)          Patient Active Problem List:     S/P total knee arthroplasty     Type 2 diabetes mellitus with diabetic nephropathy, without long-term current use of insulin (HCC)     Hypertension     Asthma     Arthritis     Acquired trigger finger     Osteoarthritis of knee     Morbid obesity with BMI of 40.0-44.9, adult ([address]     Current mild episode of major depressive

## 2021-06-09 RX ORDER — GLIMEPIRIDE 4 MG/1
TABLET ORAL
Qty: 90 TABLET | Refills: 0 | Status: SHIPPED | OUTPATIENT
Start: 2021-06-09 | End: 2021-09-24

## 2021-06-09 NOTE — TELEPHONE ENCOUNTER
Refill request for glimepiride (AMARYL) 4 MG tablet. If appropriate please send medication(s) to patients pharmacy. Next appt: Patient is currently on wait list for provider. Last appt: 4/8/2021    Health Maintenance   Topic Date Due    Diabetic foot exam  03/19/2019    Lipid screen  05/04/2021    Diabetic retinal exam  06/11/2021    DTaP/Tdap/Td vaccine (1 - Tdap) 10/06/2021 (Originally 7/3/1964)    Flu vaccine (Season Ended) 10/06/2021 (Originally 9/1/2021)    Shingles Vaccine (1 of 2) 10/06/2021 (Originally 7/3/1995)    Potassium monitoring  09/10/2021    Creatinine monitoring  09/10/2021    Annual Wellness Visit (AWV)  10/07/2021    A1C test (Diabetic or Prediabetic)  04/08/2022    Colon cancer screen colonoscopy  07/14/2030    DEXA (modify frequency per FRAX score)  Completed    Pneumococcal 65+ years Vaccine  Completed    COVID-19 Vaccine  Completed    Hepatitis C screen  Completed    Hepatitis A vaccine  Aged Out    Hib vaccine  Aged Out    Meningococcal (ACWY) vaccine  Aged Out       Hemoglobin A1C (%)   Date Value   04/08/2021 6.4   05/04/2020 7.0 (H)   04/29/2019 6.7             ( goal A1C is < 7)   Microalb/Crt.  Ratio (mcg/mg creat)   Date Value   11/05/2019 13     LDL Cholesterol (mg/dL)   Date Value   05/04/2020 50       (goal LDL is <100)   AST (U/L)   Date Value   10/23/2018 27     ALT (U/L)   Date Value   10/23/2018 22     BUN (mg/dL)   Date Value   09/10/2020 25 (H)     BP Readings from Last 3 Encounters:   05/21/21 98/65   04/08/21 111/70   06/05/20 133/70          (goal 120/80)          Patient Active Problem List:     S/P total knee arthroplasty     Type 2 diabetes mellitus with diabetic nephropathy, without long-term current use of insulin (HCC)     Hypertension     Asthma     Arthritis     Acquired trigger finger     Osteoarthritis of knee     Morbid obesity with BMI of 40.0-44.9, adult ([facility] [address]     Current mild episode of major depressive disorder without prior episode [facility]     Bilateral carotid artery stenosis     Other specified glaucoma

## 2021-07-01 ENCOUNTER — HOSPITAL ENCOUNTER (OUTPATIENT)
Age: 76
Setting detail: OUTPATIENT SURGERY
Discharge: HOME | End: 2021-07-01
Attending: INTERNAL MEDICINE | Admitting: INTERNAL MEDICINE
Payer: MEDICARE

## 2021-07-01 ENCOUNTER — ANESTHESIA (OUTPATIENT)
Dept: ENDOSCOPY | Age: 76
End: 2021-07-01
Payer: MEDICARE

## 2021-07-01 ENCOUNTER — ANESTHESIA EVENT (OUTPATIENT)
Dept: ENDOSCOPY | Age: 76
End: 2021-07-01
Payer: MEDICARE

## 2021-07-01 VITALS
RESPIRATION RATE: 12 BRPM | HEART RATE: 92 BPM | WEIGHT: 188 LBS | OXYGEN SATURATION: 95 % | HEIGHT: 60 IN | BODY MASS INDEX: 36.91 KG/M2 | DIASTOLIC BLOOD PRESSURE: 63 MMHG | SYSTOLIC BLOOD PRESSURE: 112 MMHG | TEMPERATURE: 98.7 F

## 2021-07-01 VITALS
RESPIRATION RATE: 16 BRPM | OXYGEN SATURATION: 100 % | SYSTOLIC BLOOD PRESSURE: 105 MMHG | DIASTOLIC BLOOD PRESSURE: 60 MMHG

## 2021-07-01 PROCEDURE — 88305 TISSUE EXAM BY PATHOLOGIST: CPT

## 2021-07-01 PROCEDURE — 6360000002 HC RX W HCPCS: Performed by: NURSE ANESTHETIST, CERTIFIED REGISTERED

## 2021-07-01 PROCEDURE — 7100000011 HC PHASE II RECOVERY - ADDTL 15 MIN: Performed by: INTERNAL MEDICINE

## 2021-07-01 PROCEDURE — 3700000000 HC ANESTHESIA ATTENDED CARE: Performed by: INTERNAL MEDICINE

## 2021-07-01 PROCEDURE — 6370000000 HC RX 637 (ALT 250 FOR IP): Performed by: ANESTHESIOLOGY

## 2021-07-01 PROCEDURE — 3609010300 HC COLONOSCOPY W/BIOPSY SINGLE/MULTIPLE: Performed by: INTERNAL MEDICINE

## 2021-07-01 PROCEDURE — 3609010200 HC COLONOSCOPY ABLATION TUMOR POLYP/OTHER LES: Performed by: INTERNAL MEDICINE

## 2021-07-01 PROCEDURE — 3609010400 HC COLONOSCOPY POLYPECTOMY HOT BIOPSY: Performed by: INTERNAL MEDICINE

## 2021-07-01 PROCEDURE — 2709999900 HC NON-CHARGEABLE SUPPLY: Performed by: INTERNAL MEDICINE

## 2021-07-01 PROCEDURE — 2580000003 HC RX 258: Performed by: INTERNAL MEDICINE

## 2021-07-01 PROCEDURE — 7100000010 HC PHASE II RECOVERY - FIRST 15 MIN: Performed by: INTERNAL MEDICINE

## 2021-07-01 PROCEDURE — 3700000001 HC ADD 15 MINUTES (ANESTHESIA): Performed by: INTERNAL MEDICINE

## 2021-07-01 PROCEDURE — 2720000010 HC SURG SUPPLY STERILE: Performed by: INTERNAL MEDICINE

## 2021-07-01 RX ORDER — PROPOFOL 10 MG/ML
INJECTION, EMULSION INTRAVENOUS PRN
Status: DISCONTINUED | OUTPATIENT
Start: 2021-07-01 | End: 2021-07-01 | Stop reason: SDUPTHER

## 2021-07-01 RX ORDER — PROPOFOL 10 MG/ML
INJECTION, EMULSION INTRAVENOUS CONTINUOUS PRN
Status: DISCONTINUED | OUTPATIENT
Start: 2021-07-01 | End: 2021-07-01 | Stop reason: SDUPTHER

## 2021-07-01 RX ORDER — SODIUM CHLORIDE 9 MG/ML
INJECTION, SOLUTION INTRAVENOUS CONTINUOUS
Status: DISCONTINUED | OUTPATIENT
Start: 2021-07-01 | End: 2021-07-01 | Stop reason: HOSPADM

## 2021-07-01 RX ORDER — DICLOFENAC SODIUM 1 MG/ML
3 SOLUTION/ DROPS OPHTHALMIC 2 TIMES DAILY PRN
Status: COMPLETED | OUTPATIENT
Start: 2021-07-01 | End: 2021-07-01

## 2021-07-01 RX ADMIN — DICLOFENAC SODIUM 3 DROP: 1 SOLUTION OPHTHALMIC at 11:49

## 2021-07-01 RX ADMIN — PROPOFOL INJECTABLE EMULSION 1300 MG: 10 INJECTION, EMULSION INTRAVENOUS at 10:05

## 2021-07-01 RX ADMIN — DICLOFENAC SODIUM 3 DROP: 1 SOLUTION OPHTHALMIC at 12:16

## 2021-07-01 RX ADMIN — PROPOFOL 120 MCG/KG/MIN: 10 INJECTION, EMULSION INTRAVENOUS at 10:05

## 2021-07-01 RX ADMIN — SODIUM CHLORIDE: 9 INJECTION, SOLUTION INTRAVENOUS at 09:55

## 2021-07-01 ASSESSMENT — PAIN - FUNCTIONAL ASSESSMENT: PAIN_FUNCTIONAL_ASSESSMENT: 0-10

## 2021-07-01 ASSESSMENT — ENCOUNTER SYMPTOMS: SHORTNESS OF BREATH: 1

## 2021-07-01 ASSESSMENT — PAIN SCALES - GENERAL
PAINLEVEL_OUTOF10: 0

## 2021-07-01 NOTE — PROGRESS NOTES
Right eye reddened and watery; patient complains that her eye itches and its watery. .  Dr. Ramya Patton (anesthesia) notified. Orders received and carried out.

## 2021-07-01 NOTE — H&P
History and Physical    Pt Name: Ander Singh  MRN: [id number]  YOB: 1945  Date of evaluation: 7/1/2021  Primary Care Physician: Fernando Hebert MD    SUBJECTIVE:   History of Chief Complaint:    Kallie Collado is a 76 y.o. female who is scheduled today for COLONOSCOPY WITH EMR. Patient states she has had a known colon polyp for about 10-15 years. She denies any rectal bleeding, abnormal stools, N/V, or abdominal pain. is allergic to aspirin. Medications  Prior to Admission medications    Medication Sig Start Date End Date Taking?  Authorizing Provider   glimepiride (AMARYL) 4 MG tablet TAKE ONE TABLET BY MOUTH EVERY MORNING BEFORE BREAKFAST 6/9/21  Yes Fernando Hebert MD   valsartan-hydroCHLOROthiazide (DIOVAN-HCT) 160-12.5 MG per tablet TAKE ONE TABLET BY MOUTH DAILY 5/28/21  Yes Fernando Hebert MD   citalopram (CELEXA) 40 MG tablet TAKE ONE TABLET BY MOUTH DAILY 12/3/20  Yes Fernando Hebert MD   metFORMIN (GLUCOPHAGE) 1000 MG tablet TAKE ONE TABLET BY MOUTH TWICE A DAY WITH MEALS 9/17/20  Yes Fernando Hebert MD   Latanoprost 0.005 % EMUL latanoprost 0.005 % eye drops   Yes Historical Provider, MD   simvastatin (ZOCOR) 40 MG tablet Take 1 tablet by mouth nightly 5/7/20  Yes Fernando Hebert MD   fluticasone (FLONASE) 50 MCG/ACT nasal spray 2 sprays by Nasal route daily 12/3/19  Yes Taiwo Menon MD   albuterol sulfate  (90 Base) MCG/ACT inhaler Inhale 2 puffs into the lungs every 6 hours as needed for Wheezing 12/3/19  Yes Taiwo Menon MD   fluticasone (FLOVENT HFA) 110 MCG/ACT inhaler Inhale 2 puffs into the lungs 2 times daily 3/27/19  Yes Taiwo Menon MD   acetaminophen (TYLENOL) 500 MG tablet Take 500 mg by mouth   Yes Historical Provider, MD   aspirin 81 MG tablet Take 81 mg by mouth daily   Yes Historical Provider, MD   loratadine (CLARITIN) 10 MG tablet Take 1 tablet by mouth daily as needed (allergies) 5/21/21   Taiwo Menon MD   Handicap Placard MISC by Does not apply route Dx: Osteoarthritis, asthma  Start 2021- 26   Joel Oquendo MD     Past Medical History    has a past medical history of Acquired trigger finger, ROSELIA (acute kidney injury) ([facility] [address] Allergic rhinitis, Anesthesia complication, Anxiety, Arthritis, Asthma, Carotid stenosis, asymptomatic, CKD (chronic kidney disease) stage 3, GFR 30-59 ml/min ([facility] [address] Diabetes mellitus ([facility] [address] Dry skin, Dyspnea, Frequency of urination, History of bronchitis, Hyperkalemia, Hypertension, Knee pain, left, Obesity, Obsessive compulsive disorder, Osteoarthritis of knee, Post-nasal drip, S/P total knee arthroplasty, Sleep apnea, Urgency incontinence, Urinary, incontinence, stress female, Wears glasses, and Wears partial dentures. Past Surgical History   has a past surgical history that includes joint replacement; back surgery; lumbar discectomy; Carpal tunnel release (Bilateral); Dilation and curettage of uterus; Tonsillectomy; eye surgery; Cataract removal with implant (Bilateral); Total knee arthroplasty (Right); Total knee arthroplasty; and Total knee arthroplasty (Left, 2014). Social History   reports that she quit smoking about 23 years ago. Her smoking use included cigarettes. She smoked 1.00 pack per day. She has never used smokeless tobacco.   reports current alcohol use. reports no history of drug use. Marital Status   Family History  Family Status   Relation Name Status    Mother      Father      Brother  Alive    MGM      MGF      [address]      PGF      Other  (Not Specified)     family history includes Colon Cancer in an other family member; Coronary Art Dis in an other family member; Heart Disease in her brother and mother; Stroke in an other family member. OBJECTIVE:   VITALS:  height is 5' (1.524 m) and weight is 188 lb (85.3 kg). Her infrared temperature is 97.3 °F (36.3 °C). Her blood pressure is 96/61 and her pulse is 113.  Her respiration is 16 and oxygen

## 2021-07-01 NOTE — OP NOTE
[address] [facility]        COLONOSCOPY    Patient:   Kallie Collado    :    1945    Referring/PCP: Fernando Hebert MD  Facility:  [facility]  Procedure:   Colonoscopy with EMR of Cecum polyp, APC , 6 hemostatic clips applied. Cold snare and cold forcep's polypectomies done   Date:     2021  Endoscopist:  Huan Renee MD    Indication:  previous adenomatous polyp. Anesthesia: MAC    Complications:  None      Estimated blood loss: Minimal    Specimen collected: Yes      Description of Procedure:  Informed consent was obtained from the patient after explanation of the procedure including indications, description of the procedure,  benefits and possible risks and complications of the procedure, and alternatives. Questions were answered. The patient's history was reviewed and a directed physical examination was performed prior to the procedure. Patient was monitored throughout the procedure with pulse oximetry and periodic assessment of vital signs. Patient was sedated as noted above. With the patient initially in the left lateral decubitus position, a digital rectal examination was performed and revealed negative without mass, lesions or tenderness. The Olympus video colonoscope was placed in the patient's rectum and advanced without difficulty  to the cecum, which was identified by the ileocecal valve and appendiceal orifice. The prep was good. Examination of the mucosa was performed during both introduction and withdrawal of the colonoscope. Retroflexed view of the rectum was performed. The patient  was taken to the recovery area in good condition. Findings:     1. 3 cm laterally spreading carpet like polyp was seen in the cecum. EMR was done . Polyp lifted with Orise. Post lifting the polyp was removed in a piecemeal fashion using hot snare cautery.  Post removal residual polyps at the margins were removed with hot forcep's polypectomy. Post removal the margins of the polypectomy site was treated with APC settings 0.5 Flow and 50 gould. Post APC the defect was closed using 6 hemostatic clips. No bleeding at the end of the procedure. 2. 6 mm sessile polyp seen in the descending colon. Cold snare polypectomy done   3. 5 mm sessile polyp seen in the Rectum. Cold forep's polypectomy done   4. Internal hemorrhoids seen during retroflexion view      Recommendations:   Follow with the biopsy results                                     Repeat colon in 6 months                                     Follow up with PCP    Latrice Kapoor MD

## 2021-07-01 NOTE — ANESTHESIA PRE PROCEDURE
Department of Anesthesiology  Preprocedure Note       Name:  Cinda Corcoran   Age:  76 y.o.  :  1945                                          MRN:  [id number]         Date:  2021      Surgeon: Kristi Grant):  Sloan Luis MD    Procedure: Procedure(s):  COLONOSCOPY WITH EMR    Medications prior to admission:   Prior to Admission medications    Medication Sig Start Date End Date Taking?  Authorizing Provider   glimepiride (AMARYL) 4 MG tablet TAKE ONE TABLET BY MOUTH EVERY MORNING BEFORE BREAKFAST 21  Yes Ike Flynn MD   valsartan-hydroCHLOROthiazide (DIOVAN-HCT) 160-12.5 MG per tablet TAKE ONE TABLET BY MOUTH DAILY 21  Yes Ike Flynn MD   citalopram (CELEXA) 40 MG tablet TAKE ONE TABLET BY MOUTH DAILY 12/3/20  Yes Ike Flynn MD   metFORMIN (GLUCOPHAGE) 1000 MG tablet TAKE ONE TABLET BY MOUTH TWICE A DAY WITH MEALS 20  Yes Ike Flynn MD   Latanoprost 0.005 % EMUL latanoprost 0.005 % eye drops   Yes Historical Provider, MD   simvastatin (ZOCOR) 40 MG tablet Take 1 tablet by mouth nightly 20  Yes Ike Flynn MD   fluticasone (FLONASE) 50 MCG/ACT nasal spray 2 sprays by Nasal route daily 12/3/19  Yes Jene Simmonds, MD   albuterol sulfate  (90 Base) MCG/ACT inhaler Inhale 2 puffs into the lungs every 6 hours as needed for Wheezing 12/3/19  Yes Jene Simmonds, MD   fluticasone (FLOVENT HFA) 110 MCG/ACT inhaler Inhale 2 puffs into the lungs 2 times daily 3/27/19  Yes Jene Simmonds, MD   acetaminophen (TYLENOL) 500 MG tablet Take 500 mg by mouth   Yes Historical Provider, MD   aspirin 81 MG tablet Take 81 mg by mouth daily   Yes Historical Provider, MD   loratadine (CLARITIN) 10 MG tablet Take 1 tablet by mouth daily as needed (allergies) 21   Jene Simmonds, MD   Handicap Placard MISC by Does not apply route Dx: Osteoarthritis, asthma  Start 2021- 26   Ike Flynn MD       Current medications:    Current Facility-Administered Medications   Medication Dose Route Frequency Provider Last Rate Last Admin    0.9 % sodium chloride infusion   Intravenous Continuous Jeevan Rapp MD           Allergies:     Allergies   Allergen Reactions    Aspirin Other (See Comments)       Problem List:    Patient Active Problem List   Diagnosis Code    S/P total knee arthroplasty Z96.659    Type 2 diabetes mellitus with diabetic nephropathy, without long-term current use of insulin ([facility]) E11.21    Hypertension I10    Asthma J45.909    Arthritis M19.90    Acquired trigger finger M65.30    Osteoarthritis of knee M17.10    Morbid obesity with BMI of 40.0-44.9, adult ([facility]) E66.01, Z68.41    Current mild episode of major depressive disorder without prior episode ([facility] [address] F32.0    Bilateral carotid artery stenosis I65.23    Other specified glaucoma H40.89       Past Medical History:        Diagnosis Date    Acquired trigger finger 4/27/2018    ROSELIA (acute kidney injury) ([facility] [address] 2011    Allergic rhinitis     Anesthesia complication     after or bp dropped kidney labs elevated for few years    Anxiety     Arthritis     Asthma     Carotid stenosis, asymptomatic     16-49%    CKD (chronic kidney disease) stage 3, GFR 30-59 ml/min ([facility])     Diabetes mellitus ([facility] [address]     Dry skin     Dyspnea     Frequency of urination     History of bronchitis     Hyperkalemia 1/24/2014    Hypertension     Knee pain, left     Obesity     Obsessive compulsive disorder     Osteoarthritis of knee 4/27/2018    Post-nasal drip     S/P total knee arthroplasty 1/24/2014    Sleep apnea     Urgency incontinence     Urinary, incontinence, stress female     Wears glasses     Wears partial dentures     upper       Past Surgical History:        Procedure Laterality Date    BACK SURGERY      CARPAL TUNNEL RELEASE Bilateral     10 yrs ago    CATARACT REMOVAL WITH IMPLANT Bilateral     DILATION AND CURETTAGE OF UTERUS      EYE SURGERY      JOINT REPLACEMENT      LUMBAR DISCECTOMY      TONSILLECTOMY      TOTAL KNEE ARTHROPLASTY Right     TOTAL KNEE ARTHROPLASTY      TOTAL KNEE ARTHROPLASTY Left 2014       Social History:    Social History     Tobacco Use    Smoking status: Former Smoker     Packs/day: 1.00     Types: Cigarettes     Quit date: 1998     Years since quittin.5    Smokeless tobacco: Never Used   Substance Use Topics    Alcohol use: Yes     Comment: occas                                Counseling given: Not Answered      Vital Signs (Current):   Vitals:    21 0929   BP: 96/61   Pulse: 113   Resp: 16   Temp: 97.3 °F (36.3 °C)   TempSrc: Infrared   SpO2: 95%   Weight: 188 lb (85.3 kg)   Height: 5' (1.524 m)                                              BP Readings from Last 3 Encounters:   21 96/61   21 98/65   21 111/70       NPO Status: Time of last liquid consumption: 2100                        Time of last solid consumption: 1500                                                      BMI:   Wt Readings from Last 3 Encounters:   21 188 lb (85.3 kg)   21 193 lb (87.5 kg)   21 193 lb 9.6 oz (87.8 kg)     Body mass index is 36.72 kg/m². CBC:   Lab Results   Component Value Date    WBC 10.5 2020    RBC 4.04 2020    HGB 11.5 2020    HCT 37.7 2020    MCV 93.3 2020    RDW 13.4 2020     2020       CMP:   Lab Results   Component Value Date     09/10/2020    K 4.9 09/10/2020     09/10/2020    CO2 20 09/10/2020    BUN 25 09/10/2020    CREATININE 1.21 09/10/2020    GFRAA 53 09/10/2020    LABGLOM 43 09/10/2020    GLUCOSE 200 09/10/2020    GLUCOSE 85 2011    PROT 7.5 10/23/2018    CALCIUM 10.4 09/10/2020    BILITOT 0.45 10/23/2018    ALKPHOS 55 10/23/2018    AST 27 10/23/2018    ALT 22 10/23/2018       POC Tests: No results for input(s): POCGLU, POCNA, POCK, POCCL, POCBUN, POCHEMO, POCHCT in the last 72 hours.     Coags: No results found for: PROTIME, INR, APTT    HCG (If Applicable): No results found for: PREGTESTUR, PREGSERUM, HCG, HCGQUANT     ABGs: No results found for: PHART, PO2ART, NHO1RTG, ELP5LGG, BEART, H7DTVTOY     Type & Screen (If Applicable):  No results found for: LABABO, LABRH    Drug/Infectious Status (If Applicable):  Lab Results   Component Value Date    HEPCAB NONREACTIVE 11/05/2019       COVID-19 Screening (If Applicable): No results found for: COVID19        Anesthesia Evaluation  Patient summary reviewed no history of anesthetic complications:   Airway: Mallampati: III  TM distance: >3 FB   Neck ROM: full  Mouth opening: > = 3 FB Dental:    (+) upper dentures      Pulmonary:normal exam    (+) shortness of breath: no interval change,  sleep apnea: on CPAP,  asthma: seasonal asthma,                            Cardiovascular:    (+) hypertension: no interval change,       ECG reviewed  Rhythm: regular  Rate: normal                    Neuro/Psych:   (+) psychiatric history:depression/anxiety             GI/Hepatic/Renal: Neg GI/Hepatic/Renal ROS            Endo/Other:    (+) DiabetesType II DM, no interval change, , .                 Abdominal:   (+) obese,           Vascular: negative vascular ROS. Other Findings:           Anesthesia Plan      MAC     ASA 3       Induction: intravenous. Anesthetic plan and risks discussed with patient. Plan discussed with CRNA.                   Zayra Lowe MD   7/1/2021

## 2021-07-01 NOTE — ANESTHESIA POSTPROCEDURE EVALUATION
Department of Anesthesiology  Postprocedure Note    Patient: Nereida Marroquin  MRN: [id number]  YOB: 1945  Date of evaluation: 7/1/2021  Time:  11:19 AM     Procedure Summary     Date: 07/01/21 Room / Location: [address]    Anesthesia Start: 1000 Anesthesia Stop: 1106    Procedures:       COLONOSCOPY POLYPECTOMY HOT BIOPSY      COLONOSCOPY W/ ENDOSCOPIC MUCOSAL RESECTION      COLONOSCOPY WITH BIOPSY Diagnosis: (COLON POLYPS)    Surgeons: Ralph Crawford MD Responsible Provider: Arian Martines MD    Anesthesia Type: MAC ASA Status: 3          Anesthesia Type: MAC    Katina Phase I: Katina Score: 10    Katina Phase II: Katina Score: 9    Last vitals: Reviewed and per EMR flowsheets.        Anesthesia Post Evaluation    Patient location during evaluation: PACU  Patient participation: complete - patient participated  Level of consciousness: awake and alert  Pain score: 0  Airway patency: patent  Nausea & Vomiting: no vomiting and no nausea  Complications: no  Cardiovascular status: hemodynamically stable  Respiratory status: acceptable  Hydration status: stable

## 2021-07-02 LAB — SURGICAL PATHOLOGY REPORT: NORMAL

## 2021-07-08 NOTE — TELEPHONE ENCOUNTER
E-scribe request from [facility] for Simvastatin 40 mg. Patient is currently on the waitlist for an appt. Health Maintenance   Topic Date Due    Lipid screen  05/04/2021    DTaP/Tdap/Td vaccine (1 - Tdap) 10/06/2021 (Originally 7/3/1964)    Shingles Vaccine (1 of 2) 10/06/2021 (Originally 7/3/1995)    Flu vaccine (1) 09/01/2021    Potassium monitoring  09/10/2021    Creatinine monitoring  09/10/2021    Annual Wellness Visit (AWV)  10/07/2021    DEXA (modify frequency per FRAX score)  Completed    Pneumococcal 65+ years Vaccine  Completed    COVID-19 Vaccine  Completed    Hepatitis C screen  Completed    Hepatitis A vaccine  Aged Out    Hib vaccine  Aged Out    Meningococcal (ACWY) vaccine  Aged Out             (applicable per patient's age: Cancer Screenings, Depression Screening, Fall Risk Screening, Immunizations)    Hemoglobin A1C (%)   Date Value   04/08/2021 6.4   05/04/2020 7.0 (H)   04/29/2019 6.7     Microalb/Crt.  Ratio (mcg/mg creat)   Date Value   11/05/2019 13     LDL Cholesterol (mg/dL)   Date Value   05/04/2020 50     AST (U/L)   Date Value   10/23/2018 27     ALT (U/L)   Date Value   10/23/2018 22     BUN (mg/dL)   Date Value   09/10/2020 25 (H)      (goal A1C is < 7)   (goal LDL is <100) need 30-50% reduction from baseline     BP Readings from Last 3 Encounters:   07/01/21 112/63   07/01/21 105/60   05/21/21 98/65    (goal /80)      All Future Testing planned in [facility]:  Lab Frequency Next Occurrence   Full PFT Study With Bronchodilator Once 11/03/2021       Next Visit Date:  Future Appointments   Date Time Provider Eulalia Zuleta   12/7/2021 10:00 AM Sam Armenta MD Resp [facility]            Patient Active Problem List:     S/P total knee arthroplasty     Type 2 diabetes mellitus with diabetic nephropathy, without long-term current use of insulin ([address]     Hypertension     Asthma     Arthritis     Acquired trigger finger     Osteoarthritis of knee     Morbid obesity with BMI of 40.0-44.9, adult (HCC)     Current mild episode of major depressive disorder without prior episode ([facility] [address]     Bilateral carotid artery stenosis     Other specified glaucoma

## 2021-07-13 RX ORDER — SIMVASTATIN 40 MG
TABLET ORAL
Qty: 90 TABLET | Refills: 2 | Status: SHIPPED | OUTPATIENT
Start: 2021-07-13 | End: 2022-07-05

## 2021-07-22 ENCOUNTER — TELEPHONE (OUTPATIENT)
Dept: INTERNAL MEDICINE | Age: 76
End: 2021-07-22

## 2021-07-22 DIAGNOSIS — Z12.31 ENCOUNTER FOR SCREENING MAMMOGRAM FOR MALIGNANT NEOPLASM OF BREAST: Primary | ICD-10-CM

## 2021-07-22 NOTE — TELEPHONE ENCOUNTER
Patient called and said she gets a mammogram every year. She would like to know if Dang Parkinson can order her yearly mammogram so she can get it done. Order is pended please sign if this is the correct order. She is also requesting her results from her ECHO done on 5/20/21.

## 2021-07-23 NOTE — TELEPHONE ENCOUNTER
Phone call to the patient, she was given the results of the Echo and the order for the mammogram was mailed to her.

## 2021-09-07 RX ORDER — VALSARTAN AND HYDROCHLOROTHIAZIDE 160; 12.5 MG/1; MG/1
TABLET, FILM COATED ORAL
Qty: 90 TABLET | Refills: 0 | Status: SHIPPED | OUTPATIENT
Start: 2021-09-07 | End: 2022-01-04

## 2021-09-07 NOTE — TELEPHONE ENCOUNTER
E-scribe request from [address] [place] [address]  for Valsartan-Hydrochlorothiazide 160-12.5 mg. Patient is on the waitlist for an appt. Health Maintenance   Topic Date Due    Lipid screen  05/04/2021    Flu vaccine (1) 09/01/2021    Potassium monitoring  09/10/2021    Creatinine monitoring  09/10/2021    Annual Wellness Visit (AWV)  10/07/2021    DTaP/Tdap/Td vaccine (1 - Tdap) 10/06/2021 (Originally [birth year]/3/1964)    Shingles Vaccine (1 of 2) 10/06/2021 (Originally 7/3/1995)    DEXA (modify frequency per FRAX score)  Completed    Pneumococcal 65+ years Vaccine  Completed    COVID-19 Vaccine  Completed    Hepatitis C screen  Completed    Hepatitis A vaccine  Aged Out    Hib vaccine  Aged Out    Meningococcal (ACWY) vaccine  Aged Out             (applicable per patient's age: Cancer Screenings, Depression Screening, Fall Risk Screening, Immunizations)    Hemoglobin A1C (%)   Date Value   04/08/2021 6.4   05/04/2020 7.0 (H)   04/29/2019 6.7     Microalb/Crt.  Ratio (mcg/mg creat)   Date Value   11/05/2019 13     LDL Cholesterol (mg/dL)   Date Value   05/04/2020 50     AST (U/L)   Date Value   10/23/2018 27     ALT (U/L)   Date Value   10/23/2018 22     BUN (mg/dL)   Date Value   09/10/2020 25 (H)      (goal A1C is < 7)   (goal LDL is <100) need 30-50% reduction from baseline     BP Readings from Last 3 Encounters:   07/01/21 112/63   07/01/21 105/60   05/21/21 98/65    (goal /80)      All Future Testing planned in [facility]:  Lab Frequency Next Occurrence   Full PFT Study With Bronchodilator Once 11/03/2021   HECTOR DIGITAL SCREEN W OR WO CAD BILATERAL Once 11/06/2021       Next Visit Date:  Future Appointments   Date Time Provider Eulalia Zuleta   9/11/2021  1:30 PM ST[facility] MAMMO RM 1 [facility]MO [facility]   12/7/2021 10:00 AM Axel Lobato MD Resp Spec [id number]            Patient Active Problem List:     S/P total knee arthroplasty     Type 2 diabetes mellitus with diabetic nephropathy, without long-term current use of insulin ([facility] [address]     Hypertension     Asthma     Arthritis     Acquired trigger finger     Osteoarthritis of knee     Morbid obesity with BMI of 40.0-44.9, adult ([facility])     Current mild episode of major depressive disorder without prior episode ([facility] [address]     Bilateral carotid artery stenosis     Other specified glaucoma

## 2021-09-11 ENCOUNTER — HOSPITAL ENCOUNTER (OUTPATIENT)
Dept: MAMMOGRAPHY | Age: 76
Discharge: HOME | End: 2021-09-13
Payer: MEDICARE

## 2021-09-11 DIAGNOSIS — Z12.31 ENCOUNTER FOR SCREENING MAMMOGRAM FOR MALIGNANT NEOPLASM OF BREAST: ICD-10-CM

## 2021-09-11 PROCEDURE — 77063 BREAST TOMOSYNTHESIS BI: CPT

## 2021-09-23 NOTE — TELEPHONE ENCOUNTER
E-scribe request from [facility] for Glimepiride 4 mg. Patient has an appt 10/21/21. Health Maintenance   Topic Date Due    Lipid screen  05/04/2021    Flu vaccine (1) 09/01/2021    Potassium monitoring  09/10/2021    Creatinine monitoring  09/10/2021    Annual Wellness Visit (AWV)  10/07/2021    DTaP/Tdap/Td vaccine (1 - Tdap) 10/06/2021 (Originally 7/3/1964)    Shingles Vaccine (1 of 2) 10/06/2021 (Originally 7/3/1995)    DEXA (modify frequency per FRAX score)  Completed    Pneumococcal 65+ years Vaccine  Completed    COVID-19 Vaccine  Completed    Hepatitis C screen  Completed    Hepatitis A vaccine  Aged Out    Hib vaccine  Aged Out    Meningococcal (ACWY) vaccine  Aged Out             (applicable per patient's age: Cancer Screenings, Depression Screening, Fall Risk Screening, Immunizations)    Hemoglobin A1C (%)   Date Value   04/08/2021 6.4   05/04/2020 7.0 (H)   04/29/2019 6.7     Microalb/Crt.  Ratio (mcg/mg creat)   Date Value   11/05/2019 13     LDL Cholesterol (mg/dL)   Date Value   05/04/2020 50     AST (U/L)   Date Value   10/23/2018 27     ALT (U/L)   Date Value   10/23/2018 22     BUN (mg/dL)   Date Value   09/10/2020 25 (H)      (goal A1C is < 7)   (goal LDL is <100) need 30-50% reduction from baseline     BP Readings from Last 3 Encounters:   07/01/21 112/63   07/01/21 105/60   05/21/21 98/65    (goal /80)      All Future Testing planned in [facility]:  Lab Frequency Next Occurrence   Full PFT Study With Bronchodilator Once 11/03/2021       Next Visit Date:  Future Appointments   Date Time Provider Eulalia Zuleta   10/21/2021  3:00 PM Yifan Tovar MD [facility] IM [facility]   12/7/2021 10:00 AM Lorraine Wisdom MD Resp Spec [facility]            Patient Active Problem List:     S/P total knee arthroplasty     Type 2 diabetes mellitus with diabetic nephropathy, without long-term current use of insulin ([facility] [address]     Hypertension     Asthma     Arthritis     Acquired trigger finger Osteoarthritis of knee     Morbid obesity with BMI of 40.0-44.9, adult (HCC)     Current mild episode of major depressive disorder without prior episode ([facility] [address]     Bilateral carotid artery stenosis     Other specified glaucoma

## 2021-09-24 RX ORDER — GLIMEPIRIDE 4 MG/1
TABLET ORAL
Qty: 90 TABLET | Refills: 0 | Status: SHIPPED | OUTPATIENT
Start: 2021-09-24 | End: 2022-06-21

## 2021-12-20 ENCOUNTER — OFFICE VISIT (OUTPATIENT)
Dept: ORTHOPEDIC SURGERY | Age: 76
End: 2021-12-20
Payer: MEDICARE

## 2021-12-20 DIAGNOSIS — M25.561 RIGHT KNEE PAIN, UNSPECIFIED CHRONICITY: Primary | ICD-10-CM

## 2021-12-20 PROCEDURE — G8399 PT W/DXA RESULTS DOCUMENT: HCPCS | Performed by: ORTHOPAEDIC SURGERY

## 2021-12-20 PROCEDURE — G8417 CALC BMI ABV UP PARAM F/U: HCPCS | Performed by: ORTHOPAEDIC SURGERY

## 2021-12-20 PROCEDURE — 1090F PRES/ABSN URINE INCON ASSESS: CPT | Performed by: ORTHOPAEDIC SURGERY

## 2021-12-20 PROCEDURE — 1123F ACP DISCUSS/DSCN MKR DOCD: CPT | Performed by: ORTHOPAEDIC SURGERY

## 2021-12-20 PROCEDURE — G8484 FLU IMMUNIZE NO ADMIN: HCPCS | Performed by: ORTHOPAEDIC SURGERY

## 2021-12-20 PROCEDURE — 4040F PNEUMOC VAC/ADMIN/RCVD: CPT | Performed by: ORTHOPAEDIC SURGERY

## 2021-12-20 PROCEDURE — 1036F TOBACCO NON-USER: CPT | Performed by: ORTHOPAEDIC SURGERY

## 2021-12-20 PROCEDURE — G8428 CUR MEDS NOT DOCUMENT: HCPCS | Performed by: ORTHOPAEDIC SURGERY

## 2021-12-20 PROCEDURE — 99203 OFFICE O/P NEW LOW 30 MIN: CPT | Performed by: ORTHOPAEDIC SURGERY

## 2021-12-20 NOTE — PROGRESS NOTES
Pavan Boyer M.D.            [address] [facility], [address] [facility],[address], [facility].           Dept Phone: [phone]           Dept Fax:  [phone]           Estuardo Richardson          Dept Phone: [phone]           Dept Fax:  [phone]      Chief Compliant:  Chief Complaint   Patient presents with    Pain     Rt knee        History of Present Illness: This is a 68 y.o. female who presents to the clinic today for evaluation / follow up of right leg pain. Patient is a 26-year-old female who is being seen for right leg pain. Patient describes pain going from her buttock area going down the anterior lateral SPECT of her leg and down to her through her shin and not quite to her foot. She denies any injury or trauma to her back recently. She does have a history of having a microdiscectomy in the past level unknown. She did state that she had seen somebody relatively recently for pain in her hip and had x-rays at [facility] which states that her hip was normal but she had a very degenerative low back. She is present not seeing anybody for her back she is not doing any pain management. Patient has a history of having a bilateral total knee arthroplasty performed per Dr Gerard Soares, the right in 2011 the left in 2014 she has really no complaints regards to her knees. .       Review of Systems   Constitutional: Negative for fever, chills, sweats. Eyes: Negative for changes in vision, or pain. HENT: Negative for ear ache, epistaxis, or sore throat. Respiratory/Cardio: Negative for Chest pain, palpitations, SOB, or cough. Gastrointestinal: Negative for abdominal pain, N/V/D. Genitourinary: Negative for dysuria, frequency, urgency, or hematuria. Neurological: Negative for headache, numbness, or weakness.    Integumentary: Negative for rash, itching, laceration, or abrasion. Musculoskeletal: Positive for Pain (Rt knee)       Physical Exam:  Constitutional: Patient is oriented to person, place, and time. Patient appears well-developed and well nourished. HENT: Negative otherwise noted  Head: Normocephalic and Atraumatic  Nose: Normal  Eyes: Conjunctivae and EOM are normal  Neck: Normal range of motion Neck supple. Respiratory/Cardio: Effort normal. No respiratory distress. Musculoskeletal: Physical examination the patient's right knee notes that she has good motion 0 to 130 degrees with good stability with varus valgus stressing throughout her motion. She has good patellar tracking. She has no swelling. Patient has no pain whatsoever on hip rotation on flexion internal ex rotation negative FADIR negative PALLAVI negative Stinchfield's no significant trochanteric findings. Patient does have moderate pain with the straight leg raise with and exacerbated by foot dorsiflexion. She has low back tenderness as well. Neurological: Patient is alert and oriented to person, place, and time. Normal strenght. No sensory deficit. Skin: Skin is warm and dry  Psychiatric: Behavior is normal. Thought content normal.  Nursing note and vitals reviewed. Labs and Imaging:     XR taken today:  XR KNEE RIGHT (1-2 VIEWS)    Result Date: 12/20/2021  X-rays taken today reviewed by me show standing AP both knees and lateral the right knee. Patient status post bilateral total knee arthroplasties. Components appear to be in adequate position on AP and lateral views of the right knee. Left knee is notes the tibial component and some varus but overall alignment acceptable. No acute process is noted          Orders Placed This Encounter   Procedures    XR KNEE RIGHT (1-2 VIEWS)     Standing Status:   Future     Number of Occurrences:   1     Standing Expiration Date:   12/17/2022       Assessment and Plan:  1. Right knee pain, unspecified chronicity    2.       History of bilateral total knee arthroplasties done elsewhere        This is a 68 y.o. female who presents to the clinic today for evaluation / follow up of history of bilateral total knee arthroplasties. Probable degenerative disc disease lumbar spine. Past History:    Current Outpatient Medications:     glimepiride (AMARYL) 4 MG tablet, TAKE ONE TABLET BY MOUTH EVERY MORNING BEFORE BREAKFAST, Disp: 90 tablet, Rfl: 0    valsartan-hydroCHLOROthiazide (DIOVAN-HCT) 160-12.5 MG per tablet, TAKE ONE TABLET BY MOUTH DAILY, Disp: 90 tablet, Rfl: 0    simvastatin (ZOCOR) 40 MG tablet, TAKE ONE TABLET BY MOUTH ONCE NIGHTLY, Disp: 90 tablet, Rfl: 2    loratadine (CLARITIN) 10 MG tablet, Take 1 tablet by mouth daily as needed (allergies), Disp: 30 tablet, Rfl: 5    Handicap Placard MISC, by Does not apply route Dx: Osteoarthritis, asthma Start 4/8/2021- 4/7/26, Disp: 1 each, Rfl: 0    citalopram (CELEXA) 40 MG tablet, TAKE ONE TABLET BY MOUTH DAILY, Disp: 90 tablet, Rfl: 3    metFORMIN (GLUCOPHAGE) 1000 MG tablet, TAKE ONE TABLET BY MOUTH TWICE A DAY WITH MEALS, Disp: 180 tablet, Rfl: 4    Latanoprost 0.005 % EMUL, latanoprost 0.005 % eye drops, Disp: , Rfl:     fluticasone (FLONASE) 50 MCG/ACT nasal spray, 2 sprays by Nasal route daily, Disp: 1 Bottle, Rfl: 11    albuterol sulfate  (90 Base) MCG/ACT inhaler, Inhale 2 puffs into the lungs every 6 hours as needed for Wheezing, Disp: 1 Inhaler, Rfl: 11    fluticasone (FLOVENT HFA) 110 MCG/ACT inhaler, Inhale 2 puffs into the lungs 2 times daily, Disp: 1 Inhaler, Rfl: 11    acetaminophen (TYLENOL) 500 MG tablet, Take 500 mg by mouth, Disp: , Rfl:     aspirin 81 MG tablet, Take 81 mg by mouth daily, Disp: , Rfl:   Allergies   Allergen Reactions    Aspirin Other (See Comments)     Social History     Socioeconomic History    Marital status:       Spouse name: Not on file    Number of children: Not on file    Years of education: Not on file    Highest education level: Not on file   Occupational History    Not on file   Tobacco Use    Smoking status: Former Smoker     Packs/day: 1.00     Types: Cigarettes     Quit date: 1998     Years since quittin.9    Smokeless tobacco: Never Used   Vaping Use    Vaping Use: Never used   Substance and Sexual Activity    Alcohol use: Yes     Comment: occas    Drug use: No    Sexual activity: Not on file   Other Topics Concern    Not on file   Social History Narrative    Not on file     Social Determinants of Health     Financial Resource Strain:     Difficulty of Paying Living Expenses: Not on file   Food Insecurity:     Worried About [address] [employer] in the Last Year: Not on file    [place] of Food in the Last Year: Not on file   Transportation Needs:     Lack of Transportation (Medical): Not on file    Lack of Transportation (Non-Medical):  Not on file   Physical Activity:     Days of Exercise per Week: Not on file    Minutes of Exercise per Session: Not on file   Stress:     Feeling of Stress : Not on file   Social Connections:     Frequency of Communication with Friends and Family: Not on file    Frequency of Social Gatherings with Friends and Family: Not on file    Attends [employer] Services: Not on file    Active Member of [address] [place] [address] valuescope or Organizations: Not on file    Attends Club or Organization Meetings: Not on file    Marital Status: Not on file   Intimate Partner Violence:     Fear of Current or Ex-Partner: Not on file    Emotionally Abused: Not on file    Physically Abused: Not on file    Sexually Abused: Not on file   Housing Stability:     Unable to Pay for Housing in the Last Year: Not on file    Number of [place] in the Last Year: Not on file    Unstable Housing in the Last Year: Not on file     Past Medical History:   Diagnosis Date    Acquired trigger finger 2018    ROSELIA (acute kidney injury) ([facility]     Allergic rhinitis     Anesthesia complication     after or bp dropped kidney labs elevated for few years    Anxiety     Arthritis     Asthma     Carotid stenosis, asymptomatic     16-49%    CKD (chronic kidney disease) stage 3, GFR 30-59 ml/min ([facility])     Diabetes mellitus (HCC)     Dry skin     Dyspnea     Frequency of urination     History of bronchitis     Hyperkalemia 1/24/2014    Hypertension     Knee pain, left     Obesity     Obsessive compulsive disorder     Osteoarthritis of knee 4/27/2018    Post-nasal drip     S/P total knee arthroplasty 1/24/2014    Sleep apnea     Urgency incontinence     Urinary, incontinence, stress female     Wears glasses     Wears partial dentures     upper     Past Surgical History:   Procedure Laterality Date    BACK SURGERY      CARPAL TUNNEL RELEASE Bilateral     10 yrs ago    CATARACT REMOVAL WITH IMPLANT Bilateral     COLONOSCOPY  7/1/2021    COLONOSCOPY POLYPECTOMY HOT BIOPSY performed by Naveed Marquez MD at [facility]    COLONOSCOPY  7/1/2021    COLONOSCOPY W/ ENDOSCOPIC MUCOSAL RESECTION performed by [facility]ed [facility]quez MD at [facility]    COLONOSCOPY  7/1/2021    COLONOSCOPY WITH BIOPSY performed by Naveed Marquez MD at [facility]    DILATION AND CURETTAGE OF UTERUS      EYE SURGERY      JOINT REPLACEMENT      LUMBAR DISCECTOMY      TONSILLECTOMY      TOTAL KNEE ARTHROPLASTY Right     TOTAL KNEE ARTHROPLASTY      TOTAL KNEE ARTHROPLASTY Left 01/23/2014     Family History   Problem Relation Age of Onset    Heart Disease Mother     Heart Disease Brother     Stroke Other     Colon Cancer Other     Coronary Art Dis Other    Plan  Informed the patient that I do not see the obviously wrong with her knees or her hip. I think that she has degenerative disease of her lumbar spine based on her examination and her history. Patient will be referred to Dr. Ania Hatch for further work-up.       Provider Attestation:  Bessy Navarro, personally performed the services described in this documentation. All medical record entries made by the scribe were at my direction and in my presence. I have reviewed the chart and discharge instructions and agree that the records reflect my personal performance and is accurate and complete. Kieran Hunter MD. 12/20/21      Please note that this chart was generated using voice recognition Dragon dictation software. Although every effort was made to ensure the accuracy of this automated transcription, some errors in transcription may have occurred.

## 2022-01-03 NOTE — TELEPHONE ENCOUNTER
E-scribe request from [address] [place] [address] for Valsarrtan-Hydrochlorothiazide 0160-12.5 mg, no future appointments scheduled. Last seen by Dr. Melany Garza  on 4/8/21. Patient cancelled appt on 10/21/21. LVM for patient to call and schedule an appt. Health Maintenance   Topic Date Due    DTaP/Tdap/Td vaccine (1 - Tdap) Never done    Shingles Vaccine (1 of 2) Never done    Lipid screen  05/04/2021    COVID-19 Vaccine (3 - Booster for Pfizer series) 08/17/2021    Flu vaccine (1) 09/01/2021    Potassium monitoring  09/10/2021    Creatinine monitoring  09/10/2021    Annual Wellness Visit (AWV)  10/07/2021    Depression Monitoring  04/08/2022    DEXA (modify frequency per FRAX score)  Completed    Pneumococcal 65+ years Vaccine  Completed    Hepatitis C screen  Completed    Hepatitis A vaccine  Aged Out    Hib vaccine  Aged Out    Meningococcal (ACWY) vaccine  Aged Out             (applicable per patient's age: Cancer Screenings, Depression Screening, Fall Risk Screening, Immunizations)    Hemoglobin A1C (%)   Date Value   04/08/2021 6.4   05/04/2020 7.0 (H)   04/29/2019 6.7     Microalb/Crt.  Ratio (mcg/mg creat)   Date Value   11/05/2019 13     LDL Cholesterol (mg/dL)   Date Value   05/04/2020 50     AST (U/L)   Date Value   10/23/2018 27     ALT (U/L)   Date Value   10/23/2018 22     BUN (mg/dL)   Date Value   09/10/2020 25 (H)      (goal A1C is < 7)   (goal LDL is <100) need 30-50% reduction from baseline     BP Readings from Last 3 Encounters:   07/01/21 112/63   07/01/21 105/60   05/21/21 98/65    (goal /80)      All Future Testing planned in [facility]:  Lab Frequency Next Occurrence   Full PFT Study With Bronchodilator Once 11/03/2021       Next Visit Date:  Future Appointments   Date Time Provider Eulalia Zuleta   2/23/2022  3:00 PM Felix Lewis MD [facility]            Patient Active Problem List:     S/P total knee arthroplasty     Type 2 diabetes mellitus with diabetic nephropathy, without long-term current use of insulin (HCC)     Hypertension     Asthma     Arthritis     Acquired trigger finger     Osteoarthritis of knee     Morbid obesity with BMI of 40.0-44.9, adult ([facility])     Current mild episode of major depressive disorder without prior episode ([facility]     Bilateral carotid artery stenosis     Other specified glaucoma

## 2022-01-04 RX ORDER — VALSARTAN AND HYDROCHLOROTHIAZIDE 160; 12.5 MG/1; MG/1
TABLET, FILM COATED ORAL
Qty: 90 TABLET | Refills: 0 | Status: SHIPPED | OUTPATIENT
Start: 2022-01-04 | End: 2022-07-25

## 2022-01-17 NOTE — TELEPHONE ENCOUNTER
Request for Metformin. Next Visit Date:  Future Appointments   Date Time Provider Eulalia Chongi   2/23/2022  3:00 PM Allan Jackson  [address] Maintenance   Topic Date Due    DTaP/Tdap/Td vaccine (1 - Tdap) Never done    Shingles Vaccine (1 of 2) Never done    Lipid screen  05/04/2021    COVID-19 Vaccine (3 - Booster for Pfizer series) 08/17/2021    Flu vaccine (1) 09/01/2021    Potassium monitoring  09/10/2021    Creatinine monitoring  09/10/2021    Annual Wellness Visit (AWV)  10/07/2021    Depression Monitoring  04/08/2022    DEXA (modify frequency per FRAX score)  Completed    Pneumococcal 65+ years Vaccine  Completed    Hepatitis C screen  Completed    Hepatitis A vaccine  Aged Out    Hib vaccine  Aged Out    Meningococcal (ACWY) vaccine  Aged Out       Hemoglobin A1C (%)   Date Value   04/08/2021 6.4   05/04/2020 7.0 (H)   04/29/2019 6.7             ( goal A1C is < 7)   Microalb/Crt.  Ratio (mcg/mg creat)   Date Value   11/05/2019 13     LDL Cholesterol (mg/dL)   Date Value   05/04/2020 50       (goal LDL is <100)   AST (U/L)   Date Value   10/23/2018 27     ALT (U/L)   Date Value   10/23/2018 22     BUN (mg/dL)   Date Value   09/10/2020 25 (H)     BP Readings from Last 3 Encounters:   07/01/21 112/63   07/01/21 105/60   05/21/21 98/65          (goal 120/80)    All Future Testing planned in [facility]  Lab Frequency Next Occurrence   Full PFT Study With Bronchodilator Once 11/03/2021         Patient Active Problem List:     S/P total knee arthroplasty     Type 2 diabetes mellitus with diabetic nephropathy, without long-term current use of insulin (HCC)     Hypertension     Asthma     Arthritis     Acquired trigger finger     Osteoarthritis of knee     Morbid obesity with BMI of 40.0-44.9, adult ([address]     Current mild episode of major depressive disorder without prior episode ([facility]     Bilateral carotid artery stenosis     Other specified glaucoma

## 2022-02-24 RX ORDER — CITALOPRAM 40 MG/1
TABLET ORAL
Qty: 90 TABLET | Refills: 3 | Status: SHIPPED | OUTPATIENT
Start: 2022-02-24

## 2022-02-24 NOTE — TELEPHONE ENCOUNTER
E-scribing request for [place] pt has no future appt. Last seen 4/8/21, LM on VM for pt to call office and schedule appt. Please advise. Health Maintenance   Topic Date Due    DTaP/Tdap/Td vaccine (1 - Tdap) Never done    Shingles Vaccine (1 of 2) Never done    Lipid screen  05/04/2021    COVID-19 Vaccine (3 - Booster for Pfizer series) 07/17/2021    Flu vaccine (1) 09/01/2021    Potassium monitoring  09/10/2021    Creatinine monitoring  09/10/2021    Annual Wellness Visit (AWV)  10/07/2021    Depression Monitoring  04/08/2022    DEXA (modify frequency per FRAX score)  Completed    Pneumococcal 65+ years Vaccine  Completed    Hepatitis C screen  Completed    Hepatitis A vaccine  Aged Out    Hib vaccine  Aged Out    Meningococcal (ACWY) vaccine  Aged Out             (applicable per patient's age: Cancer Screenings, Depression Screening, Fall Risk Screening, Immunizations)    Hemoglobin A1C (%)   Date Value   04/08/2021 6.4   05/04/2020 7.0 (H)   04/29/2019 6.7     Microalb/Crt. Ratio (mcg/mg creat)   Date Value   11/05/2019 13     LDL Cholesterol (mg/dL)   Date Value   05/04/2020 50     AST (U/L)   Date Value   10/23/2018 27     ALT (U/L)   Date Value   10/23/2018 22     BUN (mg/dL)   Date Value   09/10/2020 25 (H)      (goal A1C is < 7)   (goal LDL is <100) need 30-50% reduction from baseline     BP Readings from Last 3 Encounters:   07/01/21 112/63   07/01/21 105/60   05/21/21 98/65    (goal /80)      All Future Testing planned in [facility]:  Lab Frequency Next Occurrence   Full PFT Study With Bronchodilator Once 11/03/2021       Next Visit Date:  No future appointments.          Patient Active Problem List:     S/P total knee arthroplasty     Type 2 diabetes mellitus with diabetic nephropathy, without long-term current use of insulin ([facility]     Hypertension     Asthma     Arthritis     Acquired trigger finger     Osteoarthritis of knee     Morbid obesity with BMI of 40.0-44.9, adult ([facility] Current mild episode of major depressive disorder without prior episode ([facility]     Bilateral carotid artery stenosis     Other specified glaucoma

## 2022-06-21 ENCOUNTER — OFFICE VISIT (OUTPATIENT)
Dept: INTERNAL MEDICINE | Age: 77
End: 2022-06-21
Payer: MEDICARE

## 2022-06-21 VITALS
SYSTOLIC BLOOD PRESSURE: 127 MMHG | HEART RATE: 107 BPM | BODY MASS INDEX: 34.4 KG/M2 | OXYGEN SATURATION: 98 % | WEIGHT: 175.2 LBS | HEIGHT: 60 IN | DIASTOLIC BLOOD PRESSURE: 54 MMHG

## 2022-06-21 DIAGNOSIS — I50.22 CHRONIC SYSTOLIC (CONGESTIVE) HEART FAILURE (HCC): ICD-10-CM

## 2022-06-21 DIAGNOSIS — Z12.31 ENCOUNTER FOR SCREENING MAMMOGRAM FOR MALIGNANT NEOPLASM OF BREAST: ICD-10-CM

## 2022-06-21 DIAGNOSIS — E11.21 TYPE 2 DIABETES MELLITUS WITH DIABETIC NEPHROPATHY, WITHOUT LONG-TERM CURRENT USE OF INSULIN (HCC): Primary | ICD-10-CM

## 2022-06-21 DIAGNOSIS — F32.0 CURRENT MILD EPISODE OF MAJOR DEPRESSIVE DISORDER WITHOUT PRIOR EPISODE (HCC): ICD-10-CM

## 2022-06-21 DIAGNOSIS — R00.0 TACHYCARDIA: ICD-10-CM

## 2022-06-21 LAB — HBA1C MFR BLD: 8.4 %

## 2022-06-21 PROCEDURE — 1123F ACP DISCUSS/DSCN MKR DOCD: CPT | Performed by: INTERNAL MEDICINE

## 2022-06-21 PROCEDURE — 1090F PRES/ABSN URINE INCON ASSESS: CPT | Performed by: INTERNAL MEDICINE

## 2022-06-21 PROCEDURE — 83036 HEMOGLOBIN GLYCOSYLATED A1C: CPT | Performed by: INTERNAL MEDICINE

## 2022-06-21 PROCEDURE — G8427 DOCREV CUR MEDS BY ELIG CLIN: HCPCS | Performed by: INTERNAL MEDICINE

## 2022-06-21 PROCEDURE — G8417 CALC BMI ABV UP PARAM F/U: HCPCS | Performed by: INTERNAL MEDICINE

## 2022-06-21 PROCEDURE — 1036F TOBACCO NON-USER: CPT | Performed by: INTERNAL MEDICINE

## 2022-06-21 PROCEDURE — G8399 PT W/DXA RESULTS DOCUMENT: HCPCS | Performed by: INTERNAL MEDICINE

## 2022-06-21 PROCEDURE — 99214 OFFICE O/P EST MOD 30 MIN: CPT | Performed by: INTERNAL MEDICINE

## 2022-06-21 PROCEDURE — 3052F HG A1C>EQUAL 8.0%<EQUAL 9.0%: CPT | Performed by: INTERNAL MEDICINE

## 2022-06-21 RX ORDER — GLIMEPIRIDE 4 MG/1
4 TABLET ORAL
Qty: 90 TABLET | Refills: 2 | Status: SHIPPED | OUTPATIENT
Start: 2022-06-21

## 2022-06-21 SDOH — ECONOMIC STABILITY: FOOD INSECURITY: WITHIN THE PAST 12 MONTHS, YOU WORRIED THAT YOUR FOOD WOULD RUN OUT BEFORE YOU GOT MONEY TO BUY MORE.: NEVER TRUE

## 2022-06-21 SDOH — ECONOMIC STABILITY: FOOD INSECURITY: WITHIN THE PAST 12 MONTHS, THE FOOD YOU BOUGHT JUST DIDN'T LAST AND YOU DIDN'T HAVE MONEY TO GET MORE.: NEVER TRUE

## 2022-06-21 SDOH — ECONOMIC STABILITY: TRANSPORTATION INSECURITY
IN THE PAST 12 MONTHS, HAS THE LACK OF TRANSPORTATION KEPT YOU FROM MEDICAL APPOINTMENTS OR FROM GETTING MEDICATIONS?: NO

## 2022-06-21 SDOH — ECONOMIC STABILITY: TRANSPORTATION INSECURITY
IN THE PAST 12 MONTHS, HAS LACK OF TRANSPORTATION KEPT YOU FROM MEETINGS, WORK, OR FROM GETTING THINGS NEEDED FOR DAILY LIVING?: NO

## 2022-06-21 ASSESSMENT — PATIENT HEALTH QUESTIONNAIRE - PHQ9
9. THOUGHTS THAT YOU WOULD BE BETTER OFF DEAD, OR OF HURTING YOURSELF: 0
SUM OF ALL RESPONSES TO PHQ9 QUESTIONS 1 & 2: 0
SUM OF ALL RESPONSES TO PHQ QUESTIONS 1-9: 0
5. POOR APPETITE OR OVEREATING: 0
6. FEELING BAD ABOUT YOURSELF - OR THAT YOU ARE A FAILURE OR HAVE LET YOURSELF OR YOUR FAMILY DOWN: 0
4. FEELING TIRED OR HAVING LITTLE ENERGY: 0
SUM OF ALL RESPONSES TO PHQ QUESTIONS 1-9: 0
3. TROUBLE FALLING OR STAYING ASLEEP: 0
1. LITTLE INTEREST OR PLEASURE IN DOING THINGS: 0
8. MOVING OR SPEAKING SO SLOWLY THAT OTHER PEOPLE COULD HAVE NOTICED. OR THE OPPOSITE, BEING SO FIGETY OR RESTLESS THAT YOU HAVE BEEN MOVING AROUND A LOT MORE THAN USUAL: 0
7. TROUBLE CONCENTRATING ON THINGS, SUCH AS READING THE NEWSPAPER OR WATCHING TELEVISION: 0
SUM OF ALL RESPONSES TO PHQ QUESTIONS 1-9: 0
2. FEELING DOWN, DEPRESSED OR HOPELESS: 0
SUM OF ALL RESPONSES TO PHQ QUESTIONS 1-9: 0
10. IF YOU CHECKED OFF ANY PROBLEMS, HOW DIFFICULT HAVE THESE PROBLEMS MADE IT FOR YOU TO DO YOUR WORK, TAKE CARE OF THINGS AT HOME, OR GET ALONG WITH OTHER PEOPLE: 0

## 2022-06-21 ASSESSMENT — ENCOUNTER SYMPTOMS
BACK PAIN: 1
COUGH: 1
GASTROINTESTINAL NEGATIVE: 1
EYES NEGATIVE: 1

## 2022-06-21 ASSESSMENT — SOCIAL DETERMINANTS OF HEALTH (SDOH): HOW HARD IS IT FOR YOU TO PAY FOR THE VERY BASICS LIKE FOOD, HOUSING, MEDICAL CARE, AND HEATING?: NOT HARD AT ALL

## 2022-06-21 NOTE — PROGRESS NOTES
[facility]   Progress Note        Date of patient's visit: 6/21/2022  Patient's Name:  Hipolito Collado                   YOB: 1945        PCP:  Ike Flynn MD    Hipolito Collado is a 68 y.o. female who presents for   Chief Complaint   Patient presents with    Diabetes    and follow up of chronic medical problems. Patient Active Problem List   Diagnosis    S/P total knee arthroplasty    Type 2 diabetes mellitus with diabetic nephropathy, without long-term current use of insulin ([facility]    Hypertension    Asthma    Arthritis    Acquired trigger finger    Osteoarthritis of knee    Morbid obesity with BMI of 40.0-44.9, adult ([facility]    Current [facility] of major [facility] disorder [facility]    Bilateral carotid artery stenosis    Other specified glaucoma       HISTORY OF PRESENT ILLNESS:    History was obtained from the patient. Diabetes Mellitus  Patient presents for follow up of diabetes. Current symptoms include: hyperglycemia. Symptoms have stabilized. Patient denies foot ulcerations and nausea. Evaluation to date has included: hemoglobin A1C. Home sugars: BGs consistently in an acceptable range. Current treatment: no recent interventions. Has been working out and lost weight but has been snacking more. Patient's allergies, medications, past medical, surgical, social and family histories were reviewed and updated as appropriate.     ALLERGIES      Allergies   Allergen Reactions    Aspirin Other (See Comments)         MEDICATIONS:      Current Outpatient Medications   Medication Sig Dispense Refill    citalopram (CELEXA) 40 MG tablet TAKE ONE TABLET BY MOUTH DAILY 90 tablet 3    metFORMIN (GLUCOPHAGE) 1000 MG tablet TAKE ONE TABLET BY MOUTH TWICE A DAY WITH MEALS 180 tablet 4    valsartan-hydroCHLOROthiazide (DIOVAN-HCT) 160-12.5 MG per tablet TAKE ONE TABLET BY MOUTH DAILY 90 tablet 0    glimepiride (AMARYL) 4 MG tablet TAKE ONE TABLET BY MOUTH EVERY MORNING BEFORE BREAKFAST 90 tablet 0    simvastatin (ZOCOR) 40 MG tablet TAKE ONE TABLET BY MOUTH ONCE NIGHTLY 90 tablet 2    loratadine (CLARITIN) 10 MG tablet Take 1 tablet by mouth daily as needed (allergies) 30 tablet 5    Handicap Placard MISC by Does not apply route Dx: Osteoarthritis, asthma  Start 4/8/2021- 4/7/26 1 each 0    Latanoprost 0.005 % EMUL latanoprost 0.005 % eye drops      fluticasone (FLONASE) 50 MCG/ACT nasal spray 2 sprays by Nasal route daily 1 Bottle 11    albuterol sulfate  (90 Base) MCG/ACT inhaler Inhale 2 puffs into the lungs every 6 hours as needed for Wheezing 1 Inhaler 11    fluticasone (FLOVENT HFA) 110 MCG/ACT inhaler Inhale 2 puffs into the lungs 2 times daily 1 Inhaler 11    acetaminophen (TYLENOL) 500 MG tablet Take 500 mg by mouth      aspirin 81 MG tablet Take 81 mg by mouth daily       No current facility-administered medications for this visit.        Patient Care Team:  Maryuri Murrieta MD as PCP - General (Internal Medicine)  Maryuri Murrieta MD as PCP - [facility]  Antonia Puentes MD as Consulting Physician (Pulmonology)    PAST MEDICAL AND SURGICAL HISTORY:      Past Medical History:   Diagnosis Date    Acquired trigger finger 4/27/2018    ROSELIA (acute kidney injury) ([facility] 2011    Allergic rhinitis     Anesthesia complication     after or bp dropped kidney labs elevated for few years    Anxiety     Arthritis     Asthma     Carotid stenosis, asymptomatic     16-49%    CKD (chronic kidney disease) stage 3, GFR 30-59 ml/min [facility]     Diabetes mellitus ([facility]     Dry skin     Dyspnea     Frequency of urination     History of bronchitis     Hyperkalemia 1/24/2014    Hypertension     Knee pain, left     Obesity     Obsessive compulsive disorder     Osteoarthritis of knee 4/27/2018    Post-nasal drip     S/P total knee arthroplasty 1/24/2014    Sleep apnea     Urgency incontinence     Urinary, incontinence, stress female     Wears glasses     Wears partial dentures     upper     Past Surgical History:   Procedure Laterality Date    BACK SURGERY      CARPAL TUNNEL RELEASE Bilateral     10 yrs ago    CATARACT EXTRACTION W/  INTRAOCULAR LENS IMPLANT Bilateral     COLONOSCOPY  2021    COLONOSCOPY POLYPECTOMY HOT BIOPSY performed by [facility]s Ku [facility] at [facility]    COLONOSCOPY  2021    COLONOSCOPY W/ ENDOSCOPIC MUCOSAL RESECTION performed [facility]    COLONOSCOPY  2021    COLONOSCOPY WITH BIOPSY performed by [facility]ús Ku MD at [facility]   [address] DISCECTOMY      TONSILLECTOMY      TOTAL KNEE ARTHROPLASTY Right     TOTAL KNEE ARTHROPLASTY      TOTAL KNEE ARTHROPLASTY Left 2014       SOCIAL HISTORY      Social History     Tobacco Use    Smoking status: Former Smoker     Packs/day: 1.00     Types: Cigarettes     Quit date: 1998     Years since quittin.4    Smokeless tobacco: Never Used   Substance Use Topics    Alcohol use: Yes     Comment: Jennifer Marcus  reports that she quit smoking about 24 years ago. Her smoking use included cigarettes. She smoked 1.00 pack per day. She has never used smokeless tobacco.    FAMILY HISTORY:      Family History   Problem Relation Age of Onset    Heart Disease Mother     Heart Disease Brother     Stroke Other     Colon Cancer Other     Coronary Art Dis Other        REVIEW OF SYSTEMS:    Review of Systems   Constitutional: Negative. HENT: Positive for postnasal drip. Eyes: Negative. Respiratory: Positive for cough. Cardiovascular: Negative. Gastrointestinal: Negative. Endocrine: Negative. Genitourinary: Negative. Musculoskeletal: Positive for back pain. Skin: Negative. Allergic/Immunologic: Positive for environmental allergies. Neurological: Positive for light-headedness.    Hematological: Negative. Psychiatric/Behavioral: Negative.           PHYSICAL EXAM:      Vitals:    06/21/22 1439   BP: (!) 127/54   Pulse: (!) 107   SpO2:      BP Readings from Last 3 Encounters:   06/21/22 (!) 127/54   07/01/21 112/63   07/01/21 105/60       Physical Examination: General appearance - alert, well appearing, and in no distress  Mental status - alert, oriented to person, place, and time  Chest - clear to auscultation, no wheezes, rales or rhonchi, symmetric air entry  Heart - normal rate and regular rhythm  Abdomen - bowel sounds normal  Back exam - full range of motion, no tenderness, palpable spasm or pain on motion  Neurological - alert, oriented, normal speech, no focal findings or movement disorder noted  Musculoskeletal - no joint tenderness, deformity or swelling  Extremities - no pedal edema noted    LABORATORY FINDINGS:    CBC:   Lab Results   Component Value Date    WBC 10.5 05/04/2020    HGB 11.5 05/04/2020     05/04/2020     BMP:    Lab Results   Component Value Date     09/10/2020    K 4.9 09/10/2020     09/10/2020    CO2 20 09/10/2020    BUN 25 09/10/2020    CREATININE 1.21 09/10/2020    GLUCOSE 200 09/10/2020    GLUCOSE 85 09/30/2011     Hemoglobin A1C:   Lab Results   Component Value Date    LABA1C 8.4 06/21/2022     Microalbumin Urine:   Lab Results   Component Value Date    MICROALBUR 35 11/05/2019     Lipid profile:   Lab Results   Component Value Date    CHOL 140 05/04/2020    TRIG 100 05/04/2020    HDL 70 05/04/2020     Thyroid functions:   Lab Results   Component Value Date    TSH 2.37 05/04/2020      Hepatic functions:   Lab Results   Component Value Date    ALT 22 10/23/2018    AST 27 10/23/2018    PROT 7.5 10/23/2018    BILITOT 0.45 10/23/2018    LABALBU 4.2 10/23/2018     Urine Analysis: No results found for: [address] [address]:      Health Maintenance Due   Topic Date Due    DTaP/Tdap/Td vaccine (1 - Tdap) Never done    Shingles vaccine (1 of 2) Never done    Lipids  05/04/2021    COVID-19 Vaccine (3 - Booster for [employer] series) 07/17/2021    Annual Wellness Visit (AWV)  10/07/2021       ASSESSMENT AND PLAN:       Diagnosis Orders   1. Type 2 diabetes mellitus with diabetic nephropathy, without long-term current use of insulin (HCC)  POCT glycosylated hemoglobin (Hb A1C)    Lipid, Fasting    glimepiride (AMARYL) 4 MG tablet   2. Tachycardia  TSH with Reflex    CBC with Auto Differential    Basic Metabolic Panel   3. Chronic systolic (congestive) heart failure     4. Current mild episode of major depressive disorder without prior episode ([facility]     5. Encounter for screening mammogram for malignant neoplasm of breast  HECTOR DIGITAL SCREEN W OR WO CAD BILATERAL   A1C worsened. Had stopped taking amaryl. Will resume and continue with diet and exercise. FOLLOW UP:   1. Michael Salazar received counseling on the following healthy behaviors: nutrition and exercise    2. Reviewed prior labs and health maintenance. 3.  Discussed use, benefit, and side effects of prescribed medications. Barriers to medication compliance addressed. All patient questions answered. Pt voiced understanding. 4.  Continue current medications, diet and exercise. Orders Placed This Encounter   Medications    glimepiride (AMARYL) 4 MG tablet     Sig: Take 1 tablet by mouth every morning (before breakfast)     Dispense:  90 tablet     Refill:  2          Completed Refills               Requested Prescriptions     Signed Prescriptions Disp Refills    glimepiride (AMARYL) 4 MG tablet 90 tablet 2     Sig: Take 1 tablet by mouth every morning (before breakfast)       5. Patient given educational materials - see patient instructions    6. Was a self-tracking handout given in paper form or via [employer]? Yes  If yes, see orders or list here.       Orders Placed This Encounter   Procedures    HECTOR DIGITAL SCREEN W OR WO CAD BILATERAL     Standing Status:   Future     Standing Expiration Date:   8/21/2023    Lipid, Fasting     Standing Status:   Future     Standing Expiration Date:   6/21/2023    TSH with Reflex     Please get this labwork done before your next visit. Standing Status:   Future     Standing Expiration Date:   6/21/2023    CBC with Auto Differential     Please get this labwork done before your next visit. Standing Status:   Future     Standing Expiration Date:   6/21/2023    Basic Metabolic Panel     Please get this labwork done before your next visit. Standing Status:   Future     Standing Expiration Date:   6/20/2023    POCT glycosylated hemoglobin (Hb A1C)       Return in about 6 months (around 12/21/2022). Patient voiced understanding and agreed to treatment plan. This note is created with the assistance of a speech-recognition program. While intending to generate a document that actually reflects the content of the visit, the document can still have some mistakes which may not have been identified and corrected by editing.       Dr Marcell Martinez MD, [address]  Associate , Department of Internal Medicine  Resident Ambulatory Site Medical Director  [address] [facility] Internal Medicine  [facility]  Internal Medicine Clerkship - Linsey Kyle                   6/23/2022, 1:04 PM

## 2022-06-30 NOTE — TELEPHONE ENCOUNTER
Request for Zocor. Next Visit Date:  Future Appointments   Date Time Provider Eulalia Merlyn   9/9/2022  3:00 PM SCHEDULE, [facility] RESPIRATORY SPEC Resp Spec [address] Maintenance   Topic Date Due    DTaP/Tdap/Td vaccine (1 - Tdap) Never done    Shingles vaccine (1 of 2) Never done    Lipids  05/04/2021    COVID-19 Vaccine (3 - Booster for [employer] series) 07/17/2021    Annual Wellness Visit (AWV)  10/07/2021    Flu vaccine (Season Ended) 09/01/2022    Depression Monitoring  06/21/2023    DEXA (modify frequency per FRAX score)  Completed    Pneumococcal 65+ years Vaccine  Completed    Hepatitis C screen  Completed    Hepatitis A vaccine  Aged Out    Hib vaccine  Aged Out    Meningococcal (ACWY) vaccine  Aged Out       Hemoglobin A1C (%)   Date Value   06/21/2022 8.4   04/08/2021 6.4   05/04/2020 7.0 (H)             ( goal A1C is < 7)   Microalb/Crt.  Ratio (mcg/mg creat)   Date Value   11/05/2019 13     LDL Cholesterol (mg/dL)   Date Value   05/04/2020 50       (goal LDL is <100)   AST (U/L)   Date Value   10/23/2018 27     ALT (U/L)   Date Value   10/23/2018 22     BUN (mg/dL)   Date Value   09/10/2020 25 (H)     BP Readings from Last 3 Encounters:   06/21/22 (!) 127/54   07/01/21 112/63   07/01/21 105/60          (goal 120/80)    All Future Testing planned in [facility]  Lab Frequency Next Occurrence   Lipid, Fasting Once 06/21/2022   TSH with Reflex Once 06/21/2022   CBC with Auto Differential Once 49/00/9506   Basic Metabolic Panel Once 00/62/6490   HECTOR DIGITAL SCREEN W OR WO CAD BILATERAL Once 09/21/2022         Patient Active Problem List:     S/P total knee arthroplasty     Type 2 diabetes mellitus with diabetic nephropathy, without long-term current use of insulin (HCC)     Hypertension     Asthma     Arthritis     Acquired trigger finger     Osteoarthritis of knee     Morbid obesity with BMI of 40.0-44.9, adult ([address]     Current mild episode of major [facility] disorder [facility]     Bilateral carotid artery stenosis     Other specified glaucoma     Chronic systolic (congestive) heart failure

## 2022-07-05 RX ORDER — SIMVASTATIN 40 MG
TABLET ORAL
Qty: 90 TABLET | Refills: 2 | Status: SHIPPED | OUTPATIENT
Start: 2022-07-05

## 2022-07-21 NOTE — TELEPHONE ENCOUNTER
E-scribe request for medication valsartan-hydroCHLOROthiazide. Pt is on wait list till December. Health Maintenance   Topic Date Due    DTaP/Tdap/Td vaccine (1 - Tdap) Never done    Shingles vaccine (1 of 2) Never done    Lipids  05/04/2021    COVID-19 Vaccine (3 - Booster for [employer] series) 07/17/2021    Annual Wellness Visit (AWV)  10/07/2021    Flu vaccine (1) 09/01/2022    Depression Monitoring  06/21/2023    DEXA (modify frequency per FRAX score)  Completed    Pneumococcal 65+ years Vaccine  Completed    Hepatitis C screen  Completed    Hepatitis A vaccine  Aged Out    Hib vaccine  Aged Out    Meningococcal (ACWY) vaccine  Aged Out             (applicable per patient's age: Cancer Screenings, Depression Screening, Fall Risk Screening, Immunizations)    Hemoglobin A1C (%)   Date Value   06/21/2022 8.4   04/08/2021 6.4   05/04/2020 7.0 (H)     Microalb/Crt.  Ratio (mcg/mg creat)   Date Value   11/05/2019 13     LDL Cholesterol (mg/dL)   Date Value   05/04/2020 50     AST (U/L)   Date Value   10/23/2018 27     ALT (U/L)   Date Value   10/23/2018 22     BUN (mg/dL)   Date Value   09/10/2020 25 (H)      (goal A1C is < 7)   (goal LDL is <100) need 30-50% reduction from baseline     BP Readings from Last 3 Encounters:   06/21/22 (!) 127/54   07/01/21 112/63   07/01/21 105/60    (goal /80)      All Future Testing planned in [facility]:  Lab Frequency Next Occurrence   Lipid, Fasting Once 06/21/2022   TSH with Reflex Once 06/21/2022   CBC with Auto Differential Once 00/41/0309   Basic Metabolic Panel Once 96/45/5566   HECTOR DIGITAL SCREEN W OR WO CAD BILATERAL Once 09/21/2022       Next Visit Date:  Future Appointments   Date Time Provider Eulalia Zuleta   9/9/2022  3:00 PM SCHEDULE, [facility] RESPIRATORY SPEC Resp Spec [email or web address]            Patient Active Problem List:     S/P total knee arthroplasty     Type 2 diabetes mellitus with diabetic nephropathy, without long-term current use of insulin ([address]     Hypertension Asthma     Arthritis     Acquired trigger finger     Osteoarthritis of knee     Morbid obesity with BMI of 40.0-44.9, adult (HCC)     Current mild episode of major depressive disorder without prior episode ([facility]     Bilateral carotid artery stenosis     Other specified glaucoma     Chronic systolic (congestive) heart failure

## 2022-07-25 RX ORDER — VALSARTAN AND HYDROCHLOROTHIAZIDE 160; 12.5 MG/1; MG/1
TABLET, FILM COATED ORAL
Qty: 90 TABLET | Refills: 0 | Status: SHIPPED | OUTPATIENT
Start: 2022-07-25

## 2022-11-15 ENCOUNTER — TELEPHONE (OUTPATIENT)
Dept: INTERNAL MEDICINE | Age: 77
End: 2022-11-15

## 2022-11-15 NOTE — TELEPHONE ENCOUNTER
----- Message from Christelle Jean sent at 11/15/2022 11:21 AM EST -----  Subject: Appointment Request    Reason for Call: Established Patient Appointment needed: Routine Existing   Condition Follow Up (Diabetes)    QUESTIONS    Reason for appointment request? Available appointments did not meet   patient need     Additional Information for Provider?  Pt is requesting a f/u appt in the   month of Dec, please call pt to schedule  ---------------------------------------------------------------------------  --------------  [id number]  [phone]; OK to leave message on voicemail  ---------------------------------------------------------------------------  --------------  SCRIPT ANSWERS  COVID Screen: Doron Tello

## 2022-12-03 ENCOUNTER — HOSPITAL ENCOUNTER (OUTPATIENT)
Age: 77
Discharge: HOME | End: 2022-12-03
Payer: MEDICARE

## 2022-12-03 ENCOUNTER — HOSPITAL ENCOUNTER (OUTPATIENT)
Dept: MAMMOGRAPHY | Age: 77
End: 2022-12-03
Payer: MEDICARE

## 2022-12-03 DIAGNOSIS — Z12.31 ENCOUNTER FOR SCREENING MAMMOGRAM FOR MALIGNANT NEOPLASM OF BREAST: ICD-10-CM

## 2022-12-03 DIAGNOSIS — R00.0 TACHYCARDIA: ICD-10-CM

## 2022-12-03 DIAGNOSIS — E11.21 TYPE 2 DIABETES MELLITUS WITH DIABETIC NEPHROPATHY, WITHOUT LONG-TERM CURRENT USE OF INSULIN (HCC): ICD-10-CM

## 2022-12-03 LAB
ABSOLUTE EOS #: 0.28 K/UL (ref 0–0.44)
ABSOLUTE IMMATURE GRANULOCYTE: <0.03 K/UL (ref 0–0.3)
ABSOLUTE LYMPH #: 2.7 K/UL (ref 1.1–3.7)
ABSOLUTE MONO #: 0.55 K/UL (ref 0.1–1.2)
ANION GAP SERPL CALCULATED.3IONS-SCNC: 18 MMOL/L (ref 9–17)
BASOPHILS # BLD: 2 % (ref 0–2)
BASOPHILS ABSOLUTE: 0.11 K/UL (ref 0–0.2)
BUN BLDV-MCNC: 27 MG/DL (ref 8–23)
CALCIUM SERPL-MCNC: 9.5 MG/DL (ref 8.6–10.4)
CHLORIDE BLD-SCNC: 107 MMOL/L (ref 98–107)
CHOLESTEROL, FASTING: 156 MG/DL
CHOLESTEROL/HDL RATIO: 2.9
CO2: 18 MMOL/L (ref 20–31)
CREAT SERPL-MCNC: 1.46 MG/DL (ref 0.5–0.9)
EOSINOPHILS RELATIVE PERCENT: 4 % (ref 1–4)
GFR SERPL CREATININE-BSD FRML MDRD: 37 ML/MIN/1.73M2
GLUCOSE BLD-MCNC: 154 MG/DL (ref 70–99)
HCT VFR BLD CALC: 30.9 % (ref 36.3–47.1)
HDLC SERPL-MCNC: 53 MG/DL
HEMOGLOBIN: 9.1 G/DL (ref 11.9–15.1)
IMMATURE GRANULOCYTES: 0 %
LDL CHOLESTEROL: 83 MG/DL (ref 0–130)
LYMPHOCYTES # BLD: 36 % (ref 24–43)
MCH RBC QN AUTO: 25.7 PG (ref 25.2–33.5)
MCHC RBC AUTO-ENTMCNC: 29.4 G/DL (ref 28.4–34.8)
MCV RBC AUTO: 87.3 FL (ref 82.6–102.9)
MONOCYTES # BLD: 7 % (ref 3–12)
NRBC AUTOMATED: 0 PER 100 WBC
PDW BLD-RTO: 15.8 % (ref 11.8–14.4)
PLATELET # BLD: 213 K/UL (ref 138–453)
PMV BLD AUTO: 11.4 FL (ref 8.1–13.5)
POTASSIUM SERPL-SCNC: 4.1 MMOL/L (ref 3.7–5.3)
RBC # BLD: 3.54 M/UL (ref 3.95–5.11)
RBC # BLD: ABNORMAL 10*6/UL
SEG NEUTROPHILS: 51 % (ref 36–65)
SEGMENTED NEUTROPHILS ABSOLUTE COUNT: 3.81 K/UL (ref 1.5–8.1)
SODIUM BLD-SCNC: 143 MMOL/L (ref 135–144)
TRIGLYCERIDE, FASTING: 98 MG/DL
TSH SERPL DL<=0.05 MIU/L-ACNC: 1.22 UIU/ML (ref 0.3–5)
WBC # BLD: 7.5 K/UL (ref 3.5–11.3)

## 2022-12-03 PROCEDURE — 84443 ASSAY THYROID STIM HORMONE: CPT

## 2022-12-03 PROCEDURE — 80048 BASIC METABOLIC PNL TOTAL CA: CPT

## 2022-12-03 PROCEDURE — 85025 COMPLETE CBC W/AUTO DIFF WBC: CPT

## 2022-12-03 PROCEDURE — 77063 BREAST TOMOSYNTHESIS BI: CPT

## 2022-12-03 PROCEDURE — 80061 LIPID PANEL: CPT

## 2022-12-03 PROCEDURE — 36415 COLL VENOUS BLD VENIPUNCTURE: CPT

## 2022-12-05 NOTE — TELEPHONE ENCOUNTER
Request for valsartan-hctz. Next leslie on 12/13/22. Next Visit Date:  Future Appointments   Date Time Provider Eulalia Zuleta   12/13/2022  9:40 AM Carmel Lyn MD [address] [place] [address] [id number]   1/17/2023  8:30 AM SCHEDULE, [facility] RESPIRATORY SPEC Resp Spec [address] Maintenance   Topic Date Due    DTaP/Tdap/Td vaccine (1 - Tdap) Never done    Shingles vaccine (1 of 2) Never done    COVID-19 Vaccine (3 - Booster for Pfizer series) 04/14/2021    Annual Wellness Visit (AWV)  10/07/2021    Flu vaccine (1) 08/01/2022    Depression Monitoring  06/21/2023    Lipids  12/03/2023    DEXA (modify frequency per FRAX score)  Completed    Pneumococcal 65+ years Vaccine  Completed    Hepatitis C screen  Completed    Hepatitis A vaccine  Aged Out    Hib vaccine  Aged Out    Meningococcal (ACWY) vaccine  Aged Out       Hemoglobin A1C (%)   Date Value   06/21/2022 8.4   04/08/2021 6.4   05/04/2020 7.0 (H)             ( goal A1C is < 7)   Microalb/Crt.  Ratio (mcg/mg creat)   Date Value   11/05/2019 13     LDL Cholesterol (mg/dL)   Date Value   12/03/2022 83       (goal LDL is <100)   AST (U/L)   Date Value   10/23/2018 27     ALT (U/L)   Date Value   10/23/2018 22     BUN (mg/dL)   Date Value   12/03/2022 27 (H)     BP Readings from Last 3 Encounters:   06/21/22 (!) 127/54   07/01/21 112/63   07/01/21 105/60          (goal 120/80)    All Future Testing planned in [facility]  Lab Frequency Next Occurrence         Patient Active Problem List:     S/P total knee arthroplasty     Type 2 diabetes mellitus with diabetic nephropathy, without long-term current use of insulin (HCC)     Hypertension     Asthma     Arthritis     Acquired trigger finger     Osteoarthritis of knee     Morbid obesity with BMI of 40.0-44.9, adult (HCC)     Current mild episode of major depressive disorder without prior episode ([facility]     Bilateral carotid artery stenosis     Other specified glaucoma     Chronic systolic (congestive) heart failure

## 2022-12-06 RX ORDER — VALSARTAN AND HYDROCHLOROTHIAZIDE 160; 12.5 MG/1; MG/1
TABLET, FILM COATED ORAL
Qty: 90 TABLET | Refills: 0 | Status: SHIPPED | OUTPATIENT
Start: 2022-12-06

## 2022-12-07 DIAGNOSIS — N18.9 CHRONIC KIDNEY DISEASE, UNSPECIFIED CKD STAGE: Primary | ICD-10-CM

## 2022-12-13 ENCOUNTER — OFFICE VISIT (OUTPATIENT)
Dept: INTERNAL MEDICINE | Age: 77
End: 2022-12-13
Payer: MEDICARE

## 2022-12-13 VITALS
TEMPERATURE: 97.3 F | OXYGEN SATURATION: 99 % | DIASTOLIC BLOOD PRESSURE: 62 MMHG | HEIGHT: 60 IN | HEART RATE: 85 BPM | SYSTOLIC BLOOD PRESSURE: 117 MMHG | BODY MASS INDEX: 33.77 KG/M2 | WEIGHT: 172 LBS

## 2022-12-13 DIAGNOSIS — M79.601 RIGHT ARM PAIN: ICD-10-CM

## 2022-12-13 DIAGNOSIS — M79.604 PAIN OF RIGHT LOWER EXTREMITY: Primary | ICD-10-CM

## 2022-12-13 DIAGNOSIS — M79.604 RIGHT LEG PAIN: ICD-10-CM

## 2022-12-13 DIAGNOSIS — Z23 NEEDS FLU SHOT: ICD-10-CM

## 2022-12-13 PROCEDURE — 3078F DIAST BP <80 MM HG: CPT | Performed by: INTERNAL MEDICINE

## 2022-12-13 PROCEDURE — G8482 FLU IMMUNIZE ORDER/ADMIN: HCPCS | Performed by: INTERNAL MEDICINE

## 2022-12-13 PROCEDURE — 1123F ACP DISCUSS/DSCN MKR DOCD: CPT | Performed by: INTERNAL MEDICINE

## 2022-12-13 PROCEDURE — 1090F PRES/ABSN URINE INCON ASSESS: CPT | Performed by: INTERNAL MEDICINE

## 2022-12-13 PROCEDURE — 99213 OFFICE O/P EST LOW 20 MIN: CPT | Performed by: INTERNAL MEDICINE

## 2022-12-13 PROCEDURE — G8399 PT W/DXA RESULTS DOCUMENT: HCPCS | Performed by: INTERNAL MEDICINE

## 2022-12-13 PROCEDURE — G0008 ADMIN INFLUENZA VIRUS VAC: HCPCS | Performed by: INTERNAL MEDICINE

## 2022-12-13 PROCEDURE — G8417 CALC BMI ABV UP PARAM F/U: HCPCS | Performed by: INTERNAL MEDICINE

## 2022-12-13 PROCEDURE — G8427 DOCREV CUR MEDS BY ELIG CLIN: HCPCS | Performed by: INTERNAL MEDICINE

## 2022-12-13 PROCEDURE — 1036F TOBACCO NON-USER: CPT | Performed by: INTERNAL MEDICINE

## 2022-12-13 PROCEDURE — 3074F SYST BP LT 130 MM HG: CPT | Performed by: INTERNAL MEDICINE

## 2022-12-13 RX ORDER — GABAPENTIN 100 MG/1
100 CAPSULE ORAL NIGHTLY
Qty: 30 CAPSULE | Refills: 1 | Status: SHIPPED | OUTPATIENT
Start: 2022-12-13 | End: 2023-01-12

## 2022-12-13 ASSESSMENT — ENCOUNTER SYMPTOMS
BACK PAIN: 1
EYES NEGATIVE: 1
ALLERGIC/IMMUNOLOGIC NEGATIVE: 1
WHEEZING: 1
SHORTNESS OF BREATH: 0
GASTROINTESTINAL NEGATIVE: 1

## 2022-12-13 NOTE — PROGRESS NOTES
[facility]   Progress Note        Date of patient's visit: 12/13/2022  Patient's Name:  Tyrone Collado                   YOB: 1945        PCP:  Corry Polk MD    Tyrone Collado is a 68 y.o. female who presents for   Chief Complaint   Patient presents with    Diabetes     Pt did not take medication today, pt states she take morning medication around 10:30 a    Flank Pain     On right side, x 3 month and it getting worse, pt state her nephrology pt can not get in until april     Leg Pain     Pt states her right leg hurt when she walk     Health Maintenance     Pended flu, pt refused tdap and shingrix    and follow up of chronic medical problems. Patient Active Problem List   Diagnosis    S/P total knee arthroplasty    Type 2 diabetes mellitus with diabetic nephropathy, without long-term current use of insulin ([facility])    Hypertension    Asthma    Arthritis    Acquired trigger finger    Osteoarthritis of knee    Morbid obesity with BMI of 40.0-44.9, adult ([facility]    Current mild episode of major depressive disorder without prior episode ([facility]    Bilateral carotid artery stenosis    Other specified glaucoma    Chronic systolic (congestive) heart failure       HISTORY OF PRESENT ILLNESS:    History was obtained from the patient. 68year old female here today for right sided pain. She reports pain and numbness in right arm and hand. She denies weakness and anything dropping out of her hand. She also reports right sided leg pain which she starting to affect gait and balance. She reports pain down the lateral aspect of thigh into knee. She follows with vascular for Carotid artery stenosis and is suppose to obtain cta but is waiting to see nephrology. She has had a steady incline in Cr over years as far back as 2011 strongly suggestive for CKD. Patient's allergies, medications, past medical, surgical, social and family histories were reviewed and updated as appropriate.     ALLERGIES Allergies   Allergen Reactions    Aspirin Other (See Comments)         MEDICATIONS:      Current Outpatient Medications   Medication Sig Dispense Refill    valsartan-hydroCHLOROthiazide (DIOVAN-HCT) 160-12.5 MG per tablet TAKE ONE TABLET BY MOUTH DAILY 90 tablet 0    simvastatin (ZOCOR) 40 MG tablet TAKE ONE TABLET BY MOUTH ONCE NIGHTLY 90 tablet 2    glimepiride (AMARYL) 4 MG tablet Take 1 tablet by mouth every morning (before breakfast) 90 tablet 2    citalopram (CELEXA) 40 MG tablet TAKE ONE TABLET BY MOUTH DAILY 90 tablet 3    metFORMIN (GLUCOPHAGE) 1000 MG tablet TAKE ONE TABLET BY MOUTH TWICE A DAY WITH MEALS 180 tablet 4    Latanoprost 0.005 % EMUL latanoprost 0.005 % eye drops      fluticasone (FLONASE) 50 MCG/ACT nasal spray 2 sprays by Nasal route daily 1 Bottle 11    albuterol sulfate  (90 Base) MCG/ACT inhaler Inhale 2 puffs into the lungs every 6 hours as needed for Wheezing 1 Inhaler 11    fluticasone (FLOVENT HFA) 110 MCG/ACT inhaler Inhale 2 puffs into the lungs 2 times daily 1 Inhaler 11    acetaminophen (TYLENOL) 500 MG tablet Take 500 mg by mouth      aspirin 81 MG tablet Take 81 mg by mouth daily      loratadine (CLARITIN) 10 MG tablet Take 1 tablet by mouth daily as needed (allergies) (Patient not taking: Reported on 12/13/2022) 30 tablet 5     No current facility-administered medications for this visit.        Patient Care Team:  Gopal Phillip MD as PCP - General (Internal Medicine)  Gopal Phillip MD as PCP - [facility]  Keena Ramires MD as Consulting Physician (Pulmonology)    PAST MEDICAL AND SURGICAL HISTORY:      Past Medical History:   Diagnosis Date    Acquired trigger finger 4/27/2018    ROSELIA (acute kidney injury) ([facility] 2011    Allergic rhinitis     Anesthesia complication     after or bp dropped kidney labs elevated for few years    Anxiety     Arthritis     Asthma     Carotid stenosis, asymptomatic     16-49%    CKD (chronic kidney disease) stage 3, GFR 30-59 ml/min ([facility])     Diabetes mellitus ([facility]     Dry skin     Dyspnea     Frequency of urination     History of bronchitis     Hyperkalemia 2014    Hypertension     Knee pain, left     Obesity     Obsessive compulsive disorder     Osteoarthritis of knee 2018    Post-nasal drip     S/P total knee arthroplasty 2014    Sleep apnea     Urgency incontinence     Urinary, incontinence, stress female     Wears glasses     Wears partial dentures     upper     Past Surgical History:   Procedure Laterality Date    BACK SURGERY      CARPAL TUNNEL RELEASE Bilateral     10 yrs ago    CATARACT REMOVAL WITH IMPLANT Bilateral     COLONOSCOPY  2021    COLONOSCOPY POLYPECTOMY HOT BIOPSY performed by Jailene Escalante MD at [facility]    COLONOSCOPY  2021    COLONOSCOPY W/ ENDOSCOPIC MUCOSAL RESECTION performed by Jailene Escalante MD at [facility]    COLONOSCOPY  2021    COLONOSCOPY WITH BIOPSY performed by Jailene Escalante MD at [address] [place] [address] DISCECTOMY      TONSILLECTOMY      TOTAL KNEE ARTHROPLASTY Right     TOTAL KNEE ARTHROPLASTY      TOTAL KNEE ARTHROPLASTY Left 2014       SOCIAL HISTORY      Social History     Tobacco Use    Smoking status: Former     Packs/day: 1.00     Types: Cigarettes     Quit date: 1998     Years since quittin.9    Smokeless tobacco: Never   Substance Use Topics    Alcohol use: Yes     Comment: erickson Salinas  reports that she quit smoking about 24 years ago. Her smoking use included cigarettes. She smoked an average of 1 pack per day. She has never used smokeless tobacco.    FAMILY HISTORY:      Family History   Problem Relation Age of Onset    Heart Disease Mother     Heart Disease Brother     Stroke Other     Colon Cancer Other     Coronary Art Dis Other        REVIEW OF SYSTEMS:    Review of Systems   Constitutional:  Positive for fatigue.  Negative for activity change. HENT: Negative. Eyes: Negative. Respiratory:  Positive for wheezing. Negative for shortness of breath. Cardiovascular: Negative. Gastrointestinal: Negative. Endocrine: Negative. Genitourinary:  Positive for flank pain. Musculoskeletal:  Positive for back pain. Skin: Negative. Allergic/Immunologic: Negative. Neurological:  Positive for numbness. Negative for dizziness and light-headedness. Hematological: Negative. Psychiatric/Behavioral: Negative.          PHYSICAL EXAM:      Vitals:    12/13/22 0954   BP: 117/62   Pulse: 85   Temp: 97.3 °F (36.3 °C)   SpO2: 99%     BP Readings from Last 3 Encounters:   12/13/22 117/62   06/21/22 (!) 127/54   07/01/21 112/63       Physical Examination: General appearance - alert, well appearing, and in no distress  Mental status - alert, oriented to person, place, and time  Chest - clear to auscultation, no wheezes, rales or rhonchi, symmetric air entry  Heart - normal rate and regular rhythm  Abdomen - soft, nontender, nondistended, no masses or organomegaly  bowel sounds normal  Back exam - limited range of motion  Neurological - alert, oriented, normal speech, no focal findings or movement disorder noted  Musculoskeletal - no joint tenderness, deformity or swelling    LABORATORY FINDINGS:    CBC:   Lab Results   Component Value Date/Time    WBC 7.5 12/03/2022 11:33 AM    HGB 9.1 12/03/2022 11:33 AM     12/03/2022 11:33 AM     BMP:    Lab Results   Component Value Date/Time     12/03/2022 11:33 AM    K 4.1 12/03/2022 11:33 AM     12/03/2022 11:33 AM    CO2 18 12/03/2022 11:33 AM    BUN 27 12/03/2022 11:33 AM    CREATININE 1.46 12/03/2022 11:33 AM    GLUCOSE 154 12/03/2022 11:33 AM    GLUCOSE 85 09/30/2011 09:25 AM     Hemoglobin A1C:   Lab Results   Component Value Date/Time    LABA1C 8.4 06/21/2022 02:22 PM     Microalbumin Urine:   Lab Results   Component Value Date/Time    MICROALBUR 35 11/05/2019 09:33 AM Lipid profile:   Lab Results   Component Value Date/Time    CHOL 140 05/04/2020 12:27 PM    TRIG 100 05/04/2020 12:27 PM    HDL 53 12/03/2022 11:33 AM     Thyroid functions:   Lab Results   Component Value Date/Time    TSH 1.22 12/03/2022 11:33 AM      Hepatic functions:   Lab Results   Component Value Date/Time    ALT 22 10/23/2018 01:14 PM    AST 27 10/23/2018 01:14 PM    PROT 7.5 10/23/2018 01:14 PM    BILITOT 0.45 10/23/2018 01:14 PM    LABALBU 4.2 10/23/2018 01:14 PM     Urine Analysis: No results found for: [address] [address]:      Health Maintenance Due   Topic Date Due    DTaP/Tdap/Td vaccine (1 - Tdap) Never done    Shingles vaccine (1 of 2) Never done    COVID-19 Vaccine (3 - Booster for Paz Peter series) 04/14/2021    Annual Wellness Visit (AWV)  10/07/2021       ASSESSMENT AND PLAN:       Diagnosis Orders   1. Pain of right lower extremity  gabapentin (NEURONTIN) 100 MG capsule    XR LUMBAR SPINE (2-3 VIEWS)      2. Right leg pain  XR HIP 2-3 VW W PELVIS RIGHT    gabapentin (NEURONTIN) 100 MG capsule      3. Needs flu shot  [place] [address] ADDL      4. Right arm pain  XR CERVICAL SPINE (4-5 VIEWS)        Will obtain xrays  Trial gabapentin      FOLLOW UP:   1. Josemanuel Cortez received counseling on the following healthy behaviors: nutrition and exercise    2. Reviewed prior labs and health maintenance. 3.  Discussed use, benefit, and side effects of prescribed medications. Barriers to medication compliance addressed. All patient questions answered. Pt voiced understanding. 4.  Continue current medications, diet and exercise. Orders Placed This Encounter   Medications    gabapentin (NEURONTIN) 100 MG capsule     Sig: Take 1 capsule by mouth nightly for 30 days.      Dispense:  30 capsule     Refill:  1          Completed Refills               Requested Prescriptions     Signed Prescriptions Disp Refills    gabapentin (NEURONTIN) 100 MG capsule 30 capsule 1     Sig: Take

## 2022-12-15 ENCOUNTER — TELEPHONE (OUTPATIENT)
Dept: INTERNAL MEDICINE | Age: 77
End: 2022-12-15

## 2022-12-15 DIAGNOSIS — M25.561 RIGHT KNEE PAIN, UNSPECIFIED CHRONICITY: Primary | ICD-10-CM

## 2022-12-15 DIAGNOSIS — Z96.651 STATUS POST TOTAL RIGHT KNEE REPLACEMENT: ICD-10-CM

## 2022-12-15 NOTE — TELEPHONE ENCOUNTER
Pt calling in stating right knee pain is pretty bad, would like order for right knee xray to make sure nothing is wrong. Writer advised that knee pain may be d/t compensating for back pain, pt states she had TKA on right knee and wants to make sure everything is ok. Order pended.

## 2022-12-16 ENCOUNTER — HOSPITAL ENCOUNTER (OUTPATIENT)
Dept: GENERAL RADIOLOGY | Age: 77
End: 2022-12-16
Payer: MEDICARE

## 2022-12-16 ENCOUNTER — HOSPITAL ENCOUNTER (OUTPATIENT)
Age: 77
End: 2022-12-16
Payer: MEDICARE

## 2022-12-16 DIAGNOSIS — Z96.651 STATUS POST TOTAL RIGHT KNEE REPLACEMENT: ICD-10-CM

## 2022-12-16 DIAGNOSIS — M79.601 RIGHT ARM PAIN: ICD-10-CM

## 2022-12-16 DIAGNOSIS — M79.604 RIGHT LEG PAIN: ICD-10-CM

## 2022-12-16 DIAGNOSIS — M25.561 RIGHT KNEE PAIN, UNSPECIFIED CHRONICITY: ICD-10-CM

## 2022-12-16 DIAGNOSIS — M79.604 PAIN OF RIGHT LOWER EXTREMITY: ICD-10-CM

## 2022-12-16 PROCEDURE — 73562 X-RAY EXAM OF KNEE 3: CPT

## 2022-12-16 PROCEDURE — 73502 X-RAY EXAM HIP UNI 2-3 VIEWS: CPT

## 2022-12-16 PROCEDURE — 72100 X-RAY EXAM L-S SPINE 2/3 VWS: CPT

## 2022-12-16 PROCEDURE — 72050 X-RAY EXAM NECK SPINE 4/5VWS: CPT

## 2022-12-21 ENCOUNTER — HOSPITAL ENCOUNTER (OUTPATIENT)
Dept: ULTRASOUND IMAGING | Age: 77
Discharge: HOME | End: 2022-12-23
Payer: MEDICARE

## 2022-12-21 ENCOUNTER — TELEPHONE (OUTPATIENT)
Dept: INTERNAL MEDICINE | Age: 77
End: 2022-12-21

## 2022-12-21 DIAGNOSIS — Z96.651 STATUS POST TOTAL RIGHT KNEE REPLACEMENT: Primary | ICD-10-CM

## 2022-12-21 DIAGNOSIS — M19.90 ARTHRITIS: ICD-10-CM

## 2022-12-21 DIAGNOSIS — N18.9 CHRONIC KIDNEY DISEASE, UNSPECIFIED CKD STAGE: ICD-10-CM

## 2022-12-21 PROCEDURE — 76770 US EXAM ABDO BACK WALL COMP: CPT

## 2022-12-21 NOTE — TELEPHONE ENCOUNTER
PC- patient called in stating had recent testing done that you order the results are in patient chart. Patient had XR knee,XR spine,XR hip and US Renal done as well. Patient states she is still having pain. Please advise.

## 2022-12-22 RX ORDER — TRAMADOL HYDROCHLORIDE 50 MG/1
50 TABLET ORAL 2 TIMES DAILY PRN
Qty: 15 TABLET | Refills: 0 | Status: SHIPPED | OUTPATIENT
Start: 2022-12-22 | End: 2022-12-30

## 2022-12-22 NOTE — TELEPHONE ENCOUNTER
Pt is calling the office again inquiring on her Xr result she took on 12/16/22, pt want a call back [phone]

## 2022-12-22 NOTE — TELEPHONE ENCOUNTER
Xrays show knee ok, hardware and all. Mild arthritis in hip and pelvis and more significant arthritis in lower and upper spine. This could be a factor of the pain. Will call in ultram to use alternating with tylenol to look to improving pain.

## 2022-12-23 NOTE — TELEPHONE ENCOUNTER
Pc to pt ,letting her know that meds have been ordered and giving results , pt stated she understood

## 2022-12-28 DIAGNOSIS — M19.90 ARTHRITIS: ICD-10-CM

## 2022-12-28 DIAGNOSIS — Z96.651 STATUS POST TOTAL RIGHT KNEE REPLACEMENT: ICD-10-CM

## 2022-12-29 NOTE — TELEPHONE ENCOUNTER
Request for tramdol. Next leslie on 1/17/23    Next Visit Date:  Future Appointments   Date Time Provider Eulalia Zuleta   1/17/2023  8:30 AM SCHEDULE, [facility] RESPIRATORY SPEC Resp Spec [facility]   1/17/2023  2:20 PM Janie Vicente MD [facility] IM [facility]   4/28/2023  9:10 AM Seun Hodges MD [facility] None       Health Maintenance   Topic Date Due    DTaP/Tdap/Td vaccine (1 - Tdap) Never done    Shingles vaccine (1 of 2) Never done    COVID-19 Vaccine (3 - Booster for Pfizer series) 04/14/2021    Annual Wellness Visit (AWV)  10/07/2021    Depression Monitoring  06/21/2023    Lipids  12/03/2023    DEXA (modify frequency per FRAX score)  Completed    Flu vaccine  Completed    Pneumococcal 65+ years Vaccine  Completed    Hepatitis C screen  Completed    Hepatitis A vaccine  Aged Out    Hib vaccine  Aged Out    Meningococcal (ACWY) vaccine  Aged Out       Hemoglobin A1C (%)   Date Value   06/21/2022 8.4   04/08/2021 6.4   05/04/2020 7.0 (H)             ( goal A1C is < 7)   Microalb/Crt.  Ratio (mcg/mg creat)   Date Value   11/05/2019 13     LDL Cholesterol (mg/dL)   Date Value   12/03/2022 83       (goal LDL is <100)   AST (U/L)   Date Value   10/23/2018 27     ALT (U/L)   Date Value   10/23/2018 22     BUN (mg/dL)   Date Value   12/03/2022 27 (H)     BP Readings from Last 3 Encounters:   12/13/22 117/62   06/21/22 (!) 127/54   07/01/21 112/63          (goal 120/80)    All Future Testing planned in [facility]  Lab Frequency Next Occurrence   Protein / Creatinine Ratio, Urine Once 12/07/2022   Sodium, Urine, Random Once 19/89/9981   Basic Metabolic Panel Once 69/00/7286         Patient Active Problem List:     S/P total knee arthroplasty     Type 2 diabetes mellitus with diabetic nephropathy, without long-term current use of insulin (HCC)     Hypertension     Asthma     Arthritis     Acquired trigger finger     Osteoarthritis of knee     Morbid obesity with BMI of 40.0-44.9, adult ([facility] [address]     Current mild episode of major depressive disorder without prior episode [facility]     Bilateral carotid artery stenosis     Other specified glaucoma     Chronic systolic (congestive) heart failure

## 2022-12-30 DIAGNOSIS — M19.90 ARTHRITIS: Primary | ICD-10-CM

## 2022-12-30 RX ORDER — TRAMADOL HYDROCHLORIDE 50 MG/1
TABLET ORAL
Qty: 15 TABLET | Refills: 0 | Status: SHIPPED | OUTPATIENT
Start: 2022-12-30 | End: 2023-01-13

## 2023-01-11 ENCOUNTER — HOSPITAL ENCOUNTER (OUTPATIENT)
Dept: PAIN MANAGEMENT | Age: 78
Discharge: HOME | End: 2023-01-11
Payer: MEDICARE

## 2023-01-11 ENCOUNTER — TELEPHONE (OUTPATIENT)
Dept: PAIN MANAGEMENT | Age: 78
End: 2023-01-11

## 2023-01-11 VITALS
DIASTOLIC BLOOD PRESSURE: 73 MMHG | HEART RATE: 103 BPM | HEIGHT: 60 IN | SYSTOLIC BLOOD PRESSURE: 140 MMHG | WEIGHT: 172 LBS | OXYGEN SATURATION: 97 % | TEMPERATURE: 97.3 F | BODY MASS INDEX: 33.77 KG/M2

## 2023-01-11 DIAGNOSIS — M47.816 LUMBAR SPONDYLOSIS: ICD-10-CM

## 2023-01-11 DIAGNOSIS — M54.16 LUMBAR RADICULOPATHY: Primary | ICD-10-CM

## 2023-01-11 DIAGNOSIS — Z96.651 STATUS POST TOTAL RIGHT KNEE REPLACEMENT: ICD-10-CM

## 2023-01-11 DIAGNOSIS — M51.36 LUMBAR DEGENERATIVE DISC DISEASE: ICD-10-CM

## 2023-01-11 DIAGNOSIS — M19.90 ARTHRITIS: ICD-10-CM

## 2023-01-11 PROBLEM — M51.369 LUMBAR DEGENERATIVE DISC DISEASE: Status: ACTIVE | Noted: 2023-01-11

## 2023-01-11 PROCEDURE — 99204 OFFICE O/P NEW MOD 45 MIN: CPT | Performed by: STUDENT IN AN ORGANIZED HEALTH CARE EDUCATION/TRAINING PROGRAM

## 2023-01-11 PROCEDURE — 99203 OFFICE O/P NEW LOW 30 MIN: CPT

## 2023-01-11 RX ORDER — METHYLPREDNISOLONE 4 MG/1
TABLET ORAL
Qty: 1 KIT | Refills: 0 | Status: SHIPPED | OUTPATIENT
Start: 2023-01-11

## 2023-01-11 ASSESSMENT — PAIN SCALES - GENERAL: PAINLEVEL_OUTOF10: 10

## 2023-01-11 NOTE — PROGRESS NOTES
Chronic Pain Clinic Note     Encounter Date: 1/11/2023     SUBJECTIVE:  Chief Complaint   Patient presents with    New Patient     Back pain       History of Present Illness:   Paula Collado is a 68 y.o. female who presents with back pain    Medication Refill: n/a    Current Complaints of Pain:   Location: back   Radiation: both legs  Severity:  Severe  Pain Numerical Score -    Average: 10     Highest: 10  Lowest: 0  Character/Quality: Complains of pain that is aching and burning  Timing: Intermittent  Associated symptoms: none  Numbness: yes  Weakness: yes  Exacerbating factors: standing, walking  Alleviating factors: sitting  Length of time pain has been present: Started about 3 months ago  Inciting event/injury: no  Bowel/Bladder incontinence: no  Falls: no  Physical Therapy: no    History of Interventions:   Surgery: 1 - back  Injections: None    Imaging:    Lumbar x-ray 12/16/2022    Lumbar spine: Vertebral bodies fairly well maintained in height. Grade 1   retrolisthesis L3 on L4 is noted. Grade 1 anterolisthesis L1 on L2. Moderate to moderately severe degenerative and degenerative disc changes are   present L1 through L4. Moderate levo scoliotic curvature thoracolumbar spine   is noted. Pedicles are intact. Aorta is calcified without evidence of   aneurysm.   SI joints are symmetrical.       Past Medical History:   Diagnosis Date    Acquired trigger finger 4/27/2018    ROSELIA (acute kidney injury) ([address] 2011    Allergic rhinitis     Anesthesia complication     after or bp dropped kidney labs elevated for few years    Anxiety     Arthritis     Asthma     Carotid stenosis, asymptomatic     16-49%    CKD (chronic kidney disease) stage 3, GFR 30-59 ml/min ([facility])     Diabetes mellitus ([address]     Dry skin     Dyspnea     Frequency of urination     History of bronchitis     Hyperkalemia 1/24/2014    Hypertension     Knee pain, left     Obesity     Obsessive compulsive disorder     Osteoarthritis of knee 4/27/2018 Post-nasal drip     S/P total knee arthroplasty 2014    Sleep apnea     Urgency incontinence     Urinary, incontinence, stress female     Wears glasses     Wears partial dentures     upper       Past Surgical History:   Procedure Laterality Date    BACK SURGERY      CARPAL TUNNEL RELEASE Bilateral     10 yrs ago    CATARACT REMOVAL WITH IMPLANT Bilateral     COLONOSCOPY  2021    COLONOSCOPY POLYPECTOMY HOT BIOPSY performed by Francisco Javier Gibson MD at [facility]    COLONOSCOPY  2021    COLONOSCOPY W/ ENDOSCOPIC MUCOSAL RESECTION performed by Francisco Javier Gibson MD at [facility]    COLONOSCOPY  2021    COLONOSCOPY WITH BIOPSY performed by Francisco Javier Gibson MD at [address] [place] [address] [facility]      TONSILLECTOMY      TOTAL KNEE ARTHROPLASTY Right     TOTAL KNEE ARTHROPLASTY      TOTAL KNEE ARTHROPLASTY Left 2014       Family History   Problem Relation Age of Onset    Heart Disease Mother     Heart Disease Brother     Stroke Other     Colon Cancer Other     Coronary Art Dis Other        Social History     Socioeconomic History    Marital status:       Spouse name: Not on file    Number of children: Not on file    Years of education: Not on file    Highest education level: Not on file   Occupational History    Not on file   Tobacco Use    Smoking status: Former     Packs/day: 1.00     Types: Cigarettes     Quit date: 1998     Years since quittin.0    Smokeless tobacco: Never   Vaping Use    Vaping Use: Never used   Substance and Sexual Activity    Alcohol use: Yes     Comment: occas    Drug use: No    Sexual activity: Not on file   Other Topics Concern    Not on file   Social History Narrative    Not on file     Social Determinants of Health     Financial Resource Strain: Low Risk     Difficulty of Paying Living Expenses: Not hard at all   Food Insecurity: No Food Insecurity Worried About [facility] in the Last Year: Never true    [facility] in the Last Year: Never true   Transportation Needs: No Transportation Needs    Lack of Transportation (Medical): No    Lack of Transportation (Non-Medical): No   Physical Activity: Not on file   Stress: Not on file   Social Connections: Not on file   Intimate Partner Violence: Not on file   Housing Stability: Not on file       Medications & Allergies:   Current Outpatient Medications   Medication Instructions    acetaminophen (TYLENOL) 500 mg, Oral    albuterol sulfate  (90 Base) MCG/ACT inhaler 2 puffs, Inhalation, EVERY 6 HOURS PRN    aspirin 81 mg, Oral, DAILY    citalopram (CELEXA) 40 MG tablet TAKE ONE TABLET BY MOUTH DAILY    fluticasone (FLONASE) 50 MCG/ACT nasal spray 2 sprays, Nasal, DAILY    fluticasone (FLOVENT HFA) 110 MCG/ACT inhaler 2 puffs, Inhalation, 2 TIMES DAILY    gabapentin (NEURONTIN) 100 mg, Oral, NIGHTLY    glimepiride (AMARYL) 4 mg, Oral, DAILY BEFORE BREAKFAST    Latanoprost 0.005 % EMUL latanoprost 0.005 % eye drops    metFORMIN (GLUCOPHAGE) 1000 MG tablet TAKE ONE TABLET BY MOUTH TWICE A DAY WITH MEALS    methylPREDNISolone (MEDROL DOSEPACK) 4 MG tablet Take by mouth.     simvastatin (ZOCOR) 40 MG tablet TAKE ONE TABLET BY MOUTH ONCE NIGHTLY    traMADol (ULTRAM) 50 MG tablet TAKE ONE TABLET BY MOUTH TWICE A DAY AS NEEDED FOR PAIN FOR UP TO 7 DAYS, MAX DAILY AMOUNT IS 2 TABLETS    valsartan-hydroCHLOROthiazide (DIOVAN-HCT) 160-12.5 MG per tablet TAKE ONE TABLET BY MOUTH DAILY       Allergies   Allergen Reactions    Aspirin Other (See Comments)       Review of Systems:   Constitutional: negative for weight changes or fevers  Cardiovascular: negative for chest pain, palpitations, irregular heart beat  Respiratory: negative for dyspnea, cough, wheezing  Gastrointestinal: negative for constipation, diarrhea, nausea  Genitourinary: negative for bowel/bladder incontinence   Musculoskeletal: positive for low back pain  Neurological: Positive for radicular leg pain, leg weakness or numbness/tingling  Behavioral/Psych: negative for anxiety/depression   Hematological: negative for abnormal bleeding, anticoagulation use or antiplatelet use  All other systems reviewed and are negative    OBJECTIVE:    Vitals:    01/11/23 1057   BP: (!) 140/73   Pulse: (!) 103   Temp: 97.3 °F (36.3 °C)   SpO2: 97%       PHYSICAL EXAM    GENERAL: No acute distress, pleasant, well-appearing  HEENT: Normocephalic, atraumatic, Pupils equal and round  CARDIOVASCULAR: Well perfused, No peripheral cyanosis  PULMONARY: Good chest wall excursion, breathing unlabored  PSYCH: Appropriate affect and insight, non-pressured speech  SKIN: No rashes or lesions  MUSCULOSKELETAL:  Inspection: The back and extremities are symmetric and aligned. Muscle bulk is normal in appearance. Palpation: There is tenderness to palpation along the lumbar paraspinal musculature bilaterally  Lumbar range of motion is full  NEUROMUSCULAR:  Patient ambulates unassisted  Gait is nonantalgic  Sensation to light touch is intact in lower extremities  Strength is full in lower extremities  No ankle clonus    Special Tests:  Lumbar facet loading is positive bilaterally  Seated straight leg raise is positive on right      DIAGNOSIS:    ICD-10-CM    1. Lumbar radiculopathy  M54.16 MRI LUMBAR SPINE WO CONTRAST     [facility]      2. Lumbar degenerative disc disease  M51.36       3. Lumbar spondylosis  M47.816            ASSESSMENT:    Kathy Collado is a 68 y. o.female who presents with chronic low back pain and right leg pain. To review, patient noticed symptoms 4 months ago without injuries or trauma. She denies low back surgeries. The patient's history and physical examination are consistent with lumbar radiculopathy as the patient has pain starting in the low back radiating down into the right leg.   Patient has positive neural tension signs on examination with a positive seated straight leg raise. Her lumbar x-ray reveals multilevel degenerative changes, and I will order a lumbar MRI for further evaluation and treatment. I will start her on oral steroids as well as refer her to physical therapy to establish a home exercise program.    Neurologically, it appears the patient has full strength and normal sensation. There is no evidence of myelopathy on examination. There are no red flags in the patient's history. PLAN:  Medications: For nonopioid therapy, the following medications were prescribed:    -Continue medication prescribed by primary care physician    Opioid therapy:  -Not indicated    Interventions:  -Consider lumbar epidural steroid injection    Imaging:  -Ordered lumbar MRI  -Reviewed lumbar x-ray with patient in room    Behavioral Therapies:  -Continue daily stretching and home exercise program    Referrals:  -Physical therapy 2 times per week for 12 weeks    Follow-up Plan:  -After imaging    Patient was offered intervention where appropriate. Multi-modal Pain Therapy: The patient was explicitly considered for multimodal and interdisciplinary therapy. Non-opioid and non-pharmacological opportunities to enhance analgesia and quality of life have been and will continue to be pursued. Pedro Luu, DO  Interventional Pain Management/PM&R   [facility] This Encounter    MRI LUMBAR SPINE WO CONTRAST     Standing Status:   Future     Standing Expiration Date:   1/11/2024    [facility] - [place] [facility]     Referral Priority:   Routine     Referral Type:   Eval and Treat     Referral Reason:   Specialty Services Required     Requested Specialty:   Physical Therapist     Number of Visits Requested:   1    methylPREDNISolone (MEDROL DOSEPACK) 4 MG tablet     Sig: Take by mouth.      Dispense:  1 kit     Refill:  0

## 2023-01-11 NOTE — TELEPHONE ENCOUNTER
Pt calls requesting order for MRI of Sacral area as well as Lumbar I let her know I would reach out to Dr. Tristin Lundy who ordered the imaging

## 2023-01-12 RX ORDER — TRAMADOL HYDROCHLORIDE 50 MG/1
TABLET ORAL
Qty: 15 TABLET | Refills: 0 | Status: SHIPPED | OUTPATIENT
Start: 2023-01-12 | End: 2023-02-11

## 2023-01-12 NOTE — TELEPHONE ENCOUNTER
Request for tramadol. Next leslie on 1/31/23    Next Visit Date:  Future Appointments   Date Time Provider Eulalia Zuleta   1/13/2023 10:30 AM [facility] MRI RM 1 [place] MR [place] [place]   1/17/2023  8:30 AM SCHEDULE, [facility] RESPIRATORY SPEC Resp Spec [id number]   1/18/2023 10:40 AM SCHEDULE, [facility] Debera Nissen   1/31/2023 10:20 AM Yeison Chris MD [facility] IM [facility]   4/28/2023  9:10 AM Seun Sibley MD [facility] None       Health Maintenance   Topic Date Due    DTaP/Tdap/Td vaccine (1 - Tdap) Never done    Shingles vaccine (1 of 2) Never done    COVID-19 Vaccine (3 - Booster for Pfizer series) 04/14/2021    Annual Wellness Visit (AWV)  10/07/2021    Depression Monitoring  06/21/2023    Lipids  12/03/2023    DEXA (modify frequency per FRAX score)  Completed    Flu vaccine  Completed    Pneumococcal 65+ years Vaccine  Completed    Hepatitis C screen  Completed    Hepatitis A vaccine  Aged Out    Hib vaccine  Aged Out    Meningococcal (ACWY) vaccine  Aged Out       Hemoglobin A1C (%)   Date Value   06/21/2022 8.4   04/08/2021 6.4   05/04/2020 7.0 (H)             ( goal A1C is < 7)   Microalb/Crt.  Ratio (mcg/mg creat)   Date Value   11/05/2019 13     LDL Cholesterol (mg/dL)   Date Value   12/03/2022 83       (goal LDL is <100)   AST (U/L)   Date Value   10/23/2018 27     ALT (U/L)   Date Value   10/23/2018 22     BUN (mg/dL)   Date Value   12/03/2022 27 (H)     BP Readings from Last 3 Encounters:   01/11/23 (!) 140/73   12/13/22 117/62   06/21/22 (!) 127/54          (goal 120/80)    All Future Testing planned in [facility]  Lab Frequency Next Occurrence   Protein / Creatinine Ratio, Urine Once 12/07/2022   Sodium, Urine, Random Once 11/99/4313   Basic Metabolic Panel Once 83/00/8094   MRI LUMBAR SPINE WO CONTRAST Once 01/11/2023         Patient Active Problem List:     S/P total knee arthroplasty     Type 2 diabetes mellitus with diabetic nephropathy, without long-term current use of insulin ([facility] Hypertension     Asthma     Arthritis     Acquired trigger finger     Osteoarthritis of knee     Morbid obesity with BMI of 40.0-44.9, adult (HCC)     Current mild episode of major depressive disorder without prior episode ([facility] [address] [facility]     Bilateral carotid artery stenosis     Other specified glaucoma     Chronic systolic (congestive) heart failure     Lumbar radiculopathy     Lumbar degenerative disc disease     Lumbar spondylosis

## 2023-01-13 ENCOUNTER — HOSPITAL ENCOUNTER (OUTPATIENT)
Dept: MRI IMAGING | Facility: CLINIC | Age: 78
End: 2023-01-13
Payer: MEDICARE

## 2023-01-13 DIAGNOSIS — M54.16 LUMBAR RADICULOPATHY: ICD-10-CM

## 2023-01-13 PROCEDURE — G1010 CDSM STANSON: HCPCS

## 2023-01-17 ENCOUNTER — OFFICE VISIT (OUTPATIENT)
Dept: PULMONOLOGY | Age: 78
End: 2023-01-17

## 2023-01-17 VITALS
HEIGHT: 60 IN | OXYGEN SATURATION: 97 % | SYSTOLIC BLOOD PRESSURE: 123 MMHG | BODY MASS INDEX: 32.79 KG/M2 | DIASTOLIC BLOOD PRESSURE: 71 MMHG | WEIGHT: 167 LBS | HEART RATE: 96 BPM

## 2023-01-17 DIAGNOSIS — J45.30 MILD PERSISTENT ASTHMA WITHOUT COMPLICATION: Primary | ICD-10-CM

## 2023-01-17 DIAGNOSIS — J30.9 ALLERGIC RHINITIS, UNSPECIFIED SEASONALITY, UNSPECIFIED TRIGGER: ICD-10-CM

## 2023-01-17 DIAGNOSIS — R06.09 DYSPNEA ON EXERTION: ICD-10-CM

## 2023-01-17 DIAGNOSIS — G47.33 OBSTRUCTIVE SLEEP APNEA: ICD-10-CM

## 2023-01-17 RX ORDER — FLUTICASONE PROPIONATE 110 UG/1
2 AEROSOL, METERED RESPIRATORY (INHALATION) 2 TIMES DAILY
Qty: 1 EACH | Refills: 11 | Status: SHIPPED | OUTPATIENT
Start: 2023-01-17

## 2023-01-17 RX ORDER — FLUTICASONE PROPIONATE 50 MCG
2 SPRAY, SUSPENSION (ML) NASAL DAILY
Qty: 1 EACH | Refills: 11 | Status: SHIPPED | OUTPATIENT
Start: 2023-01-17

## 2023-01-17 ASSESSMENT — SLEEP AND FATIGUE QUESTIONNAIRES
HOW LIKELY ARE YOU TO NOD OFF OR FALL ASLEEP WHILE SITTING INACTIVE IN A PUBLIC PLACE: 0
ESS TOTAL SCORE: 3
HOW LIKELY ARE YOU TO NOD OFF OR FALL ASLEEP WHILE LYING DOWN TO REST IN THE AFTERNOON WHEN CIRCUMSTANCES PERMIT: 3
HOW LIKELY ARE YOU TO NOD OFF OR FALL ASLEEP WHILE SITTING AND READING: 0
HOW LIKELY ARE YOU TO NOD OFF OR FALL ASLEEP IN A CAR, WHILE STOPPED FOR A FEW MINUTES IN TRAFFIC: 0
HOW LIKELY ARE YOU TO NOD OFF OR FALL ASLEEP WHEN YOU ARE A PASSENGER IN A CAR FOR AN HOUR WITHOUT A BREAK: 0
HOW LIKELY ARE YOU TO NOD OFF OR FALL ASLEEP WHILE WATCHING TV: 0
HOW LIKELY ARE YOU TO NOD OFF OR FALL ASLEEP WHILE SITTING AND TALKING TO SOMEONE: 0
HOW LIKELY ARE YOU TO NOD OFF OR FALL ASLEEP WHILE SITTING QUIETLY AFTER LUNCH WITHOUT ALCOHOL: 0

## 2023-01-17 NOTE — PROGRESS NOTES
Pulmonary function test.  Date of study 01/17/2023. Spirometry shows FVC is 1.52 67% predicted. FEV1 is 1.15 65% predicted consistent with mild reduction in FEV1. Postbronchodilator FEV1 is 1.29 73% which is 12% improvement postbronchodilator consistent with bronchospastic/airway reversibility. Lung volume shows total lung capacity is 4.22 107% predicted which is within normal limit. Residual volume is 2.41 144% predicted which is consistent with mild to moderate airway trapping. Diffusion capacity is 12.3 478% predicted which is borderline reduced. Impression: This pulmonary function test is consistent with mild obstructive mental impairment with significant spots of bronchodilator consistent with airway reversibility/bronchial spasticity. Lung volumes consistent with mild to moderate airway trapping. Diffusion capacity is borderline reduced to normal.  Clinical collection is recommended.

## 2023-01-17 NOTE — PROGRESS NOTES
PULMONARY OUTPATIENT PROGRESS NOTE      Patient:  Raghavendra Anthony  YOB: 1945    MRN: [id number]     Acct:        Pt seen and Chart reviewed. Mr/Ms Cl Collado is here in followup for    Diagnosis   1. Obstructive sleep apnea    2. Mild persistent asthma without complication    3. Dyspnea on exertion    4. Allergic rhinitis, unspecified seasonality, unspecified trigger      She is here for follow-up of asthma, history of mild persistent asthma, allergic rhinitis, history of obstructive sleep apnea  Since she was seen last time she had not had asthma exacerbation or antibiotic use. Since she was seen last time she did not have exacerbation no steroids or antibiotic use. She does not complain of cough denies daily or persistent cough and denies wheezing. She has only occasional cough at night otherwise denies nocturnal awakening with shortness of breath wheezing. She does not complain of shortness of breath on regular activities she has shortness of breath on exertional activities or a pill task she is able to do her regular activities her activities are limited because of her pain in hips and she is followed by pain management. Her postnasal drip is stable and she use Flonase nasal spray. She is using Flovent according to patient 2 puff twice daily she does not think that she need refills at this time. Albuterol uses occasionally according to patient she had not used albuterol for some time now. She had history of intolerance/noncompliance with CPAP she has not been using CPAP for some time although she does have tiredness when she wake up in the morning sleep is not refreshing she take naps sometimes 1-1/2 hours but according patient her sleep has not been good at night because of her pain she toss and turn could not find comfortable position to sleep because of hip pain and feels tired during the daytime.     She has pulmonary function test done today which shows postbronchodilator FEV1 is 73% and there is 12% improvement she is airway trapping present with residual volume of 144% borderline reduction in diffusion capacity of 78% normal total lung capacity. Previous work-up  She had an echocardiogram done on 05/20/2021 which shows normal LV function grade 1 diastolic dysfunction normal RV systolic function. CXR 11/27/19 no acute or chronic changes. PFTs in December 2019 showed FEV1 82% and DLCO mildly reduced to 74% and normal lung volumes.     Used to smoke 1-2 PPD and quit 22 years ago and smoked for 30 years    Subjective:   Review of Systems -   General ROS: negative for weight loss, weight gain, fever or night sweats  ENT ROS: Positive for - nasal congestion and nasal discharge and postnasal drip  Allergy and Immunology ROS: Positive for - nasal congestion, postnasal drip   Respiratory ROS: Positive for -occasional cough and shortness of breath on exertion , negative for - hemoptysis, orthopnea, sputum changes or wheezing  Cardiovascular ROS: positive for - dyspnea on exertion, negative for - chest pain, loss of consciousness, orthopnea or paroxysmal nocturnal dyspnea  Gastrointestinal ROS: no abdominal pain, change in bowel habits, or black or bloody stools  Musculoskeletal ROS: negative for joints pain, joint stiffness and swelling  : no hematuria and no dysuria and nocturia  CNS: no headache, dizziness or weakness, diplopia, vertigo, numbness and tingling  Hem/Onc: no easy bleeding and bruising and petechia  Skin: no rash or skin lesions    Positive for snoring, excessive daytime sleepiness, falling asleep while watching television, disrupted sleep, naps    Sleep Medicine 1/17/2023 5/21/2021 6/5/2020 12/3/2019 9/26/2018 9/14/2016   Sitting and reading 0 0 0 0 0 0   Watching TV 0 0 0 0 0 0   Sitting, inactive in a public place (e.g. a theatre or a meeting) 0 0 0 1 0 0   As a passenger in a car for an hour without a break 0 0 0 0 0 0 Lying down to rest in the afternoon when circumstances permit 3 2 3 3 3 3   Sitting and talking to someone 0 0 0 0 0 0   Sitting quietly after a lunch without alcohol 0 0 0 0 0 0   In a car, while stopped for a few minutes in traffic 0 0 0 0 0 0   [place] Sleepiness Score 3 2 3 4 3 3       Allergies: Allergies   Allergen Reactions    Aspirin Other (See Comments)       Medications:  Outpatient Encounter Medications as of 1/17/2023   Medication Sig Dispense Refill    methylPREDNISolone (MEDROL DOSEPACK) 4 MG tablet Take by mouth. 1 kit 0    valsartan-hydroCHLOROthiazide (DIOVAN-HCT) 160-12.5 MG per tablet TAKE ONE TABLET BY MOUTH DAILY 90 tablet 0    simvastatin (ZOCOR) 40 MG tablet TAKE ONE TABLET BY MOUTH ONCE NIGHTLY 90 tablet 2    glimepiride (AMARYL) 4 MG tablet Take 1 tablet by mouth every morning (before breakfast) 90 tablet 2    citalopram (CELEXA) 40 MG tablet TAKE ONE TABLET BY MOUTH DAILY 90 tablet 3    metFORMIN (GLUCOPHAGE) 1000 MG tablet TAKE ONE TABLET BY MOUTH TWICE A DAY WITH MEALS 180 tablet 4    Latanoprost 0.005 % EMUL latanoprost 0.005 % eye drops      fluticasone (FLONASE) 50 MCG/ACT nasal spray 2 sprays by Nasal route daily 1 Bottle 11    albuterol sulfate  (90 Base) MCG/ACT inhaler Inhale 2 puffs into the lungs every 6 hours as needed for Wheezing 1 Inhaler 11    fluticasone (FLOVENT HFA) 110 MCG/ACT inhaler Inhale 2 puffs into the lungs 2 times daily 1 Inhaler 11    acetaminophen (TYLENOL) 500 MG tablet Take 500 mg by mouth      aspirin 81 MG tablet Take 81 mg by mouth daily      traMADol (ULTRAM) 50 MG tablet TAKE ONE TABLET BY MOUTH TWICE A DAY AS NEEDED FOR PAIN FOR UP TO 7 DAYS *MAX TWO TABLETS DAILY* (Patient not taking: Reported on 1/17/2023) 15 tablet 0    gabapentin (NEURONTIN) 100 MG capsule Take 1 capsule by mouth nightly for 30 days. 30 capsule 1     No facility-administered encounter medications on file as of 1/17/2023.          Objective:    Physical Exam:  Vitals:   BP 123/71 (Site: Left Upper Arm, Position: Sitting)   Pulse 96   Ht 5' (1.524 m)   Wt 167 lb (75.8 kg)   SpO2 97% Comment: ra  BMI 32.61 kg/m²   Last 3 weights: Wt Readings from Last 3 Encounters:   01/17/23 167 lb (75.8 kg)   01/11/23 172 lb (78 kg)   01/13/23 172 lb (78 kg)     Body mass index is 32.61 kg/m². Physical Examination:   General appearance - alert, well appearing, and in no distress  Mental status - alert, oriented to person, place, and time  Eyes - pupils equal and reactive, extraocular eye movements intact  Nose - normal and patent, no erythema, discharge or polyps, mild pallor, and no stigmata of bleeding  Mouth - mucous membranes moist, pharynx normal without lesions, MP III ,small oropharynx  Neck - supple, no significant adenopathy, short and thick. Chest -no retractions or cyanosis or accessory muscles use, bilateral symmetrical chest movement normal resonance on percussion, air entry is present bilaterally present, no expiratory wheezing no rhonchi no crackles.   Heart - normal rate, regular rhythm, normal S1, S2, no murmurs, rubs, clicks or gallops  Abdomen - soft, nontender, nondistended, no masses or organomegaly  Neurological - alert, oriented, normal speech, no focal findings or movement disorder noted  Extremities - peripheral pulses normal, no pedal edema, no clubbing or cyanosis  Skin - normal coloration and turgor, no rashes, no suspicious skin lesions noted     Labs:  None    CBC:   WBC   Date Value Ref Range Status   12/03/2022 7.5 3.5 - 11.3 k/uL Final     Hemoglobin   Date Value Ref Range Status   12/03/2022 9.1 (L) 11.9 - 15.1 g/dL Final     Platelets   Date Value Ref Range Status   12/03/2022 213 138 - 453 k/uL Final     BMP:   Sodium   Date Value Ref Range Status   12/03/2022 143 135 - 144 mmol/L Final     Potassium   Date Value Ref Range Status   12/03/2022 4.1 3.7 - 5.3 mmol/L Final     Chloride   Date Value Ref Range Status   12/03/2022 107 98 - 107 mmol/L Final CO2   Date Value Ref Range Status   12/03/2022 18 (L) 20 - 31 mmol/L Final     BUN   Date Value Ref Range Status   12/03/2022 27 (H) 8 - 23 mg/dL Final     Creatinine   Date Value Ref Range Status   12/03/2022 1.46 (H) 0.50 - 0.90 mg/dL Final   09/10/2020 1.21 (H) 0.50 - 0.90 mg/dL Final   05/04/2020 1.24 (H) 0.50 - 0.90 mg/dL Final     Glucose   Date Value Ref Range Status   12/03/2022 154 (H) 70 - 99 mg/dL Final   09/30/2011 85 74 - 106 mg/dL Final     S. Calcium:   Calcium   Date Value Ref Range Status   12/03/2022 9.5 8.6 - 10.4 mg/dL Final     S.  Ionized Calcium: No results found for: IONCA  S. Magnesium: No results found for: MG  S. Phosphorus:   Phosphorus   Date Value Ref Range Status   01/28/2014 2.7 2.5 - 4.5 mg/dL Final     S. Glucose:   POC Glucose   Date Value Ref Range Status   01/27/2014 153 (H) 65 - 105 mg/dL Final   01/27/2014 112 (H) 65 - 105 mg/dL Final   01/27/2014 116 (H) 65 - 105 mg/dL Final     Glycosylated hemoglobin A1C:   Hemoglobin A1C   Date Value Ref Range Status   06/21/2022 8.4 % Final       Pulmonary Functions Testing Results:    12/3/2019: FEV1 1.50 82%, FVC 2.01 87%, FEV1 FVC 94%, TLC 4.06 103%, DLCO 11.93 74%,  3/19/2014: FEV1 1.63  96%, FVC 2.16  89%, FDO4WAS 107 %, TLC 4.34  108% , DLCO 16.49 98%    Blood Gases: No results found for: PH, PCO2, PO2, HCO3, O2SAT    Radiological reports:    CXR  11/27/19  normal chest X-ray in 2014    CT Scans        ECHO:     Immunization History   Administered Date(s) Administered    COVID-19, PFIZER PURPLE top, DILUTE for use, (age 15 y+), 30mcg/0.3mL 01/27/2021, 02/17/2021    Influenza, AFLURIA (age 1 yrs+), FLUZONE, (age 10 mo+), MDV, 0.5mL 09/20/2017, 09/26/2018, 11/07/2019    Influenza, FLUARIX, FLULAVAL, FLUZONE (age 10 mo+) AND AFLURIA, (age 1 y+), PF, 0.5mL 12/13/2022    Influenza, Triv, 3 Years and older, IM (Afluria (5 yrs and older) 09/14/2016    Pneumococcal Conjugate 13-valent ([id number]) 10/26/2016    Pneumococcal Polysaccharide ([id number]) 10/08/2008, 03/27/2017    Tetanus Toxoid, absorbed 11/11/2004       Assessment:      1. Obstructive sleep apnea   2. Mild persistent asthma without complication   3. Dyspnea on exertion   4. Allergic rhinitis, unspecified seasonality, unspecified trigger     She has history of chronic dyspnea on exertion her pulmonary function test almost 1.5 years ago in December 2019 showed normal FEV1 and FVC, normal lung volume and had mild increase in residual volume and shows mild reduction diffusion capacity, she did have history of a smoking for more than 30 years and likely early emphysema or pulmonary hypertension could be the cause of mild reduction diffusion capacity which was normal in 2014, her last chest x-ray on 11/27/2019 did not show any acute or chronic changes, she had stop smoking more than 22 years ago. Pulmonary function test done 01/17/2023 showed mild reduction in FEV1 with airway reversibility with 12% improvement and also airway trapping on lung volume. I have discussed with patient to continue with Flovent be compliant with Flovent. She may need additional bronchodilator with Flovent although she denies significant complaint at this time and denies chronic cough wheezing or shortness of breath on regular activities. Plan:    Pulmonary function test results seen 01/70/23 and compared to 2019 there is reduction in [address] reduction and airway trapping is present which was present before and also stable diffusion capacity  Encourage increase activity and exercise and weight loss   Continue Flovent at current [id number] 2 puff twice a day  Refills provided  Albuterol to be used as needed  Continue Flonase nasal spray  Claritin as needed    Flu vaccine annually.  She had Flu vaccine  She had Prevnar 13 and up to date with Pneumovax from pulm perspective  She had Covid vaccine both the doses and booster  Maintain an active lifestyle    Wt loss is recommended and discussed  Follow good sleep hygeine instructions  She was not compliant with cpap and does not have cpap any more and and not interested in using CPAP    RTC in 6 months      Lieutenant Masha MD, MD             1/17/2023, 9:08 AM     Please note that this chart was generated using voice recognition Dragon dictation software. Although every effort was made to ensure the accuracy of this automated transcription, some errors in transcription may have occurred.

## 2023-01-18 ENCOUNTER — HOSPITAL ENCOUNTER (OUTPATIENT)
Dept: PAIN MANAGEMENT | Age: 78
Discharge: HOME | End: 2023-01-18
Payer: MEDICARE

## 2023-01-18 VITALS
HEART RATE: 100 BPM | HEIGHT: 60 IN | DIASTOLIC BLOOD PRESSURE: 65 MMHG | OXYGEN SATURATION: 97 % | TEMPERATURE: 97.3 F | BODY MASS INDEX: 33.77 KG/M2 | WEIGHT: 172 LBS | SYSTOLIC BLOOD PRESSURE: 142 MMHG

## 2023-01-18 DIAGNOSIS — M47.816 LUMBAR SPONDYLOSIS: ICD-10-CM

## 2023-01-18 DIAGNOSIS — M51.36 LUMBAR DEGENERATIVE DISC DISEASE: ICD-10-CM

## 2023-01-18 DIAGNOSIS — M54.16 LUMBAR RADICULOPATHY: Primary | ICD-10-CM

## 2023-01-18 PROCEDURE — 99213 OFFICE O/P EST LOW 20 MIN: CPT

## 2023-01-18 PROCEDURE — 99214 OFFICE O/P EST MOD 30 MIN: CPT | Performed by: STUDENT IN AN ORGANIZED HEALTH CARE EDUCATION/TRAINING PROGRAM

## 2023-01-18 ASSESSMENT — PAIN SCALES - GENERAL: PAINLEVEL_OUTOF10: 10

## 2023-01-18 NOTE — PROGRESS NOTES
Chronic Pain Clinic Note     Encounter Date: 1/18/2023     SUBJECTIVE:  Chief Complaint   Patient presents with    Back Pain     MRI results       History of Present Illness:   Kallie Collado is a 68 y.o. female who presents with back pain    Medication Refill: n/a    Current Complaints of Pain:   Location: back   Radiation: both legs  Severity:  Severe  Pain Numerical Score -    Average: 10     Highest: 10  Lowest: 0  Character/Quality: Complains of pain that is aching and burning  Timing: Intermittent  Associated symptoms: none  Numbness: yes  Weakness: yes  Exacerbating factors: standing, walking  Alleviating factors: sitting  Length of time pain has been present: Started about 3 months ago  Inciting event/injury: no  Bowel/Bladder incontinence: no  Falls: no  Physical Therapy: no    History of Interventions:   Surgery: 1 - back  Injections: None    Imaging:    MRI Lumbar 01/13/2023    FINDINGS:   BONES/ALIGNMENT: Lumbar levoscoliosis (apex L2-L3). No significant   spondylolisthesis. Vertebral body heights appear preserved. Marrow signal   appears within normal limits. No evidence of acute fracture or aggressive   appearing osseous lesions. Multilevel intervertebral disc space narrowing. SPINAL CORD: The conus terminates normally. SOFT TISSUES: No paraspinal mass identified. L1-L2: Disc osteophyte complex. Ligamentum flavum thickening. Facet   hypertrophy. Mild spinal canal stenosis. Mild bilateral foraminal narrowing. L2-L3: Suspected prior laminectomy changes. Disc osteophyte complex. Facet   hypertrophy. No significant spinal canal stenosis. Mild/moderate right and   mild left foraminal narrowing. L3-L4: Disc osteophyte complex. Ligamentum flavum thickening. Facet   hypertrophy. Moderate spinal canal stenosis. Moderate right and mild left   foraminal narrowing. L4-L5: Disc osteophyte complex. Ligamentum thickening.   Facet hypertrophy   with a left-sided 0.8 cm synovial cyst.  Severe spinal canal stenosis.  Mild   right and moderate left foraminal narrowing.       L5-S1: Disc bulge.  Facet hypertrophy.  No significant spinal canal stenosis.   Moderate left foraminal narrowing.       Past Medical History:   Diagnosis Date    Acquired trigger finger 4/27/2018    ROSELIA (acute kidney injury) ([facility]) 2011    Allergic rhinitis     Anesthesia complication     after or bp dropped kidney labs elevated for few years    Anxiety     Arthritis     Asthma     Carotid stenosis, asymptomatic     16-49%    CKD (chronic kidney disease) stage 3, GFR 30-59 ml/min ([facility])     Diabetes mellitus ([facility])     Dry skin     Dyspnea     Frequency of urination     History of bronchitis     Hyperkalemia 1/24/2014    Hypertension     Knee pain, left     Obesity     Obsessive compulsive disorder     Osteoarthritis of knee 4/27/2018    Post-nasal drip     S/P total knee arthroplasty 1/24/2014    Sleep apnea     Urgency incontinence     Urinary, incontinence, stress female     Wears glasses     Wears partial dentures     upper       Past Surgical History:   Procedure Laterality Date    BACK SURGERY      CARPAL TUNNEL RELEASE Bilateral     10 yrs ago    CATARACT REMOVAL WITH IMPLANT Bilateral     COLONOSCOPY  7/1/2021    COLONOSCOPY POLYPECTOMY HOT BIOPSY performed by Eric Mccormack MD at [facility]    COLONOSCOPY  7/1/2021    COLONOSCOPY W/ ENDOSCOPIC MUCOSAL RESECTION performed by Eric Mccormack MD at [facility]    COLONOSCOPY  7/1/2021    COLONOSCOPY WITH BIOPSY performed by Eric Mccormack MD at [facility]    DILATION AND CURETTAGE OF UTERUS      EYE SURGERY      JOINT REPLACEMENT      LUMBAR DISCECTOMY      TONSILLECTOMY      TOTAL KNEE ARTHROPLASTY Right     TOTAL KNEE ARTHROPLASTY      TOTAL KNEE ARTHROPLASTY Left 01/23/2014       Family History   Problem Relation Age of Onset    Heart Disease Mother     Heart Disease Brother     Stroke Other     Colon Cancer Other      Coronary Art Dis Other        Social History     Socioeconomic History    Marital status:      Spouse name: Not on file    Number of children: Not on file    Years of education: Not on file    Highest education level: Not on file   Occupational History    Not on file   Tobacco Use    Smoking status: Former     Packs/day: 1.00     Types: Cigarettes     Quit date: 1998     Years since quittin.0    Smokeless tobacco: Never   Vaping Use    Vaping Use: Never used   Substance and Sexual Activity    Alcohol use: Yes     Comment: occas    Drug use: No    Sexual activity: Not on file   Other Topics Concern    Not on file   Social History Narrative    Not on file     Social Determinants of Health     Financial Resource Strain: Low Risk     Difficulty of Paying Living Expenses: Not hard at all   Food Insecurity: No Food Insecurity    Worried About [address] [employer] in the Last Year: Never true    [address] in the Last Year: Never true   Transportation Needs: No Transportation Needs    Lack of Transportation (Medical): No    Lack of Transportation (Non-Medical):  No   Physical Activity: Not on file   Stress: Not on file   Social Connections: Not on file   Intimate Partner Violence: Not on file   Housing Stability: Not on file       Medications & Allergies:   Current Outpatient Medications   Medication Instructions    acetaminophen (TYLENOL) 500 mg, Oral    albuterol sulfate  (90 Base) MCG/ACT inhaler 2 puffs, Inhalation, EVERY 6 HOURS PRN    aspirin 81 mg, Oral, DAILY    citalopram (CELEXA) 40 MG tablet TAKE ONE TABLET BY MOUTH DAILY    fluticasone (FLONASE) 50 MCG/ACT nasal spray 2 sprays, Nasal, DAILY    fluticasone (FLOVENT HFA) 110 MCG/ACT inhaler 2 puffs, Inhalation, 2 TIMES DAILY    gabapentin (NEURONTIN) 100 mg, Oral, NIGHTLY    glimepiride (AMARYL) 4 mg, Oral, DAILY BEFORE BREAKFAST    Latanoprost 0.005 % EMUL latanoprost 0.005 % eye drops    metFORMIN (GLUCOPHAGE) 1000 MG tablet TAKE ONE TABLET BY MOUTH TWICE A DAY WITH MEALS    methylPREDNISolone (MEDROL DOSEPACK) 4 MG tablet Take by mouth. simvastatin (ZOCOR) 40 MG tablet TAKE ONE TABLET BY MOUTH ONCE NIGHTLY    traMADol (ULTRAM) 50 MG tablet TAKE ONE TABLET BY MOUTH TWICE A DAY AS NEEDED FOR PAIN FOR UP TO 7 DAYS *MAX TWO TABLETS DAILY*    valsartan-hydroCHLOROthiazide (DIOVAN-HCT) 160-12.5 MG per tablet TAKE ONE TABLET BY MOUTH DAILY       Allergies   Allergen Reactions    Aspirin Other (See Comments)       Review of Systems:   Constitutional: negative for weight changes or fevers  Cardiovascular: negative for chest pain, palpitations, irregular heart beat  Respiratory: negative for dyspnea, cough, wheezing  Gastrointestinal: negative for constipation, diarrhea, nausea  Genitourinary: negative for bowel/bladder incontinence   Musculoskeletal: positive for low back pain  Neurological: Positive for radicular leg pain, leg weakness or numbness/tingling  Behavioral/Psych: negative for anxiety/depression   Hematological: negative for abnormal bleeding, anticoagulation use or antiplatelet use  All other systems reviewed and are negative    OBJECTIVE:    Vitals:    01/18/23 0947   BP: (!) 142/65   Pulse: 100   Temp: 97.3 °F (36.3 °C)   SpO2: 97%         PHYSICAL EXAM    GENERAL: No acute distress, pleasant, well-appearing  HEENT: Normocephalic, atraumatic, Pupils equal and round  CARDIOVASCULAR: Well perfused, No peripheral cyanosis  PULMONARY: Good chest wall excursion, breathing unlabored  PSYCH: Appropriate affect and insight, non-pressured speech  SKIN: No rashes or lesions  MUSCULOSKELETAL:  Inspection: The back and extremities are symmetric and aligned. Muscle bulk is normal in appearance.   Palpation: There is tenderness to palpation along the lumbar paraspinal musculature bilaterally  Lumbar range of motion is full  NEUROMUSCULAR:  Patient ambulates unassisted  Gait is nonantalgic  Sensation to light touch is intact in lower extremities  Strength is full in lower extremities  No ankle clonus    Special Tests:  Lumbar facet loading is positive bilaterally  Seated straight leg raise is positive on right      DIAGNOSIS:    ICD-10-CM    1. Lumbar radiculopathy  M54.16 TRANSFORMINAL LUMBAR SINGLE      2. Lumbar degenerative disc disease  M51.36       3. Lumbar spondylosis  M47.816              ASSESSMENT:    Josee Collado is a 68 y. o.female who presents with chronic low back pain and right leg pain. To review, patient noticed symptoms 6 months ago without injuries or trauma. She has a history of previous L2-3 lumbar laminectomy. The patient's history and physical examination are consistent with lumbar radiculopathy as the patient has pain starting in the low back radiating down into the right leg. Patient has positive neural tension signs on examination with a positive seated straight leg raise. Her lumbar MRI on 1/13/2023 reveals multilevel degenerative changes with multilevel neural foraminal stenosis and spinal canal stenosis. Therefore, I will plan for bilateral lumbar L5 transforaminal epidural steroid injections. Neurologically, it appears the patient has full strength and normal sensation. There is no evidence of myelopathy on examination. There are no red flags in the patient's history. The patient has failed conservative measures including greater than 3 medications for pain relief, a self-directed therapy program, as well as activity modification all within the last 6 weeks over 3 months. The patient's pain has been causing worsening quality of life and function. PLAN:  Medications:     For nonopioid therapy, the following medications were prescribed:    -Continue medication prescribed by primary care physician    Opioid therapy:  -Not indicated    Interventions:  -Plan for bilateral lumbar L5 transforaminal epidural steroid injections  -Diabetic  -Hold aspirin 7 days prior to procedure    Imaging:  -Reviewed lumbar MRI with patient in room    Behavioral Therapies:  -Continue daily stretching and home exercise program    Referrals:  -Continue physical therapy    Follow-up Plan:  -After procedure    Patient was offered intervention where appropriate. Multi-modal Pain Therapy: The patient was explicitly considered for multimodal and interdisciplinary therapy. Non-opioid and non-pharmacological opportunities to enhance analgesia and quality of life have been and will continue to be pursued.     Jared Hilliard, DO  Interventional Pain Management/PM&R   [facility]    Orders Placed This Encounter    TRANSFORMINAL LUMBAR SINGLE     Standing Status:   Future     Standing Expiration Date:   1/18/2024     Scheduling Instructions:      Bilateral lumbar L5 TFESI      Diabetic      Hold ASA 7 days prior to procedure

## 2023-01-23 ENCOUNTER — HOSPITAL ENCOUNTER (OUTPATIENT)
Dept: PHYSICAL THERAPY | Age: 78
Setting detail: THERAPIES SERIES
Discharge: HOME | End: 2023-01-23
Payer: MEDICARE

## 2023-01-23 PROCEDURE — 97110 THERAPEUTIC EXERCISES: CPT

## 2023-01-23 PROCEDURE — 97162 PT EVAL MOD COMPLEX 30 MIN: CPT

## 2023-01-25 DIAGNOSIS — M19.90 ARTHRITIS: ICD-10-CM

## 2023-01-25 DIAGNOSIS — Z96.651 STATUS POST TOTAL RIGHT KNEE REPLACEMENT: ICD-10-CM

## 2023-01-26 RX ORDER — TRAMADOL HYDROCHLORIDE 50 MG/1
TABLET ORAL
Qty: 15 TABLET | Refills: 0 | Status: ON HOLD | OUTPATIENT
Start: 2023-01-26 | End: 2023-02-05

## 2023-01-26 NOTE — TELEPHONE ENCOUNTER
Patient [facility] 1/31/2023
Please call patient to schedule an appointment.
Metabolic Panel Once 11/29/6499   TRANSFORMINAL LUMBAR SINGLE Once 01/18/2023               Patient Active Problem List:     S/P total knee arthroplasty     Type 2 diabetes mellitus with diabetic nephropathy, without long-term current use of insulin ([facility])     Hypertension     Asthma     Arthritis     Acquired trigger finger     Osteoarthritis of knee     Morbid obesity with BMI of 40.0-44.9, adult ([facility]     Current mild episode of major depressive disorder without prior episode ([facility] [address]     Bilateral carotid artery stenosis     Other specified glaucoma     Chronic systolic (congestive) heart failure     Lumbar radiculopathy     Lumbar degenerative disc disease     Lumbar spondylosis

## 2023-01-27 ENCOUNTER — APPOINTMENT (OUTPATIENT)
Dept: PHYSICAL THERAPY | Age: 78
End: 2023-01-27
Payer: MEDICARE

## 2023-01-31 ENCOUNTER — OFFICE VISIT (OUTPATIENT)
Dept: INTERNAL MEDICINE | Age: 78
End: 2023-01-31
Payer: MEDICARE

## 2023-01-31 VITALS
OXYGEN SATURATION: 98 % | TEMPERATURE: 97.1 F | HEART RATE: 110 BPM | WEIGHT: 166.8 LBS | DIASTOLIC BLOOD PRESSURE: 66 MMHG | BODY MASS INDEX: 32.75 KG/M2 | HEIGHT: 60 IN | SYSTOLIC BLOOD PRESSURE: 109 MMHG

## 2023-01-31 DIAGNOSIS — I50.32 CHRONIC DIASTOLIC CONGESTIVE HEART FAILURE (HCC): ICD-10-CM

## 2023-01-31 DIAGNOSIS — F32.0 CURRENT MILD EPISODE OF MAJOR DEPRESSIVE DISORDER WITHOUT PRIOR EPISODE (HCC): ICD-10-CM

## 2023-01-31 DIAGNOSIS — E11.21 TYPE 2 DIABETES MELLITUS WITH DIABETIC NEPHROPATHY, WITHOUT LONG-TERM CURRENT USE OF INSULIN (HCC): Primary | ICD-10-CM

## 2023-01-31 PROBLEM — N18.30 CHRONIC RENAL DISEASE, STAGE III (HCC): Status: ACTIVE | Noted: 2023-01-31

## 2023-01-31 LAB — HBA1C MFR BLD: 6.5 %

## 2023-01-31 PROCEDURE — 83036 HEMOGLOBIN GLYCOSYLATED A1C: CPT | Performed by: INTERNAL MEDICINE

## 2023-01-31 PROCEDURE — 99214 OFFICE O/P EST MOD 30 MIN: CPT | Performed by: INTERNAL MEDICINE

## 2023-01-31 PROCEDURE — 3044F HG A1C LEVEL LT 7.0%: CPT | Performed by: INTERNAL MEDICINE

## 2023-01-31 PROCEDURE — G8427 DOCREV CUR MEDS BY ELIG CLIN: HCPCS | Performed by: INTERNAL MEDICINE

## 2023-01-31 PROCEDURE — G8399 PT W/DXA RESULTS DOCUMENT: HCPCS | Performed by: INTERNAL MEDICINE

## 2023-01-31 PROCEDURE — 3078F DIAST BP <80 MM HG: CPT | Performed by: INTERNAL MEDICINE

## 2023-01-31 PROCEDURE — G8417 CALC BMI ABV UP PARAM F/U: HCPCS | Performed by: INTERNAL MEDICINE

## 2023-01-31 PROCEDURE — 1090F PRES/ABSN URINE INCON ASSESS: CPT | Performed by: INTERNAL MEDICINE

## 2023-01-31 PROCEDURE — 3074F SYST BP LT 130 MM HG: CPT | Performed by: INTERNAL MEDICINE

## 2023-01-31 PROCEDURE — 1123F ACP DISCUSS/DSCN MKR DOCD: CPT | Performed by: INTERNAL MEDICINE

## 2023-01-31 PROCEDURE — G8482 FLU IMMUNIZE ORDER/ADMIN: HCPCS | Performed by: INTERNAL MEDICINE

## 2023-01-31 PROCEDURE — 1036F TOBACCO NON-USER: CPT | Performed by: INTERNAL MEDICINE

## 2023-01-31 ASSESSMENT — PATIENT HEALTH QUESTIONNAIRE - PHQ9
2. FEELING DOWN, DEPRESSED OR HOPELESS: 0
10. IF YOU CHECKED OFF ANY PROBLEMS, HOW DIFFICULT HAVE THESE PROBLEMS MADE IT FOR YOU TO DO YOUR WORK, TAKE CARE OF THINGS AT HOME, OR GET ALONG WITH OTHER PEOPLE: 0
4. FEELING TIRED OR HAVING LITTLE ENERGY: 3
7. TROUBLE CONCENTRATING ON THINGS, SUCH AS READING THE NEWSPAPER OR WATCHING TELEVISION: 0
5. POOR APPETITE OR OVEREATING: 0
3. TROUBLE FALLING OR STAYING ASLEEP: 1
SUM OF ALL RESPONSES TO PHQ QUESTIONS 1-9: 4
SUM OF ALL RESPONSES TO PHQ QUESTIONS 1-9: 4
9. THOUGHTS THAT YOU WOULD BE BETTER OFF DEAD, OR OF HURTING YOURSELF: 0
SUM OF ALL RESPONSES TO PHQ QUESTIONS 1-9: 4
8. MOVING OR SPEAKING SO SLOWLY THAT OTHER PEOPLE COULD HAVE NOTICED. OR THE OPPOSITE, BEING SO FIGETY OR RESTLESS THAT YOU HAVE BEEN MOVING AROUND A LOT MORE THAN USUAL: 0
6. FEELING BAD ABOUT YOURSELF - OR THAT YOU ARE A FAILURE OR HAVE LET YOURSELF OR YOUR FAMILY DOWN: 0
SUM OF ALL RESPONSES TO PHQ QUESTIONS 1-9: 4

## 2023-01-31 ASSESSMENT — ENCOUNTER SYMPTOMS
BACK PAIN: 1
EYES NEGATIVE: 1
RESPIRATORY NEGATIVE: 1
ALLERGIC/IMMUNOLOGIC NEGATIVE: 1
GASTROINTESTINAL NEGATIVE: 1

## 2023-01-31 NOTE — PROGRESS NOTES
[address] [facility]   Progress Note        Date of patient's visit: 1/31/2023  Patient's Name:  Asher Collado                   YOB: 1945        PCP:  Nina Oconnor MD    Dianne Nixon is a 68 y.o. female who presents for No chief complaint on file. and follow up of chronic medical problems. Patient Active Problem List   Diagnosis    S/P total knee arthroplasty    Type 2 diabetes mellitus with diabetic nephropathy, without long-term current use of insulin ([facility])    Hypertension    Asthma    Arthritis    Acquired trigger finger    Osteoarthritis of knee    Morbid obesity with BMI of 40.0-44.9, adult ([facility] [address]    Current mild episode of major depressive disorder without prior episode ([facility] [address]    Bilateral carotid artery stenosis    Other specified glaucoma    Chronic systolic (congestive) heart failure    Lumbar radiculopathy    Lumbar degenerative disc disease    Lumbar spondylosis       HISTORY OF PRESENT ILLNESS:    History was obtained from the patient. 68year old female here today for right sided pain which is now bilateral. She also reports right sided leg pain which she starting to affect gait and balance. She reports pain down the lateral aspect of thigh into knee. Initial imaging revealed-  Right knee: Status post knee arthroplasty. No acute osseous abnormality or   hardware complication. Pelvis and right hip: Mild degenerative changes in the hips. No acute   fracture or dislocation. Cervical spine: Grade 1 anterolisthesis C5 on C6. Degenerative and   degenerative disc changes. There left neural foraminal stenosis C3-C4. No   acute fracture, or prevertebral soft tissue swelling. Lumbar spine: Grade 1 retrolisthesis L3 on L4. Grade 1 anterolisthesis L1 on   L2. Advanced degenerative and degenerative disc changes with scoliotic   curvature without evidence of acute fracture. Her pain did not respond to Tylenol, with a combination of tramadol.   Patient was referred to physical therapy and pain management. A MRI was done which showed    1. Multilevel lumbar spondylosis, most notable at L4-L5 (left-sided 0.8 cm   synovial cyst, severe spinal canal stenosis). 2. Lumbar levoscoliosis (apex L2-L3). They will proceed with a nerve block per patient on Monday. She follows with vascular for Carotid artery stenosis and is suppose to obtain cta but is waiting to see nephrology. She has had a steady incline in Cr over years as far back as 2011 strongly suggestive for CKD. She had an echo in 2021, has LVDD as documented below. Denies SOB, orthopnea or LE swelling-  Left ventricle is normal in size, normal wall thickness, global left  ventricular systolic function is normal 55%. Evidence of mild (grade I) diastolic dysfunction. Right atrium is mildly dilated . Right ventricular dilatation with normal systolic function. Normal mitral valve structure. Mitral annular calcification is seen. Trivial mitral regurgitation. Depression is relatively controlled. Pain is a factor in mood currently. Patient continues to take metformin and A1C is 6.5. Patient's allergies, medications, past medical, surgical, social and family histories were reviewed and updated as appropriate. ALLERGIES      Allergies   Allergen Reactions    Aspirin Other (See Comments)         MEDICATIONS:      Current Outpatient Medications   Medication Sig Dispense Refill    traMADol (ULTRAM) 50 MG tablet TAKE ONE TABLET BY MOUTH TWICE A DAY AS NEEDED FOR PAIN FOR UP TO 7 DAYS *MAX TWO TABLETS DAILY* 15 tablet 0    fluticasone (FLOVENT HFA) 110 MCG/ACT inhaler Inhale 2 puffs into the lungs 2 times daily 1 each 11    fluticasone (FLONASE) 50 MCG/ACT nasal spray 2 sprays by Nasal route daily 1 each 11    methylPREDNISolone (MEDROL DOSEPACK) 4 MG tablet Take by mouth. 1 kit 0    gabapentin (NEURONTIN) 100 MG capsule Take 1 capsule by mouth nightly for 30 days.  30 capsule 1 valsartan-hydroCHLOROthiazide (DIOVAN-HCT) 160-12.5 MG per tablet TAKE ONE TABLET BY MOUTH DAILY 90 tablet 0    simvastatin (ZOCOR) 40 MG tablet TAKE ONE TABLET BY MOUTH ONCE NIGHTLY 90 tablet 2    glimepiride (AMARYL) 4 MG tablet Take 1 tablet by mouth every morning (before breakfast) 90 tablet 2    citalopram (CELEXA) 40 MG tablet TAKE ONE TABLET BY MOUTH DAILY 90 tablet 3    metFORMIN (GLUCOPHAGE) 1000 MG tablet TAKE ONE TABLET BY MOUTH TWICE A DAY WITH MEALS 180 tablet 4    Latanoprost 0.005 % EMUL latanoprost 0.005 % eye drops      albuterol sulfate  (90 Base) MCG/ACT inhaler Inhale 2 puffs into the lungs every 6 hours as needed for Wheezing 1 Inhaler 11    acetaminophen (TYLENOL) 500 MG tablet Take 500 mg by mouth      aspirin 81 MG tablet Take 81 mg by mouth daily       No current facility-administered medications for this visit.        Patient Care Team:  Katia Chapman MD as PCP - General (Internal Medicine)  Katia Chapman MD as PCP - [facility] Provider  Mierille Rachel MD as Consulting Physician (Pulmonology)    PAST MEDICAL AND SURGICAL HISTORY:      Past Medical History:   Diagnosis Date    Acquired trigger finger 4/27/2018    ROSELIA (acute kidney injury) ([facility] [address] 2011    Allergic rhinitis     Anesthesia complication     after or bp dropped kidney labs elevated for few years    Anxiety     Arthritis     Asthma     Carotid stenosis, asymptomatic     16-49%    CKD (chronic kidney disease) stage 3, GFR 30-59 ml/min ([facility])     Diabetes mellitus ([address]     Dry skin     Dyspnea     Frequency of urination     History of bronchitis     Hyperkalemia 1/24/2014    Hypertension     Knee pain, left     Obesity     Obsessive compulsive disorder     Osteoarthritis of knee 4/27/2018    Post-nasal drip     S/P total knee arthroplasty 1/24/2014    Sleep apnea     Urgency incontinence     Urinary, incontinence, stress female     Wears glasses     Wears partial dentures     upper     Past Surgical History:   Procedure Laterality Date    BACK SURGERY      CARPAL TUNNEL RELEASE Bilateral     10 yrs ago    CATARACT REMOVAL WITH IMPLANT Bilateral     COLONOSCOPY  2021    COLONOSCOPY POLYPECTOMY HOT BIOPSY performed by Abhishek Mejia MD at [facility]    COLONOSCOPY  2021    COLONOSCOPY W/ ENDOSCOPIC MUCOSAL RESECTION performed by Abhishek Mejia MD at [facility]    COLONOSCOPY  2021    COLONOSCOPY WITH BIOPSY performed by Abhishek Mejia MD at [facility] DISCECTOMY      TONSILLECTOMY      TOTAL KNEE ARTHROPLASTY Right     TOTAL KNEE ARTHROPLASTY      TOTAL KNEE ARTHROPLASTY Left 2014       SOCIAL HISTORY      Social History     Tobacco Use    Smoking status: Former     Packs/day: 1.00     Types: Cigarettes     Quit date: 1998     Years since quittin.0    Smokeless tobacco: Never   Substance Use Topics    Alcohol use: Yes     Comment: erickson Jay  reports that she quit smoking about 25 years ago. Her smoking use included cigarettes. She smoked an average of 1 pack per day. She has never used smokeless tobacco.    FAMILY HISTORY:      Family History   Problem Relation Age of Onset    Heart Disease Mother     Heart Disease Brother     Stroke Other     Colon Cancer Other     Coronary Art Dis Other        REVIEW OF SYSTEMS:    Review of Systems   Constitutional: Negative. HENT: Negative. Eyes: Negative. Respiratory: Negative. Cardiovascular: Negative. Gastrointestinal: Negative. Endocrine: Negative. Genitourinary: Negative. Musculoskeletal:  Positive for back pain. Skin: Negative. Allergic/Immunologic: Negative. Neurological:  Positive for light-headedness. Negative for dizziness, tremors, seizures, syncope, facial asymmetry, speech difficulty, weakness, numbness and headaches. Psychiatric/Behavioral: Negative.          PHYSICAL EXAM:      Vitals: 01/31/23 1019   BP: 109/66   Pulse: (!) 110   Temp: 97.1 °F (36.2 °C)   SpO2: 98%     BP Readings from Last 3 Encounters:   01/31/23 109/66   01/18/23 (!) 142/65   01/17/23 123/71       Physical Examination: General appearance - alert, well appearing, and in no distress  Mental status - alert, oriented to person, place, and time  Chest - clear to auscultation, no wheezes, rales or rhonchi, symmetric air entry  Heart - normal rate and regular rhythm  Musculoskeletal - no joint tenderness, deformity or swelling  Extremities - no pedal edema noted    LABORATORY FINDINGS:    CBC:   Lab Results   Component Value Date/Time    WBC 7.5 12/03/2022 11:33 AM    HGB 9.1 12/03/2022 11:33 AM     12/03/2022 11:33 AM     BMP:    Lab Results   Component Value Date/Time     12/03/2022 11:33 AM    K 4.1 12/03/2022 11:33 AM     12/03/2022 11:33 AM    CO2 18 12/03/2022 11:33 AM    BUN 27 12/03/2022 11:33 AM    CREATININE 1.46 12/03/2022 11:33 AM    GLUCOSE 154 12/03/2022 11:33 AM    GLUCOSE 85 09/30/2011 09:25 AM     Hemoglobin A1C:   Lab Results   Component Value Date/Time    LABA1C 6.5 01/31/2023 10:37 AM     Microalbumin Urine:   Lab Results   Component Value Date/Time    MICROALBUR 35 11/05/2019 09:33 AM     Lipid profile:   Lab Results   Component Value Date/Time    CHOL 140 05/04/2020 12:27 PM    TRIG 100 05/04/2020 12:27 PM    HDL 53 12/03/2022 11:33 AM     Thyroid functions:   Lab Results   Component Value Date/Time    TSH 1.22 12/03/2022 11:33 AM      Hepatic functions:   Lab Results   Component Value Date/Time    ALT 22 10/23/2018 01:14 PM    AST 27 10/23/2018 01:14 PM    PROT 7.5 10/23/2018 01:14 PM    BILITOT 0.45 10/23/2018 01:14 PM    LABALBU 4.2 10/23/2018 01:14 PM     Urine Analysis: No results found for: [address] [address]:      Health Maintenance Due   Topic Date Due    DTaP/Tdap/Td vaccine (1 - Tdap) Never done    Shingles vaccine (1 of 2) Never done    COVID-19 Vaccine (3 - Booster for Pfizer series) 04/14/2021    Annual Wellness Visit (AWV)  10/07/2021       ASSESSMENT AND PLAN:       Diagnosis Orders   1. Type 2 diabetes mellitus with diabetic nephropathy, without long-term current use of insulin (HCC)  POCT glycosylated hemoglobin (Hb A1C)      2. Chronic diastolic congestive heart failure (HCC)        3. Current mild episode of major depressive disorder without prior episode (HCC)        A1C is 6.5 and continue metformin  She is hopeful to receive epidural Monday from pain management. FOLLOW UP:   1. Priti Estrada received counseling on the following healthy behaviors: nutrition and exercise    2. Reviewed prior labs and health maintenance. 3.  Discussed use, benefit, and side effects of prescribed medications. Barriers to medication compliance addressed. All patient questions answered. Pt voiced understanding. 4.  Continue current medications, diet and exercise. No orders of the defined types were placed in this encounter. Completed Refills               Requested Prescriptions      No prescriptions requested or ordered in this encounter       5. Patient given educational materials - see patient instructions    6. Was a self-tracking handout given in paper form or via [employer]? Yes  If yes, see orders or list here. Orders Placed This Encounter   Procedures    POCT glycosylated hemoglobin (Hb A1C)       Return in about 4 weeks (around 2/28/2023). Patient voiced understanding and agreed to treatment plan. This note is created with the assistance of a speech-recognition program. While intending to generate a document that actually reflects the content of the visit, the document can still have some mistakes which may not have been identified and corrected by editing.       Dr Alli Rivera MD, [address]  Associate , Department of Internal Medicine  Resident Ambulatory Site Medical Director  [address] [facility] Internal Medicine  [facility]  Internal Medicine Clerkship - Julián Morales                   2/2/2023, 3:48 PM

## 2023-02-01 NOTE — TELEPHONE ENCOUNTER
Request for Metformin. Next Visit Date:  Future Appointments   Date Time Provider Eulalia Merlyn   2/6/2023  4:00 PM Jonatan Breen,  [address]   2/21/2023 10:15 AM Jonatan Breen, DO [address]   3/7/2023  3:00 PM Smita Manriquez MD [id number]   4/28/2023  9:10 AM Doreen Dye MD [facility] None   7/18/2023 10:15 AM Allan Jensen  [address] Maintenance   Topic Date Due    DTaP/Tdap/Td vaccine (1 - Tdap) Never done    Shingles vaccine (1 of 2) Never done    COVID-19 Vaccine (3 - Booster for Pfizer series) 04/14/2021    Annual Wellness Visit (AWV)  10/07/2021    Lipids  12/03/2023    Depression Monitoring  01/31/2024    DEXA (modify frequency per FRAX score)  Completed    Flu vaccine  Completed    Pneumococcal 65+ years Vaccine  Completed    Hepatitis C screen  Completed    Hepatitis A vaccine  Aged Out    Hib vaccine  Aged Out    Meningococcal (ACWY) vaccine  Aged Out       Hemoglobin A1C (%)   Date Value   01/31/2023 6.5   06/21/2022 8.4   04/08/2021 6.4             ( goal A1C is < 7)   Microalb/Crt.  Ratio (mcg/mg creat)   Date Value   11/05/2019 13     LDL Cholesterol (mg/dL)   Date Value   12/03/2022 83       (goal LDL is <100)   AST (U/L)   Date Value   10/23/2018 27     ALT (U/L)   Date Value   10/23/2018 22     BUN (mg/dL)   Date Value   12/03/2022 27 (H)     BP Readings from Last 3 Encounters:   01/31/23 109/66   01/18/23 (!) 142/65   01/17/23 123/71          (goal 120/80)    All Future Testing planned in [facility]  Lab Frequency Next Occurrence   Protein / Creatinine Ratio, Urine Once 12/07/2022   Sodium, Urine, Random Once 85/47/2831   Basic Metabolic Panel Once 86/53/7573   TRANSFORMINAL LUMBAR SINGLE Once 01/18/2023         Patient Active Problem List:     S/P total knee arthroplasty     Type 2 diabetes mellitus with diabetic nephropathy, without long-term current use of insulin (HCC)     Hypertension     Asthma     Arthritis     Acquired trigger finger     Osteoarthritis of knee     Morbid obesity with BMI of 40.0-44.9, adult (HCC)     Current mild episode of major depressive disorder without prior episode ([facility] [address]     Bilateral carotid artery stenosis     Other specified glaucoma     Chronic systolic (congestive) heart failure     Lumbar radiculopathy     Lumbar degenerative disc disease     Lumbar spondylosis     Chronic renal disease, stage III ([facility] [address] [[id number]]

## 2023-02-03 ENCOUNTER — TELEPHONE (OUTPATIENT)
Dept: INTERNAL MEDICINE | Age: 78
End: 2023-02-03

## 2023-02-03 NOTE — TELEPHONE ENCOUNTER
Pc asking to speak to dr Nishant Wallace , she said she is having black bowel explosions with diarrhea pt would not say for how long and would not schedule an appointment with the office , she repeatedly said she wanted to speak to a doctor , pt was asked to go to the emergency room and declined repeatedly     Please advise

## 2023-02-05 ENCOUNTER — APPOINTMENT (OUTPATIENT)
Dept: CT IMAGING | Age: 78
DRG: 378 | End: 2023-02-05
Payer: MEDICARE

## 2023-02-05 ENCOUNTER — HOSPITAL ENCOUNTER (INPATIENT)
Age: 78
LOS: 2 days | Discharge: HOME | DRG: 378 | End: 2023-02-07
Attending: EMERGENCY MEDICINE | Admitting: INTERNAL MEDICINE
Payer: MEDICARE

## 2023-02-05 DIAGNOSIS — K92.2 GASTROINTESTINAL HEMORRHAGE, UNSPECIFIED GASTROINTESTINAL HEMORRHAGE TYPE: Primary | ICD-10-CM

## 2023-02-05 DIAGNOSIS — D50.8 OTHER IRON DEFICIENCY ANEMIA: ICD-10-CM

## 2023-02-05 DIAGNOSIS — K92.1 MELENA: ICD-10-CM

## 2023-02-05 LAB
ABSOLUTE EOS #: 0.06 K/UL (ref 0–0.44)
ABSOLUTE IMMATURE GRANULOCYTE: 0.05 K/UL (ref 0–0.3)
ABSOLUTE LYMPH #: 3.14 K/UL (ref 1.1–3.7)
ABSOLUTE MONO #: 0.68 K/UL (ref 0.1–1.2)
ALBUMIN SERPL-MCNC: 3.9 G/DL (ref 3.5–5.2)
ALBUMIN/GLOBULIN RATIO: 1.3 (ref 1–2.5)
ALP SERPL-CCNC: 55 U/L (ref 35–104)
ALT SERPL-CCNC: 13 U/L (ref 5–33)
ANION GAP SERPL CALCULATED.3IONS-SCNC: 15 MMOL/L (ref 9–17)
AST SERPL-CCNC: 32 U/L
BASOPHILS # BLD: 1 % (ref 0–2)
BASOPHILS ABSOLUTE: 0.07 K/UL (ref 0–0.2)
BILIRUB SERPL-MCNC: 0.4 MG/DL (ref 0.3–1.2)
BILIRUBIN URINE: NEGATIVE
BUN SERPL-MCNC: 33 MG/DL (ref 8–23)
CALCIUM SERPL-MCNC: 9.2 MG/DL (ref 8.6–10.4)
CHLORIDE SERPL-SCNC: 104 MMOL/L (ref 98–107)
CO2 SERPL-SCNC: 17 MMOL/L (ref 20–31)
COLOR: YELLOW
COMMENT UA: NORMAL
CREAT SERPL-MCNC: 1.09 MG/DL (ref 0.5–0.9)
EOSINOPHILS RELATIVE PERCENT: 1 % (ref 1–4)
FERRITIN SERPL-MCNC: 20 NG/ML (ref 13–150)
GFR SERPL CREATININE-BSD FRML MDRD: 52 ML/MIN/1.73M2
GLUCOSE BLD-MCNC: 138 MG/DL (ref 65–105)
GLUCOSE BLD-MCNC: 140 MG/DL (ref 65–105)
GLUCOSE SERPL-MCNC: 178 MG/DL (ref 70–99)
GLUCOSE UR STRIP.AUTO-MCNC: NEGATIVE MG/DL
HCT VFR BLD AUTO: 20.8 % (ref 36.3–47.1)
HCT VFR BLD AUTO: 24.8 % (ref 36.3–47.1)
HGB BLD-MCNC: 6.3 G/DL (ref 11.9–15.1)
HGB BLD-MCNC: 7.7 G/DL (ref 11.9–15.1)
IMMATURE GRANULOCYTES: 1 %
IRON SATURATION: 5 % (ref 20–55)
IRON SERPL-MCNC: 18 UG/DL (ref 37–145)
KETONES UR STRIP.AUTO-MCNC: NEGATIVE MG/DL
LACTIC ACID, WHOLE BLOOD: 2.1 MMOL/L (ref 0.7–2.1)
LEUKOCYTE ESTERASE UR QL STRIP.AUTO: NEGATIVE
LIPASE SERPL-CCNC: 79 U/L (ref 13–60)
LYMPHOCYTES # BLD: 31 % (ref 24–43)
MAGNESIUM SERPL-MCNC: 1.7 MG/DL (ref 1.6–2.6)
MCH RBC QN AUTO: 27.7 PG (ref 25.2–33.5)
MCHC RBC AUTO-ENTMCNC: 31 G/DL (ref 28.4–34.8)
MCV RBC AUTO: 89.2 FL (ref 82.6–102.9)
MONOCYTES # BLD: 7 % (ref 3–12)
NITRITE UR QL STRIP.AUTO: NEGATIVE
NRBC AUTOMATED: 0 PER 100 WBC
PDW BLD-RTO: 17.3 % (ref 11.8–14.4)
PLATELET # BLD AUTO: 245 K/UL (ref 138–453)
PMV BLD AUTO: 10.3 FL (ref 8.1–13.5)
POTASSIUM SERPL-SCNC: 3.6 MMOL/L (ref 3.7–5.3)
PROT SERPL-MCNC: 7 G/DL (ref 6.4–8.3)
PROT UR STRIP.AUTO-MCNC: 5.5 MG/DL (ref 5–8)
PROT UR STRIP.AUTO-MCNC: NEGATIVE MG/DL
RBC # BLD: 2.78 M/UL (ref 3.95–5.11)
RBC # BLD: ABNORMAL 10*6/UL
SEG NEUTROPHILS: 61 % (ref 36–65)
SEGMENTED NEUTROPHILS ABSOLUTE COUNT: 6.24 K/UL (ref 1.5–8.1)
SODIUM SERPL-SCNC: 136 MMOL/L (ref 135–144)
SPECIFIC GRAVITY UA: 1.02 (ref 1–1.03)
TIBC SERPL-MCNC: 339 UG/DL (ref 250–450)
TROPONIN I SERPL DL<=0.01 NG/ML-MCNC: 14 NG/L (ref 0–14)
TURBIDITY: CLEAR
UNSATURATED IRON BINDING CAPACITY: 321 UG/DL (ref 112–347)
URINE HGB: NEGATIVE
UROBILINOGEN, URINE: NORMAL
WBC # BLD AUTO: 10.2 K/UL (ref 3.5–11.3)

## 2023-02-05 PROCEDURE — 85025 COMPLETE CBC W/AUTO DIFF WBC: CPT

## 2023-02-05 PROCEDURE — 74176 CT ABD & PELVIS W/O CONTRAST: CPT

## 2023-02-05 PROCEDURE — C9113 INJ PANTOPRAZOLE SODIUM, VIA: HCPCS

## 2023-02-05 PROCEDURE — 6360000002 HC RX W HCPCS

## 2023-02-05 PROCEDURE — 86900 BLOOD TYPING SEROLOGIC ABO: CPT

## 2023-02-05 PROCEDURE — 83550 IRON BINDING TEST: CPT

## 2023-02-05 PROCEDURE — 83605 ASSAY OF LACTIC ACID: CPT

## 2023-02-05 PROCEDURE — 85018 HEMOGLOBIN: CPT

## 2023-02-05 PROCEDURE — 36415 COLL VENOUS BLD VENIPUNCTURE: CPT

## 2023-02-05 PROCEDURE — 2060000000 HC ICU INTERMEDIATE R&B

## 2023-02-05 PROCEDURE — 83735 ASSAY OF MAGNESIUM: CPT

## 2023-02-05 PROCEDURE — 80053 COMPREHEN METABOLIC PANEL: CPT

## 2023-02-05 PROCEDURE — 83690 ASSAY OF LIPASE: CPT

## 2023-02-05 PROCEDURE — 2580000003 HC RX 258

## 2023-02-05 PROCEDURE — 6370000000 HC RX 637 (ALT 250 FOR IP)

## 2023-02-05 PROCEDURE — 86901 BLOOD TYPING SEROLOGIC RH(D): CPT

## 2023-02-05 PROCEDURE — 86850 RBC ANTIBODY SCREEN: CPT

## 2023-02-05 PROCEDURE — 96374 THER/PROPH/DIAG INJ IV PUSH: CPT

## 2023-02-05 PROCEDURE — 85014 HEMATOCRIT: CPT

## 2023-02-05 PROCEDURE — P9016 RBC LEUKOCYTES REDUCED: HCPCS

## 2023-02-05 PROCEDURE — 83540 ASSAY OF IRON: CPT

## 2023-02-05 PROCEDURE — 81003 URINALYSIS AUTO W/O SCOPE: CPT

## 2023-02-05 PROCEDURE — 84484 ASSAY OF TROPONIN QUANT: CPT

## 2023-02-05 PROCEDURE — 99285 EMERGENCY DEPT VISIT HI MDM: CPT

## 2023-02-05 PROCEDURE — 86920 COMPATIBILITY TEST SPIN: CPT

## 2023-02-05 PROCEDURE — 82728 ASSAY OF FERRITIN: CPT

## 2023-02-05 PROCEDURE — 82947 ASSAY GLUCOSE BLOOD QUANT: CPT

## 2023-02-05 RX ORDER — MAGNESIUM SULFATE IN WATER 40 MG/ML
2000 INJECTION, SOLUTION INTRAVENOUS ONCE
Status: COMPLETED | OUTPATIENT
Start: 2023-02-05 | End: 2023-02-06

## 2023-02-05 RX ORDER — ACETAMINOPHEN 650 MG/1
650 SUPPOSITORY RECTAL EVERY 6 HOURS PRN
Status: DISCONTINUED | OUTPATIENT
Start: 2023-02-05 | End: 2023-02-06

## 2023-02-05 RX ORDER — SODIUM CHLORIDE 9 MG/ML
INJECTION, SOLUTION INTRAVENOUS PRN
Status: DISCONTINUED | OUTPATIENT
Start: 2023-02-05 | End: 2023-02-05

## 2023-02-05 RX ORDER — ACETAMINOPHEN 325 MG/1
650 TABLET ORAL EVERY 6 HOURS PRN
Status: DISCONTINUED | OUTPATIENT
Start: 2023-02-05 | End: 2023-02-06

## 2023-02-05 RX ORDER — ONDANSETRON 2 MG/ML
4 INJECTION INTRAMUSCULAR; INTRAVENOUS EVERY 6 HOURS PRN
Status: DISCONTINUED | OUTPATIENT
Start: 2023-02-05 | End: 2023-02-07 | Stop reason: HOSPADM

## 2023-02-05 RX ORDER — SODIUM CHLORIDE 0.9 % (FLUSH) 0.9 %
5-40 SYRINGE (ML) INJECTION EVERY 12 HOURS SCHEDULED
Status: DISCONTINUED | OUTPATIENT
Start: 2023-02-05 | End: 2023-02-07 | Stop reason: HOSPADM

## 2023-02-05 RX ORDER — INSULIN LISPRO 100 [IU]/ML
0-4 INJECTION, SOLUTION INTRAVENOUS; SUBCUTANEOUS NIGHTLY
Status: DISCONTINUED | OUTPATIENT
Start: 2023-02-05 | End: 2023-02-07 | Stop reason: HOSPADM

## 2023-02-05 RX ORDER — ONDANSETRON 2 MG/ML
4 INJECTION INTRAMUSCULAR; INTRAVENOUS ONCE
Status: COMPLETED | OUTPATIENT
Start: 2023-02-05 | End: 2023-02-05

## 2023-02-05 RX ORDER — ONDANSETRON 4 MG/1
4 TABLET, ORALLY DISINTEGRATING ORAL EVERY 8 HOURS PRN
Status: DISCONTINUED | OUTPATIENT
Start: 2023-02-05 | End: 2023-02-07 | Stop reason: HOSPADM

## 2023-02-05 RX ORDER — ATORVASTATIN CALCIUM 40 MG/1
40 TABLET, FILM COATED ORAL DAILY
Status: DISCONTINUED | OUTPATIENT
Start: 2023-02-05 | End: 2023-02-07 | Stop reason: HOSPADM

## 2023-02-05 RX ORDER — SODIUM CHLORIDE 9 MG/ML
INJECTION, SOLUTION INTRAVENOUS PRN
Status: COMPLETED | OUTPATIENT
Start: 2023-02-05 | End: 2023-02-06

## 2023-02-05 RX ORDER — INSULIN LISPRO 100 [IU]/ML
0-4 INJECTION, SOLUTION INTRAVENOUS; SUBCUTANEOUS
Status: DISCONTINUED | OUTPATIENT
Start: 2023-02-05 | End: 2023-02-07 | Stop reason: HOSPADM

## 2023-02-05 RX ORDER — SODIUM CHLORIDE 9 MG/ML
INJECTION, SOLUTION INTRAVENOUS PRN
Status: DISCONTINUED | OUTPATIENT
Start: 2023-02-05 | End: 2023-02-06

## 2023-02-05 RX ORDER — DEXTROSE MONOHYDRATE 100 MG/ML
INJECTION, SOLUTION INTRAVENOUS CONTINUOUS PRN
Status: DISCONTINUED | OUTPATIENT
Start: 2023-02-05 | End: 2023-02-07 | Stop reason: HOSPADM

## 2023-02-05 RX ORDER — SODIUM CHLORIDE 0.9 % (FLUSH) 0.9 %
5-40 SYRINGE (ML) INJECTION PRN
Status: DISCONTINUED | OUTPATIENT
Start: 2023-02-05 | End: 2023-02-07 | Stop reason: HOSPADM

## 2023-02-05 RX ORDER — 0.9 % SODIUM CHLORIDE 0.9 %
1000 INTRAVENOUS SOLUTION INTRAVENOUS ONCE
Status: COMPLETED | OUTPATIENT
Start: 2023-02-05 | End: 2023-02-05

## 2023-02-05 RX ORDER — SODIUM CHLORIDE, SODIUM LACTATE, POTASSIUM CHLORIDE, CALCIUM CHLORIDE 600; 310; 30; 20 MG/100ML; MG/100ML; MG/100ML; MG/100ML
INJECTION, SOLUTION INTRAVENOUS CONTINUOUS
Status: DISCONTINUED | OUTPATIENT
Start: 2023-02-05 | End: 2023-02-06

## 2023-02-05 RX ORDER — POTASSIUM CHLORIDE 20 MEQ/1
40 TABLET, EXTENDED RELEASE ORAL ONCE
Status: COMPLETED | OUTPATIENT
Start: 2023-02-05 | End: 2023-02-05

## 2023-02-05 RX ORDER — TRAMADOL HYDROCHLORIDE 50 MG/1
50 TABLET ORAL EVERY 6 HOURS PRN
Status: DISCONTINUED | OUTPATIENT
Start: 2023-02-05 | End: 2023-02-07 | Stop reason: HOSPADM

## 2023-02-05 RX ORDER — CITALOPRAM 20 MG/1
40 TABLET ORAL DAILY
Status: DISCONTINUED | OUTPATIENT
Start: 2023-02-05 | End: 2023-02-05

## 2023-02-05 RX ADMIN — ONDANSETRON 4 MG: 2 INJECTION INTRAMUSCULAR; INTRAVENOUS at 12:29

## 2023-02-05 RX ADMIN — SODIUM CHLORIDE, PRESERVATIVE FREE 40 MG: 5 INJECTION INTRAVENOUS at 16:44

## 2023-02-05 RX ADMIN — DESMOPRESSIN ACETATE 40 MG: 0.2 TABLET ORAL at 16:45

## 2023-02-05 RX ADMIN — SODIUM CHLORIDE, PRESERVATIVE FREE 10 ML: 5 INJECTION INTRAVENOUS at 22:29

## 2023-02-05 RX ADMIN — ONDANSETRON 4 MG: 2 INJECTION INTRAMUSCULAR; INTRAVENOUS at 22:26

## 2023-02-05 RX ADMIN — CITALOPRAM 40 MG: 20 TABLET, FILM COATED ORAL at 16:45

## 2023-02-05 RX ADMIN — SODIUM CHLORIDE 1000 ML: 9 INJECTION, SOLUTION INTRAVENOUS at 12:29

## 2023-02-05 RX ADMIN — MAGNESIUM SULFATE HEPTAHYDRATE 2000 MG: 40 INJECTION, SOLUTION INTRAVENOUS at 22:03

## 2023-02-05 RX ADMIN — SODIUM CHLORIDE 80 MG: 9 INJECTION, SOLUTION INTRAVENOUS at 14:43

## 2023-02-05 RX ADMIN — POTASSIUM CHLORIDE 40 MEQ: 1500 TABLET, EXTENDED RELEASE ORAL at 14:47

## 2023-02-05 RX ADMIN — SODIUM CHLORIDE, POTASSIUM CHLORIDE, SODIUM LACTATE AND CALCIUM CHLORIDE: 600; 310; 30; 20 INJECTION, SOLUTION INTRAVENOUS at 14:43

## 2023-02-05 ASSESSMENT — PAIN DESCRIPTION - DESCRIPTORS: DESCRIPTORS: DULL;ACHING

## 2023-02-05 ASSESSMENT — ENCOUNTER SYMPTOMS
ABDOMINAL PAIN: 1
DIARRHEA: 1
SHORTNESS OF BREATH: 0
NAUSEA: 1
BLOOD IN STOOL: 1

## 2023-02-05 ASSESSMENT — PAIN DESCRIPTION - ORIENTATION: ORIENTATION: LOWER

## 2023-02-05 ASSESSMENT — LIFESTYLE VARIABLES
HOW OFTEN DO YOU HAVE A DRINK CONTAINING ALCOHOL: MONTHLY OR LESS
HOW MANY STANDARD DRINKS CONTAINING ALCOHOL DO YOU HAVE ON A TYPICAL DAY: 1 OR 2

## 2023-02-05 ASSESSMENT — PAIN SCALES - GENERAL: PAINLEVEL_OUTOF10: 2

## 2023-02-05 ASSESSMENT — PAIN - FUNCTIONAL ASSESSMENT: PAIN_FUNCTIONAL_ASSESSMENT: 0-10

## 2023-02-05 ASSESSMENT — PAIN DESCRIPTION - LOCATION: LOCATION: ABDOMEN

## 2023-02-05 NOTE — ED NOTES
Transport called at this time. Pt denies needs prior to admission.       Wagner Gordon, RN  02/05/23 3611

## 2023-02-05 NOTE — ED PROVIDER NOTES
[facility]- [facility]  Emergency Department Encounter  Emergency Medicine Resident     Pt Name:La Collado  MRN: [id number]  [place] 1945  Date of evaluation: 2/5/23  PCP:  Henry Anderson MD  Note Started: 12:03 PM EST      CHIEF COMPLAINT       Chief Complaint   Patient presents with    Abdominal Pain     Lower mid abd pain, \"dull ache\"    Nausea     \"Intermittently\"       HISTORY OF PRESENT ILLNESS  (Location/Symptom, Timing/Onset, Context/Setting, Quality, Duration, Modifying Factors, Severity.)      Brianna Collado is a 68 y.o. female with a history of CHF with EF of 55%, diabetes, CKD, hypertension who presents with abdominal pain, nausea, dark tarry stools. Patient states about 5 days ago she began having dark, loose, tarry stools patient states she had a few episodes of this over 2 days and then it resolved. Patient states she now has loose, yellow bowel movements every time she has p.o. intake. Patient notes mild epigastric aching as well as suprapubic aching. States has been taking a lot of NSAIDs recently due to chronic lumbar back pain. PAST MEDICAL / SURGICAL / SOCIAL / FAMILY HISTORY      has a past medical history of Acquired trigger finger, ROSELIA (acute kidney injury) ([address] Allergic rhinitis, Anesthesia complication, Anxiety, Arthritis, Asthma, Carotid stenosis, asymptomatic, CKD (chronic kidney disease) stage 3, GFR 30-59 ml/min ([address] Diabetes mellitus ([address] Dry skin, Dyspnea, Frequency of urination, History of bronchitis, Hyperkalemia, Hypertension, Knee pain, left, Obesity, Obsessive compulsive disorder, Osteoarthritis of knee, Post-nasal drip, S/P total knee arthroplasty, Sleep apnea, Urgency incontinence, Urinary, incontinence, stress female, Wears glasses, and Wears partial dentures. has a past surgical history that includes joint replacement; back surgery; lumbar discectomy; Carpal tunnel release (Bilateral); Dilation and curettage of uterus;  Tonsillectomy; eye surgery; Cataract removal with implant (Bilateral); Total knee arthroplasty (Right); Total knee arthroplasty; Total knee arthroplasty (Left, 2014); Colonoscopy (2021); Colonoscopy (2021); and Colonoscopy (2021). Social History     Socioeconomic History    Marital status:      Spouse name: Not on file    Number of children: Not on file    Years of education: Not on file    Highest education level: Not on file   Occupational History    Not on file   Tobacco Use    Smoking status: Former     Packs/day: 1.00     Types: Cigarettes     Quit date: 1998     Years since quittin.1    Smokeless tobacco: Never   Vaping Use    Vaping Use: Never used   Substance and Sexual Activity    Alcohol use: Yes     Comment: occas    Drug use: No    Sexual activity: Not on file   Other Topics Concern    Not on file   Social History Narrative    Not on file     Social Determinants of Health     Financial Resource Strain: Low Risk     Difficulty of Paying Living Expenses: Not hard at all   Food Insecurity: No Food Insecurity    Worried About [address] in the Last Year: Never true    [address] in the Last Year: Never true   Transportation Needs: No Transportation Needs    Lack of Transportation (Medical): No    Lack of Transportation (Non-Medical): No   Physical Activity: Not on file   Stress: Not on file   Social Connections: Not on file   Intimate Partner Violence: Not on file   Housing Stability: Not on file       Family History   Problem Relation Age of Onset    Heart Disease Mother     Heart Disease Brother     Stroke Other     Colon Cancer Other     Coronary Art Dis Other        Allergies:  Aspirin    Home Medications:  Prior to Admission medications    Medication Sig Start Date End Date Taking?  Authorizing Provider   metFORMIN (GLUCOPHAGE) 1000 MG tablet TAKE ONE TABLET BY MOUTH TWICE A DAY WITH MEALS 23   Anita Diggs MD   fluticasone (FLOVENT HFA) 110 MCG/ACT inhaler Inhale 2 puffs into the lungs 2 times daily 1/17/23   Katie Lopez MD   fluticasone (FLONASE) 50 MCG/ACT nasal spray 2 sprays by Nasal route daily 1/17/23   Katie Lopez MD   valsartan-hydroCHLOROthiazide (DIOVAN-HCT) 160-12.5 MG per tablet TAKE ONE TABLET BY MOUTH DAILY 12/6/22   Chuck Richey MD   simvastatin (ZOCOR) 40 MG tablet TAKE ONE TABLET BY MOUTH ONCE NIGHTLY 7/5/22   Chuck Richey MD   glimepiride (AMARYL) 4 MG tablet Take 1 tablet by mouth every morning (before breakfast) 6/21/22   Chuck Richey MD   citalopram (CELEXA) 40 MG tablet TAKE ONE TABLET BY MOUTH DAILY 2/24/22   Chuck Richey MD   Latanoprost 0.005 % EMUL latanoprost 0.005 % eye drops    Historical Provider, MD   albuterol sulfate  (90 Base) MCG/ACT inhaler Inhale 2 puffs into the lungs every 6 hours as needed for Wheezing 12/3/19   Katie Lopez MD   acetaminophen (TYLENOL) 500 MG tablet Take 500 mg by mouth    Historical Provider, MD   aspirin 81 MG tablet Take 81 mg by mouth daily    Historical Provider, MD         REVIEW OF SYSTEMS       Review of Systems   Constitutional:  Negative for chills and fever. Respiratory:  Negative for shortness of breath. Gastrointestinal:  Positive for abdominal pain, blood in stool, diarrhea and nausea. Neurological:  Positive for weakness. PHYSICAL EXAM      INITIAL VITALS:   BP (!) 114/57   Pulse 90   Temp 98.8 °F (37.1 °C) (Oral)   Resp 15   Ht 5' (1.524 m)   Wt 166 lb (75.3 kg)   SpO2 97%   BMI 32.42 kg/m²     Physical Exam  Vitals and nursing note reviewed. Constitutional:       General: She is not in acute distress. Appearance: She is well-developed. She is obese. She is not toxic-appearing. HENT:      Head: Normocephalic and atraumatic. Cardiovascular:      Rate and Rhythm: Regular rhythm. Tachycardia present. Pulmonary:      Effort: Pulmonary effort is normal. No respiratory distress. Breath sounds: Normal breath sounds. Abdominal:      General: There is no distension. Palpations: Abdomen is soft. Tenderness: There is abdominal tenderness in the epigastric area and suprapubic area. Skin:     General: Skin is warm and dry. Coloration: Skin is pale. Neurological:      General: No focal deficit present. Mental Status: She is alert and oriented to person, place, and time. DDX/DIAGNOSTIC RESULTS / EMERGENCY DEPARTMENT COURSE / [facility]     Medical Decision Making  80-year-old female presents with concerns for dark tarry stool over the past week. States she has been taking NSAIDs frequently for chronic lumbar back pain. Patient also notes generalized weakness. Patient is mildly tachycardic in the emergency department. Vital signs otherwise within normal limits. On exam patient has mild epigastric and suprapubic abdominal pain. External rectal exam without hemorrhoids. Internal rectal exam without palpable masses or hemorrhoids, stool is yellow and guaiac negative. DDx: Ulcer, diverticulitis, colon cancer, UTI    Amount and/or Complexity of Data Reviewed  External Data Reviewed: notes. Labs: ordered. Decision-making details documented in ED Course. Radiology: ordered. Discussion of management or test interpretation with external provider(s): Patient given liter of fluids due to concerns for volume loss. Patient given Zofran for nausea    Risk  Prescription drug management. Decision regarding hospitalization. Risk Details: Patient to be admitted by internal medicine team due to anemia and likely GI bleed. Needs to be evaluated by GI team inpatient.   Discussed admission with patient and she was agreeable            EMERGENCY DEPARTMENT COURSE:      ED Course as of 02/05/23 2312   Sun Feb 05, 2023   1208 Zofran ordered for nausea, patient declining medication for pain at this time [TD]   1229 Hemoglobin Quant(!): 7.7 [TD]   1256 CT abdomen pelvis: No acute abdominal or pelvic abnormality [TD]   1321 Patient to be admitted by internal medicine for anemia and GI bleed. Will order protonix bolus [TD]      ED Course User Index  [TD] Smitha Aviles DO           CONSULTS:  IP CONSULT TO INTERNAL MEDICINE  IP CONSULT TO GI  IP CONSULT TO CASE MANAGEMENT        FINAL IMPRESSION      1. Gastrointestinal hemorrhage, unspecified gastrointestinal hemorrhage type          DISPOSITION / PLAN     DISPOSITION Admitted 02/05/2023 02:18:44 PM      PATIENT REFERRED TO:  No follow-up provider specified.     DISCHARGE MEDICATIONS:  Current Discharge Medication List          Smitha Aviles DO  Emergency Medicine Resident    (Please note that portions of thisnote were completed with a voice recognition program.  Efforts were made to edit the dictations but occasionally words are mis-transcribed.)      Smitha Aviles DO  Resident  02/05/23 [id number]

## 2023-02-05 NOTE — ED NOTES
The following labs were labeled with appropriate pt sticker and tubed to lab:     [] Blue     [x] Lavender   [] on ice  [x] Green/yellow  [] Green/black [] on ice  [] Foster Royal  [] on ice  [] Yellow  [] Red  [] Type/ Screen  [] ABG  [] VBG    [] COVID-19 swab    [] Rapid  [] PCR  [] Flu swab  [] Peds Viral Panel     [] Urine Sample  [] Fecal Sample  [] Pelvic Cultures  [] Blood Cultures  [] X 2  [] STREP Cultures       Nader Moreno RN  02/05/23 9407

## 2023-02-05 NOTE — H&P
[address]     Department of Internal Medicine - Staff Internal Medicine Teaching Service          ADMISSION NOTE/HISTORY AND PHYSICAL EXAMINATION   Date: 2/5/2023  Patient Name: Muna Collado  Date of admission: 2/5/2023 11:29 AM  YOB: 1945  PCP: Arina Gallego MD  History Obtained From:  patient    CHIEF COMPLAINT     Chief complaint: Black tarry stools, generalized weakness. HISTORY OF PRESENTING ILLNESS     The patient is a pleasant 68 y.o. female with PMH of T2DM, HTN, HFpEF chronic back pain presented to ED with complaints of black tarry stools. Per patient, she was completely altered at baseline mental 2nd February when she developed black tarry stools on fourth and fifth of February. Patient had total of 4 episodes in these 2 days where the stools were formed but extremely black. No bright red blood per rectum noted. No hematemesis. Patient denies taking any anticoagulant or iron supplements. Patient quit smoking 25 years ago, does not drink does not use any recreational drug. Per patient, black starry stools started on its own and afterwards, patient was feeding bloated with generalized abdominal ache with loss of appetite. Patient did vomit on Wednesday. No nausea, chest pain, shortness of breath. Patient has been feeling generalized weakness. In ED, work-up showed none anion gap metabolic acidosis with bicarb of 17, ROSELIA with creatinine of 1.09, hemoglobin dropped to 7.7 (9 in December). No signs of active bleeding noted. CT abdomen and pelvis was done which was unremarkable. Internal medicine and GI was consulted from ER for evaluation of occult GI bleed.        Review of Systems:  General ROS: Completed and except as mentioned above were negative   HEENT ROS: Completed and except as mentioned above were negative   Allergy and Immunology ROS:  Completed and except as mentioned above were negative  Hematological and Lymphatic ROS: Completed and except as mentioned above were negative  Respiratory ROS:  Completed and except as mentioned above were negative  Cardiovascular ROS:  Completed and except as mentioned above were negative  Gastrointestinal ROS: Completed and except as mentioned above were negative  Genito-Urinary ROS:  Completed and except as mentioned above were negative  Musculoskeletal ROS:  Completed and except as mentioned above were negative  Neurological ROS:  Completed and except as mentioned above were negative  Skin & Dermatological ROS:  Completed and except as mentioned above were negative  Psychological ROS:  Completed and except as mentioned above were negative    PAST MEDICAL HISTORY     Past Medical History:   Diagnosis Date    Acquired trigger finger 4/27/2018    ROSELIA (acute kidney injury) ([facility] [address] 2011    Allergic rhinitis     Anesthesia complication     after or bp dropped kidney labs elevated for few years    Anxiety     Arthritis     Asthma     Carotid stenosis, asymptomatic     16-49%    CKD (chronic kidney disease) stage 3, GFR 30-59 ml/min ([facility])     Diabetes mellitus ([facility])     Dry skin     Dyspnea     Frequency of urination     History of bronchitis     Hyperkalemia 1/24/2014    Hypertension     Knee pain, left     Obesity     Obsessive compulsive disorder     Osteoarthritis of knee 4/27/2018    Post-nasal drip     S/P total knee arthroplasty 1/24/2014    Sleep apnea     Urgency incontinence     Urinary, incontinence, stress female     Wears glasses     Wears partial dentures     upper       PAST SURGICAL HISTORY     Past Surgical History:   Procedure Laterality Date    BACK SURGERY      CARPAL TUNNEL RELEASE Bilateral     10 yrs ago    CATARACT REMOVAL WITH IMPLANT Bilateral     COLONOSCOPY  7/1/2021    COLONOSCOPY POLYPECTOMY HOT BIOPSY performed by Louann Figueroa MD at [facility]    COLONOSCOPY  7/1/2021    COLONOSCOPY W/ ENDOSCOPIC MUCOSAL RESECTION performed by Louann Figueroa MD at [facility]    COLONOSCOPY  7/1/2021    COLONOSCOPY WITH BIOPSY performed by Kaylan Navarro MD at [facility]      JOINT REPLACEMENT      LUMBAR DISCECTOMY      TONSILLECTOMY      TOTAL KNEE ARTHROPLASTY Right     TOTAL KNEE ARTHROPLASTY      TOTAL KNEE ARTHROPLASTY Left 01/23/2014       ALLERGIES     Aspirin    MEDICATIONS PRIOR TO ADMISSION     Prior to Admission medications    Medication Sig Start Date End Date Taking? Authorizing Provider   metFORMIN (GLUCOPHAGE) 1000 MG tablet TAKE ONE TABLET BY MOUTH TWICE A DAY WITH MEALS 2/2/23   Rissa Davison MD   traMADol (ULTRAM) 50 MG tablet TAKE ONE TABLET BY MOUTH TWICE A DAY AS NEEDED FOR PAIN FOR UP TO 7 DAYS *MAX TWO TABLETS DAILY* 1/26/23 2/25/23  Rissa Davison MD   fluticasone (FLOVENT HFA) 110 MCG/ACT inhaler Inhale 2 puffs into the lungs 2 times daily 1/17/23   Hugo Hardy MD   fluticasone (FLONASE) 50 MCG/ACT nasal spray 2 sprays by Nasal route daily 1/17/23   Hugo Hardy MD   valsartan-hydroCHLOROthiazide (DIOVAN-HCT) 160-12.5 MG per tablet TAKE ONE TABLET BY MOUTH DAILY 12/6/22   Rissa Davison MD   simvastatin (ZOCOR) 40 MG tablet TAKE ONE TABLET BY MOUTH ONCE NIGHTLY 7/5/22   Rissa Davison MD   glimepiride (AMARYL) 4 MG tablet Take 1 tablet by mouth every morning (before breakfast) 6/21/22   Rissa Davison MD   citalopram (CELEXA) 40 MG tablet TAKE ONE TABLET BY MOUTH DAILY 2/24/22   Rissa Davison MD   Latanoprost 0.005 % EMUL latanoprost 0.005 % eye drops    Historical Provider, MD   albuterol sulfate  (90 Base) MCG/ACT inhaler Inhale 2 puffs into the lungs every 6 hours as needed for Wheezing 12/3/19   Hugo Hardy MD   acetaminophen (TYLENOL) 500 MG tablet Take 500 mg by mouth    Historical Provider, MD   aspirin 81 MG tablet Take 81 mg by mouth daily    Historical Provider, MD       SOCIAL HISTORY     Tobacco: Quit 25 years ago. Smoked 1 pack of cigarettes. Alcohol: Social drinker. Not actively drinking  Illicits: Denies    FAMILY HISTORY     Family History   Problem Relation Age of Onset    Heart Disease Mother     Heart Disease Brother     Stroke Other     Colon Cancer Other     Coronary Art Dis Other        PHYSICAL EXAM     Vitals: /67   Pulse 90   Temp 97.6 °F (36.4 °C) (Oral)   Resp 18   Ht 5' (1.524 m)   Wt 166 lb (75.3 kg)   SpO2 98%   BMI 32.42 kg/m²   Tmax: Temp (24hrs), Av.6 °F (36.4 °C), Min:97.6 °F (36.4 °C), Max:97.6 °F (36.4 °C)    Last Body weight:   Wt Readings from Last 3 Encounters:   23 166 lb (75.3 kg)   23 166 lb 12.8 oz (75.7 kg)   23 172 lb (78 kg)     Body Mass Index : Body mass index is 32.42 kg/m². PHYSICAL EXAMINATION:  Constitutional: This is a well developed, well nourished,  68y.o. year old female who is alert, oriented, cooperative and in no apparent distress. Head:normocephalic and atraumatic. EENT:  PERRLA. No conjunctival injections. Septum was midline, mucosa was without erythema, exudates or cobblestoning. No thrush was noted. Neck: Supple without thyromegaly. No elevated JVP. Trachea was midline. Respiratory: Chest was symmetrical without dullness to percussion. Breath sounds bilaterally were clear to auscultation. There were no wheezes, rhonchi or rales. There is no intercostal retraction or use of accessory muscles. No egophony noted. Cardiovascular: Regular without murmur, clicks, gallops or rubs. Abdomen: Slightly rounded and soft without organomegaly. No rebound, rigidity or guarding was appreciated. Lymphatic: No lymphadenopathy. Musculoskeletal: Normal curvature of the spine. No gross muscle weakness. Extremities:  No lower extremity edema, ulcerations, tenderness, varicosities or erythema. Muscle size, tone and strength are normal.  No involuntary movements are noted. Skin:  Warm and dry. Good color, turgor and pigmentation. No lesions or scars.   No cyanosis or clubbing  Neurological/Psychiatric: The patient's general behavior, level of consciousness, thought content and emotional status is normal.          INVESTIGATIONS     Laboratory Testing:     Recent Results (from the past 24 hour(s))   CBC with Auto Differential    Collection Time: 02/05/23 12:15 PM   Result Value Ref Range    WBC 10.2 3.5 - 11.3 k/uL    RBC 2.78 (L) 3.95 - 5.11 m/uL    Hemoglobin 7.7 (L) 11.9 - 15.1 g/dL    Hematocrit 24.8 (L) 36.3 - 47.1 %    MCV 89.2 82.6 - 102.9 fL    MCH 27.7 25.2 - 33.5 pg    MCHC 31.0 28.4 - 34.8 g/dL    RDW 17.3 (H) 11.8 - 14.4 %    Platelets 805 612 - 180 k/uL    MPV 10.3 8.1 - 13.5 fL    NRBC Automated 0.0 0.0 per 100 WBC    RBC Morphology ANISOCYTOSIS PRESENT     Seg Neutrophils 61 36 - 65 %    Lymphocytes 31 24 - 43 %    Monocytes 7 3 - 12 %    Eosinophils % 1 1 - 4 %    Basophils 1 0 - 2 %    Immature Granulocytes 1 (H) 0 %    Segs Absolute 6.24 1.50 - 8.10 k/uL    Absolute Lymph # 3.14 1.10 - 3.70 k/uL    Absolute Mono # 0.68 0.10 - 1.20 k/uL    Absolute Eos # 0.06 0.00 - 0.44 k/uL    Basophils Absolute 0.07 0.00 - 0.20 k/uL    Absolute Immature Granulocyte 0.05 0.00 - 0.30 k/uL   CMP    Collection Time: 02/05/23 12:15 PM   Result Value Ref Range    Glucose 178 (H) 70 - 99 mg/dL    BUN 33 (H) 8 - 23 mg/dL    Creatinine 1.09 (H) 0.50 - 0.90 mg/dL    Est, Glom Filt Rate 52 (L) >60 mL/min/1.73m2    Calcium 9.2 8.6 - 10.4 mg/dL    Sodium 136 135 - 144 mmol/L    Potassium 3.6 (L) 3.7 - 5.3 mmol/L    Chloride 104 98 - 107 mmol/L    CO2 17 (L) 20 - 31 mmol/L    Anion Gap 15 9 - 17 mmol/L    Alkaline Phosphatase 55 35 - 104 U/L    ALT 13 5 - 33 U/L    AST 32 (H) <32 U/L    Total Bilirubin 0.4 0.3 - 1.2 mg/dL    Total Protein 7.0 6.4 - 8.3 g/dL    Albumin 3.9 3.5 - 5.2 g/dL    Albumin/Globulin Ratio 1.3 1.0 - 2.5   Lipase    Collection Time: 02/05/23 12:15 PM   Result Value Ref Range    Lipase 79 (H) 13 - 60 U/L   Magnesium    Collection Time: 02/05/23 12:15 PM   Result Value Ref Range    Magnesium 1.7 1.6 - 2.6 mg/dL   Troponin    Collection Time: 02/05/23 12:15 PM   Result Value Ref Range    Troponin, High Sensitivity 14 0 - 14 ng/L       Imaging:   CT ABDOMEN PELVIS WO CONTRAST Additional Contrast? None    Result Date: 2/5/2023  No acute abdominal or pelvic abnormality. ASSESSMENT & PLAN     ASSESSMENT / PLAN:     IMPRESSION  This is a 68 y.o. female who presented with black tarry stools and generalized abdominal pain and found to have acute blood loss anemia with hemoglobin of 7.7 likely secondary to occult GI bleed. Occult GI bleed  Patient had a colonoscopy done in 2021 that showed multiple polyps status post resection/polypectomy along with internal hemorrhoids. Patient was recommended to have a repeat colonoscopy in 6 months. 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 Currently no signs of active bleeding. H&H stable at 7.7  Will repeat H&H every 6 hours. Transfuse blood as needed. Keep clear liquid diet for now. GI consult placed, appreciate their recommendations. Type 2 diabetes mellitus-controlled  Last A1c 6.5 in December 2022. Patient takes metformin and glimepiride at home. This patient has not eating and drinking actively, will hold on home medications and start sliding scale. Will adjust insulin as needed. Hypertension-controlled  Patient takes valsartan hydrochlorothiazide at home. Will hold for now as blood pressure is borderline. Bilateral carotid stenosis  She follows with vascular for Carotid artery stenosis and is suppose to obtain CTA but is waiting to see nephrology. She has had a steady incline in Cr over years as far back as 2011 strongly suggestive for CKD. Currently creatinine 1.09. HFpEF-at baseline-compensated  Echo done August 2021 shows EF of 54 with G1 DD. Right atrium mildly dilated, trivial MR. Patient takes valsartan HCTZ, aspirin at home.   Will hold for now, monitor      DVT ppx: EPC cuff  GI ppx: Protonix  Diet: Clear liquid  PT/OT/SW: Consulted  Discharge Planning: Pending    Jose Gamble MD  Internal Medicine Resident, PGY-1  [facility]; [place], [place]  2/5/2023, 3:08 PM   Attending Physician Statement    I have discussed the case of Collette Mantis A Mix, including pertinent history and exam findings with the resident. I have seen and examined the patient and the key elements of the encounter have been performed by me. I agree with the assessment, plan, and orders as documented by the resident.   See my comments on today's progress note  Electronically signed by Charmain Baumgarten, MD on 2/6/2023 at 1:44 PM

## 2023-02-05 NOTE — ED PROVIDER NOTES
[facility]     Emergency Department     Faculty Attestation    I performed a history and physical examination of the patient and discussed management with the resident. I reviewed the residents note and agree with the documented findings including all diagnostic interpretations and plan of care. Any areas of disagreement are noted on the chart. I was personally present for the key portions of any procedures. I have documented in the chart those procedures where I was not present during the key portions. I have reviewed the emergency nurses triage note. I agree with the chief complaint, past medical history, past surgical history, allergies, medications, social and family history as documented unless otherwise noted below. Documentation of the HPI, Physical Exam and Medical Decision Making performed by cristiano is based on my personal performance of the HPI, PE and MDM. For Physician Assistant/ Nurse Practitioner cases/documentation I have personally evaluated this patient and have completed at least one if not all key elements of the E/M (history, physical exam, and MDM). Additional findings are as noted. Primary Care Physician: Sonu Veliz MD    VITAL SIGNS:   height is 5' (1.524 m) and weight is 166 lb (75.3 kg). Her oral temperature is 97.6 °F (36.4 °C). Her blood pressure is 121/67 and her pulse is 90. Her respiration is 18 and oxygen saturation is 98%. Medical Decision Making  Abdominal pain, black tarry stools. Black stools through the middle of the week, have since resolved. Reports epigastric abdominal pain as well as left lower quadrant abdominal pain. No prior history of gastric ulcer diverticulitis colitis. She does report that she has been taking NSAIDs regularly due to lumbar radiculopathy. No prior history of GI bleeding that she is aware of.   On examination abdomen is soft, some mild tenderness in epigastric as well as the left lower quadrant no rebound or guarding no masses. Rectal exam per the resident does not show obvious blood. Concern is for GI bleeding likely upper in origin and may be NSAID induced. Labs, CT abdomen pelvis without contrast given CKD history, PPI, possible admission    Amount and/or Complexity of Data Reviewed  External Data Reviewed: labs and notes. Labs: ordered. Decision-making details documented in ED Course. Radiology: ordered. Risk  Prescription drug management. Decision regarding hospitalization.         Jacqueline Choudhary MD, Sunita Clark  Attending Emergency Physician         Gisela Manriquez MD  02/05/23 0010

## 2023-02-05 NOTE — CARE COORDINATION
Case Management Assessment  Initial Evaluation    Date/Time of Evaluation: 2/5/2023 5:20 PM  Assessment Completed by: Leeann Crespo RN    If patient is discharged prior to next notation, then this note serves as note for discharge by case management. Patient Name: Arpan Edwards                   YOB: 1945  Diagnosis: Anemia [D64.9]  Upper GI bleed [K92.2]  Gastrointestinal hemorrhage, unspecified gastrointestinal hemorrhage type [K92.2]                   Date / Time: 2/5/2023 11:29 AM    Patient Admission Status: Inpatient   Readmission Risk (Low < 19, Mod (19-27), High > 27): Readmission Risk Score: 13.2    Current PCP: Royal Mooney MD  PCP verified by CM? (P) Yes    Chart Reviewed: Yes      History Provided by: (P) Patient  Patient Orientation: (P) Alert and Oriented    Patient Cognition: (P) Alert    Hospitalization in the last 30 days (Readmission):  No    If yes, Readmission Assessment in CM Navigator will be completed. Advance Directives:      Code Status: Full Code   Patient's Primary Decision Maker is:        Discharge Planning:    Patient lives with: (P) Children Type of Home: (P) House  Primary Care Giver: (P) Self  Patient Support Systems include: (P) Children   Current Financial resources: (P) Medicare  Current community resources:    Current services prior to admission: (P) Durable Medical Equipment            Current DME: (P) Cane            Type of Home Care services:  (P) None    ADLS  Prior functional level: (P) Independent in ADLs/IADLs  Current functional level: (P) Independent in ADLs/IADLs    PT AM-PAC:   /24  OT AM-PAC:   /24    Family can provide assistance at DC: (P) Yes  Would you like Case Management to discuss the discharge plan with any other family members/significant others, and if so, who?     Plans to Return to Present Housing: (P) Yes  Other Identified Issues/Barriers to RETURNING to current housing: none  Potential Assistance needed at discharge: (P) N/A Potential DME:    Patient expects to discharge to: (P) [facility] for transportation at discharge: (P) Family    Financial    Payor: MEDICARE / Plan: MEDICARE PART A AND B / Product Type: *No Product type* /     Does insurance require precert for SNF: No    Potential assistance Purchasing Medications: No  Meds-to-Beds request:        [facility] [id number] - [facility]man - [address] [place] [address] [phone]  [address] [place] [address] Thee Null [address] [place] [address]  Phone: [phone] Fax: [phone]      Notes:    Factors facilitating achievement of predicted outcomes: Family support, Cooperative, and Pleasant    Barriers to discharge: Medical complications    Additional Case Management Notes: patient returning home with her son.  She denies needs and has transportation home    The Plan for Transition of Care is related to the following treatment goals of Anemia [D64.9]  Upper GI bleed [K92.2]  Gastrointestinal hemorrhage, unspecified gastrointestinal hemorrhage type [K92.2]    The Patient and/or Patient Representative Agree with the Discharge Plan?  yes    Rhonda Robbins RN  Case Management Department  Ph: [phone] Fax: [phone]

## 2023-02-05 NOTE — ED NOTES
Nursing report called to [address] who verbalizes understanding and denies questions for this RN. Transport to be notified. Pt denies needs prior to admission. Pt using restroom at this time.       Luis Koo RN  02/05/23 4094

## 2023-02-05 NOTE — ED TRIAGE NOTES
Pt to ED with c/o black tarry stools x2 days ago. Denies current dark stools. Pt states an increase in NSAID use. Also states intermittent nausea and states emesis x1 two days ago. States lower dull ache in abd worsened with water intake. Denies fevers or chills. Denies diarrhea or constipation.

## 2023-02-05 NOTE — ED NOTES
Pt back from CT at this time. Pt back in ED room via stretcher. Pt denies current needs.       Natasha Dumont RN  02/05/23 5201

## 2023-02-06 ENCOUNTER — ANESTHESIA EVENT (OUTPATIENT)
Dept: OPERATING ROOM | Age: 78
DRG: 378 | End: 2023-02-06
Payer: MEDICARE

## 2023-02-06 ENCOUNTER — ANESTHESIA (OUTPATIENT)
Dept: OPERATING ROOM | Age: 78
DRG: 378 | End: 2023-02-06
Payer: MEDICARE

## 2023-02-06 LAB
ABO/RH: NORMAL
ABSOLUTE EOS #: 0.08 K/UL (ref 0–0.44)
ABSOLUTE IMMATURE GRANULOCYTE: 0.04 K/UL (ref 0–0.3)
ABSOLUTE LYMPH #: 3.14 K/UL (ref 1.1–3.7)
ABSOLUTE MONO #: 0.67 K/UL (ref 0.1–1.2)
ANION GAP SERPL CALCULATED.3IONS-SCNC: 12 MMOL/L (ref 9–17)
ANTIBODY SCREEN: NEGATIVE
ARM BAND NUMBER: NORMAL
BASOPHILS # BLD: 1 % (ref 0–2)
BASOPHILS ABSOLUTE: 0.06 K/UL (ref 0–0.2)
BLD PROD TYP BPU: NORMAL
BLOOD BANK BLOOD PRODUCT EXPIRATION DATE: NORMAL
BLOOD BANK ISBT PRODUCT BLOOD TYPE: 6200
BLOOD BANK PRODUCT CODE: NORMAL
BLOOD BANK UNIT TYPE AND RH: NORMAL
BPU ID: NORMAL
BUN SERPL-MCNC: 22 MG/DL (ref 8–23)
CALCIUM SERPL-MCNC: 8.7 MG/DL (ref 8.6–10.4)
CHLORIDE SERPL-SCNC: 109 MMOL/L (ref 98–107)
CO2 SERPL-SCNC: 16 MMOL/L (ref 20–31)
CREAT SERPL-MCNC: 0.96 MG/DL (ref 0.5–0.9)
CROSSMATCH RESULT: NORMAL
DISPENSE STATUS BLOOD BANK: NORMAL
EOSINOPHILS RELATIVE PERCENT: 1 % (ref 1–4)
EXPIRATION DATE: NORMAL
GFR SERPL CREATININE-BSD FRML MDRD: >60 ML/MIN/1.73M2
GLUCOSE BLD-MCNC: 131 MG/DL (ref 65–105)
GLUCOSE BLD-MCNC: 140 MG/DL (ref 65–105)
GLUCOSE BLD-MCNC: 146 MG/DL (ref 65–105)
GLUCOSE BLD-MCNC: 162 MG/DL (ref 65–105)
GLUCOSE SERPL-MCNC: 136 MG/DL (ref 70–99)
HCT VFR BLD AUTO: 24.1 % (ref 36.3–47.1)
HCT VFR BLD AUTO: 24.3 % (ref 36.3–47.1)
HCT VFR BLD AUTO: 24.3 % (ref 36.3–47.1)
HCT VFR BLD AUTO: 25.8 % (ref 36.3–47.1)
HGB BLD-MCNC: 7.6 G/DL (ref 11.9–15.1)
HGB BLD-MCNC: 7.7 G/DL (ref 11.9–15.1)
HGB BLD-MCNC: 7.8 G/DL (ref 11.9–15.1)
HGB BLD-MCNC: 7.8 G/DL (ref 11.9–15.1)
IMMATURE GRANULOCYTES: 1 %
LYMPHOCYTES # BLD: 37 % (ref 24–43)
MCH RBC QN AUTO: 27.3 PG (ref 25.2–33.5)
MCHC RBC AUTO-ENTMCNC: 29.8 G/DL (ref 28.4–34.8)
MCV RBC AUTO: 91.5 FL (ref 82.6–102.9)
MONOCYTES # BLD: 8 % (ref 3–12)
NRBC AUTOMATED: 0 PER 100 WBC
PDW BLD-RTO: 16 % (ref 11.8–14.4)
PLATELET # BLD AUTO: 196 K/UL (ref 138–453)
PMV BLD AUTO: 9.8 FL (ref 8.1–13.5)
POTASSIUM SERPL-SCNC: 4 MMOL/L (ref 3.7–5.3)
RBC # BLD: 2.82 M/UL (ref 3.95–5.11)
RBC # BLD: ABNORMAL 10*6/UL
SEG NEUTROPHILS: 53 % (ref 36–65)
SEGMENTED NEUTROPHILS ABSOLUTE COUNT: 4.5 K/UL (ref 1.5–8.1)
SODIUM SERPL-SCNC: 137 MMOL/L (ref 135–144)
TRANSFUSION STATUS: NORMAL
UNIT DIVISION: 0
UNIT ISSUE DATE/TIME: NORMAL
WBC # BLD AUTO: 8.5 K/UL (ref 3.5–11.3)

## 2023-02-06 PROCEDURE — 82947 ASSAY GLUCOSE BLOOD QUANT: CPT

## 2023-02-06 PROCEDURE — 7100000001 HC PACU RECOVERY - ADDTL 15 MIN: Performed by: INTERNAL MEDICINE

## 2023-02-06 PROCEDURE — 7100000000 HC PACU RECOVERY - FIRST 15 MIN: Performed by: INTERNAL MEDICINE

## 2023-02-06 PROCEDURE — 2580000003 HC RX 258: Performed by: INTERNAL MEDICINE

## 2023-02-06 PROCEDURE — 2580000003 HC RX 258

## 2023-02-06 PROCEDURE — 36415 COLL VENOUS BLD VENIPUNCTURE: CPT

## 2023-02-06 PROCEDURE — 6360000002 HC RX W HCPCS: Performed by: NURSE ANESTHETIST, CERTIFIED REGISTERED

## 2023-02-06 PROCEDURE — 6360000002 HC RX W HCPCS

## 2023-02-06 PROCEDURE — 99223 1ST HOSP IP/OBS HIGH 75: CPT | Performed by: INTERNAL MEDICINE

## 2023-02-06 PROCEDURE — C9113 INJ PANTOPRAZOLE SODIUM, VIA: HCPCS

## 2023-02-06 PROCEDURE — 80048 BASIC METABOLIC PNL TOTAL CA: CPT

## 2023-02-06 PROCEDURE — C9113 INJ PANTOPRAZOLE SODIUM, VIA: HCPCS | Performed by: INTERNAL MEDICINE

## 2023-02-06 PROCEDURE — 36430 TRANSFUSION BLD/BLD COMPNT: CPT

## 2023-02-06 PROCEDURE — 2709999900 HC NON-CHARGEABLE SUPPLY: Performed by: INTERNAL MEDICINE

## 2023-02-06 PROCEDURE — 85014 HEMATOCRIT: CPT

## 2023-02-06 PROCEDURE — 88305 TISSUE EXAM BY PATHOLOGIST: CPT

## 2023-02-06 PROCEDURE — 85025 COMPLETE CBC W/AUTO DIFF WBC: CPT

## 2023-02-06 PROCEDURE — 1200000000 HC SEMI PRIVATE

## 2023-02-06 PROCEDURE — 6360000002 HC RX W HCPCS: Performed by: INTERNAL MEDICINE

## 2023-02-06 PROCEDURE — 2060000000 HC ICU INTERMEDIATE R&B

## 2023-02-06 PROCEDURE — 3609012400 HC EGD TRANSORAL BIOPSY SINGLE/MULTIPLE: Performed by: INTERNAL MEDICINE

## 2023-02-06 PROCEDURE — 3700000000 HC ANESTHESIA ATTENDED CARE: Performed by: INTERNAL MEDICINE

## 2023-02-06 PROCEDURE — 6370000000 HC RX 637 (ALT 250 FOR IP)

## 2023-02-06 PROCEDURE — 0DB78ZX EXCISION OF STOMACH, PYLORUS, VIA NATURAL OR ARTIFICIAL OPENING ENDOSCOPIC, DIAGNOSTIC: ICD-10-PCS | Performed by: INTERNAL MEDICINE

## 2023-02-06 PROCEDURE — 85018 HEMOGLOBIN: CPT

## 2023-02-06 PROCEDURE — 2500000003 HC RX 250 WO HCPCS: Performed by: NURSE ANESTHETIST, CERTIFIED REGISTERED

## 2023-02-06 RX ORDER — FENTANYL CITRATE 50 UG/ML
50 INJECTION, SOLUTION INTRAMUSCULAR; INTRAVENOUS EVERY 5 MIN PRN
Status: DISCONTINUED | OUTPATIENT
Start: 2023-02-06 | End: 2023-02-06 | Stop reason: HOSPADM

## 2023-02-06 RX ORDER — SODIUM CHLORIDE 0.9 % (FLUSH) 0.9 %
5-40 SYRINGE (ML) INJECTION PRN
Status: DISCONTINUED | OUTPATIENT
Start: 2023-02-06 | End: 2023-02-06 | Stop reason: HOSPADM

## 2023-02-06 RX ORDER — LIDOCAINE HYDROCHLORIDE 10 MG/ML
INJECTION, SOLUTION EPIDURAL; INFILTRATION; INTRACAUDAL; PERINEURAL PRN
Status: DISCONTINUED | OUTPATIENT
Start: 2023-02-06 | End: 2023-02-06 | Stop reason: SDUPTHER

## 2023-02-06 RX ORDER — SODIUM CHLORIDE 9 MG/ML
INJECTION, SOLUTION INTRAVENOUS CONTINUOUS
Status: DISCONTINUED | OUTPATIENT
Start: 2023-02-06 | End: 2023-02-06 | Stop reason: HOSPADM

## 2023-02-06 RX ORDER — SODIUM CHLORIDE 9 MG/ML
INJECTION, SOLUTION INTRAVENOUS PRN
Status: DISCONTINUED | OUTPATIENT
Start: 2023-02-06 | End: 2023-02-06 | Stop reason: HOSPADM

## 2023-02-06 RX ORDER — FENTANYL CITRATE 50 UG/ML
25 INJECTION, SOLUTION INTRAMUSCULAR; INTRAVENOUS EVERY 5 MIN PRN
Status: DISCONTINUED | OUTPATIENT
Start: 2023-02-06 | End: 2023-02-06 | Stop reason: HOSPADM

## 2023-02-06 RX ORDER — PROPOFOL 10 MG/ML
INJECTION, EMULSION INTRAVENOUS PRN
Status: DISCONTINUED | OUTPATIENT
Start: 2023-02-06 | End: 2023-02-06 | Stop reason: SDUPTHER

## 2023-02-06 RX ORDER — SODIUM CHLORIDE 0.9 % (FLUSH) 0.9 %
5-40 SYRINGE (ML) INJECTION EVERY 12 HOURS SCHEDULED
Status: DISCONTINUED | OUTPATIENT
Start: 2023-02-06 | End: 2023-02-06 | Stop reason: HOSPADM

## 2023-02-06 RX ORDER — ONDANSETRON 2 MG/ML
4 INJECTION INTRAMUSCULAR; INTRAVENOUS
Status: DISCONTINUED | OUTPATIENT
Start: 2023-02-06 | End: 2023-02-06 | Stop reason: HOSPADM

## 2023-02-06 RX ADMIN — LIDOCAINE HYDROCHLORIDE 80 MG: 10 INJECTION, SOLUTION EPIDURAL; INFILTRATION; INTRACAUDAL; PERINEURAL at 12:08

## 2023-02-06 RX ADMIN — SODIUM CHLORIDE, PRESERVATIVE FREE 40 MG: 5 INJECTION INTRAVENOUS at 03:33

## 2023-02-06 RX ADMIN — SODIUM CHLORIDE, POTASSIUM CHLORIDE, SODIUM LACTATE AND CALCIUM CHLORIDE: 600; 310; 30; 20 INJECTION, SOLUTION INTRAVENOUS at 00:54

## 2023-02-06 RX ADMIN — SODIUM CHLORIDE, PRESERVATIVE FREE 40 MG: 5 INJECTION INTRAVENOUS at 15:40

## 2023-02-06 RX ADMIN — PROPOFOL 90 MG: 10 INJECTION, EMULSION INTRAVENOUS at 12:08

## 2023-02-06 RX ADMIN — SODIUM CHLORIDE: 9 INJECTION, SOLUTION INTRAVENOUS at 03:25

## 2023-02-06 RX ADMIN — SODIUM CHLORIDE, PRESERVATIVE FREE 10 ML: 5 INJECTION INTRAVENOUS at 20:44

## 2023-02-06 RX ADMIN — SODIUM CHLORIDE: 9 INJECTION, SOLUTION INTRAVENOUS at 10:40

## 2023-02-06 RX ADMIN — DESMOPRESSIN ACETATE 40 MG: 0.2 TABLET ORAL at 07:58

## 2023-02-06 RX ADMIN — SODIUM CHLORIDE, POTASSIUM CHLORIDE, SODIUM LACTATE AND CALCIUM CHLORIDE: 600; 310; 30; 20 INJECTION, SOLUTION INTRAVENOUS at 07:57

## 2023-02-06 ASSESSMENT — PAIN - FUNCTIONAL ASSESSMENT
PAIN_FUNCTIONAL_ASSESSMENT: NONE - DENIES PAIN
PAIN_FUNCTIONAL_ASSESSMENT: ACTIVITIES ARE NOT PREVENTED

## 2023-02-06 ASSESSMENT — PAIN SCALES - GENERAL
PAINLEVEL_OUTOF10: 0
PAINLEVEL_OUTOF10: 4

## 2023-02-06 ASSESSMENT — PAIN DESCRIPTION - DESCRIPTORS: DESCRIPTORS: ACHING;DULL

## 2023-02-06 ASSESSMENT — PAIN DESCRIPTION - ONSET: ONSET: ON-GOING

## 2023-02-06 ASSESSMENT — ENCOUNTER SYMPTOMS: SHORTNESS OF BREATH: 1

## 2023-02-06 ASSESSMENT — PAIN DESCRIPTION - ORIENTATION: ORIENTATION: LOWER

## 2023-02-06 ASSESSMENT — PAIN DESCRIPTION - PAIN TYPE: TYPE: CHRONIC PAIN

## 2023-02-06 ASSESSMENT — PAIN DESCRIPTION - LOCATION: LOCATION: BACK

## 2023-02-06 ASSESSMENT — PAIN DESCRIPTION - FREQUENCY: FREQUENCY: CONTINUOUS

## 2023-02-06 NOTE — PROGRESS NOTES
[address]   Occupational Therapy Not Seen Note    DATE: 2023    NAME: Warden Roach  MRN: [id number]   : 1945      Patient not seen this date for Occupational Therapy due to:    Spoke directly with patient, patient reports no OT needs at this time. Pt able to complete ADLs at baseline and is using bathroom independently. Pt declined therapy evaluation. Patient independent with ADLs and functional tasks with no acute OT needs. Will defer OT evaluation at this time. Please reorder OT if future needs arise.          Electronically signed by Dameon Millard OT on 2023 at 2:39 PM

## 2023-02-06 NOTE — PLAN OF CARE
Problem: Discharge Planning  Goal: Discharge to home or other facility with appropriate resources  2/6/2023 1608 by Darian Rob RN  Outcome: Progressing  2/6/2023 0332 by Bria Thorne RN  Outcome: Progressing     Problem: Pain  Goal: Verbalizes/displays adequate comfort level or baseline comfort level  2/6/2023 1608 by Darian Rob RN  Outcome: Progressing  2/6/2023 0332 by Bria Thorne RN  Outcome: Progressing     Problem: Safety - Adult  Goal: Free from fall injury  2/6/2023 1608 by Darian Rob RN  Outcome: Progressing  2/6/2023 0332 by Bria Thorne RN  Outcome: Progressing

## 2023-02-06 NOTE — PROGRESS NOTES
Physical Therapy        Physical Therapy Cancel Note      DATE: 2023    NAME: Yeny Ortiz  MRN: [id number]   : 1945      Patient not seen this date for Physical Therapy due to:    Surgery/Procedure: pt taken by transport for EGD. Will check back in afternoon  if able. If not will check back .       Electronically signed by Olimpia Jefferson on 2023 at 10:07 AM

## 2023-02-06 NOTE — PROGRESS NOTES
[facility])  Occupational Therapy Not Seen Note    DATE: 2023    NAME: Rosina Pope  MRN: [id number]   : 1945      Patient not seen this date for Occupational Therapy due to:    Surgery/Procedure: EGD     Next Scheduled Treatment: Ck in pm or      Electronically signed by Governor ROSELINE Pham on 2023 at 10:06 AM

## 2023-02-06 NOTE — PLAN OF CARE
Problem: Discharge Planning  Goal: Discharge to home or other facility with appropriate resources  2/6/2023 0332 by Mendel Kitchens, RN  Outcome: Progressing  2/5/2023 1624 by Parag Anthony RN  Outcome: Progressing     Problem: Pain  Goal: Verbalizes/displays adequate comfort level or baseline comfort level  2/6/2023 0332 by Mendel Kitchens, RN  Outcome: Progressing  2/5/2023 1624 by Parag Anthony RN  Outcome: Progressing     Problem: Safety - Adult  Goal: Free from fall injury  2/6/2023 0332 by Mendel Kitchens, RN  Outcome: Progressing  2/5/2023 1624 by Parag Anthony RN  Outcome: Progressing

## 2023-02-06 NOTE — CONSENT
Informed Consent for Blood Component Transfusion Note    I have discussed with the patient the rationale for blood component transfusion; its benefits in treating or preventing fatigue, organ damage, or death; and its risk which includes mild transfusion reactions, rare risk of blood borne infection, or more serious but rare reactions. I have discussed the alternatives to transfusion, including the risk and consequences of not receiving transfusion. The patient had an opportunity to ask questions and had agreed to proceed with transfusion of blood components. Justice Rudolph MD  Internal Medicine Resident, PGY- [address], [place]  2/5/2023, 8:38 PM

## 2023-02-06 NOTE — PROGRESS NOTES
[facility]  Internal Medicine Teaching Residency Program  Inpatient Daily Progress Note  ______________________________________________________________________________    Patient: Phani Bowers  YOB: 1945   PORTIA:[id number]    Acct: [de-identified]     Room: 2023/2023-01  Admit date: 2/5/2023  Today's date: 02/06/23  Number of days in the hospital: 1    SUBJECTIVE   Admitting Diagnosis: Upper GI bleed  CC: Black tarry stools, generalized weakness. Pt examined at bedside. Chart & results reviewed. Overnight, patient dropped back to bed from 7.7-6.3. Occult bleeding, no signs of active external bleeding. No bowel movements. Patient being transfused 1 unit of PRBC. Repeat hemoglobin 7.7. Patient is hemodynamically stable, breathing on room air. ROS:  Constitutional:  negative for chills, fevers, sweats  Respiratory:  negative for cough, dyspnea on exertion, hemoptysis, shortness of breath, wheezing  Cardiovascular:  negative for chest pain, chest pressure/discomfort, lower extremity edema, palpitations  Gastrointestinal:  negative for abdominal pain, constipation, diarrhea, nausea, vomiting  Neurological:  negative for dizziness, headache  BRIEF HISTORY        The patient is a [address] y.o. female with PMH of T2DM, HTN, HFpEF chronic back pain presented to ED with complaints of black tarry stools. Per patient, she was completely altered at baseline mental 2nd February when she developed black tarry stools on fourth and fifth of February. Patient had total of 4 episodes in these 2 days where the stools were formed but extremely black. No bright red blood per rectum noted. No hematemesis. Patient denies taking any anticoagulant or iron supplements. Patient quit smoking 25 years ago, does not drink does not use any recreational drug.   Per patient, black starry stools started on its own and afterwards, patient was feeding bloated with generalized abdominal ache with loss of appetite. Patient did vomit on Wednesday. No nausea, chest pain, shortness of breath. Patient has been feeling generalized weakness. In ED, work-up showed none anion gap metabolic acidosis with bicarb of 17, ROSELIA with creatinine of 1.09, hemoglobin dropped to 7.7 (9 in December). No signs of active bleeding noted. CT abdomen and pelvis was done which was unremarkable. Internal medicine and GI was consulted from ER for evaluation of occult GI bleed. OBJECTIVE     Vital Signs:  /64   Pulse 89   Temp 99.2 °F (37.3 °C) (Oral)   Resp 14   Ht 5' (1.524 m)   Wt 166 lb (75.3 kg)   SpO2 96%   BMI 32.42 kg/m²     Temp (24hrs), Av.8 °F (37.1 °C), Min:97.6 °F (36.4 °C), Max:99.6 °F (37.6 °C)    In: 3149   Out: -     Physical Exam:  Constitutional: This is a well developed, well nourished,  68y.o. year old female who is alert, oriented, cooperative and in no apparent distress. Head:normocephalic and atraumatic. EENT:  PERRLA. Conjunctiva pale. Septum was midline, mucosa was without erythema, exudates or cobblestoning. No thrush was noted. Neck: Supple without thyromegaly. No elevated JVP. Trachea was midline. Respiratory: Chest was symmetrical without dullness to percussion. Breath sounds bilaterally were clear to auscultation. There were no wheezes, rhonchi or rales. There is no intercostal retraction or use of accessory muscles. No egophony noted. Cardiovascular: Regular without murmur, clicks, gallops or rubs. Abdomen: Unremarkable examination. ANGEL negative for fresh blood. Lymphatic: No lymphadenopathy. Musculoskeletal: Normal curvature of the spine. No gross muscle weakness. Extremities:  No lower extremity edema, ulcerations, tenderness, varicosities or erythema. Muscle size, tone and strength are normal.  No involuntary movements are noted. Skin:  Warm and dry. Good color, turgor and pigmentation. No lesions or scars.   No cyanosis or clubbing  Neurological/Psychiatric: The patient's general behavior, level of consciousness, thought content and emotional status is normal.        Medications:  Scheduled Medications:    pantoprazole (PROTONIX) 40 mg injection  40 mg IntraVENous Q12H    atorvastatin  40 mg Oral Daily    sodium chloride flush  5-40 mL IntraVENous 2 times per day    insulin lispro  0-4 Units SubCUTAneous TID WC    insulin lispro  0-4 Units SubCUTAneous Nightly     Continuous Infusions:    sodium chloride      lactated ringers IV soln 125 mL/hr at 02/06/23 0054    dextrose       PRN MedicationstraMADol, 50 mg, Q6H PRN  sodium chloride flush, 5-40 mL, PRN  sodium chloride, , PRN  ondansetron, 4 mg, Q8H PRN   Or  ondansetron, 4 mg, Q6H PRN  acetaminophen, 650 mg, Q6H PRN   Or  acetaminophen, 650 mg, Q6H PRN  glucose, 4 tablet, PRN  dextrose bolus, 125 mL, PRN   Or  dextrose bolus, 250 mL, PRN  glucagon (rDNA), 1 mg, PRN  dextrose, , Continuous PRN        Diagnostic Labs:  CBC:   Recent Labs     02/05/23  1215 02/05/23 1956 02/06/23  0433   WBC 10.2  --  8.5   RBC 2.78*  --  2.82*   HGB 7.7* 6.3* 7.7*   HCT 24.8* 20.8* 25.8*   MCV 89.2  --  91.5   RDW 17.3*  --  16.0*     --  196     BMP:   Recent Labs     02/05/23  1215 02/06/23  0433    137   K 3.6* 4.0    109*   CO2 17* 16*   BUN 33* 22   CREATININE 1.09* 0.96*     BNP: No results for input(s): BNP in the last 72 hours. PT/INR: No results for input(s): PROTIME, INR in the last 72 hours. APTT: No results for input(s): APTT in the last 72 hours. CARDIAC ENZYMES: No results for input(s): CKMB, CKMBINDEX, TROPONINI in the last 72 hours.     Invalid input(s): CKTOTAL;3  FASTING LIPID PANEL:  Lab Results   Component Value Date    CHOL 140 05/04/2020    HDL 53 12/03/2022    TRIG 100 05/04/2020     LIVER PROFILE:   Recent Labs     02/05/23  1215   AST 32*   ALT 13   BILITOT 0.4   ALKPHOS 55      MICROBIOLOGY: No results found for: CULTURE    Imaging:    CT ABDOMEN PELVIS WO CONTRAST Additional Contrast? None    Result Date: 2/5/2023  No acute abdominal or pelvic abnormality. ASSESSMENT & PLAN     IMPRESSION  This is a 68 y.o. female who presented with black tarry stools and generalized abdominal pain and found to have acute blood loss anemia with hemoglobin of 7.7 likely secondary to occult GI bleed. Occult GI bleed  Patient had a colonoscopy done in 2021 that showed multiple polyps status post resection/polypectomy along with internal hemorrhoids. Patient was recommended to have a repeat colonoscopy in 6 months. 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 Currently no signs of active bleeding. H&H stable at 7.7  Will repeat H&H every 6 hours. Transfuse blood as needed. Keep clear liquid diet for now. GI consult placed, planning EGD today. Type 2 diabetes mellitus-controlled  Last A1c 6.5 in December 2022. Patient takes metformin and glimepiride at home. This patient has not eating and drinking actively, will hold on home medications and start sliding scale. Will adjust insulin as needed. Hypertension-controlled  Patient takes valsartan hydrochlorothiazide at home. Will hold for now as blood pressure is borderline. Bilateral carotid stenosis  She follows with vascular for Carotid artery stenosis and is suppose to obtain CTA but is waiting to see nephrology. She has had a steady incline in Cr over years as far back as 2011 strongly suggestive for CKD. Currently creatinine 1.09. HFpEF-at baseline-compensated  Echo done August 2021 shows EF of 54 with G1 DD. Right atrium mildly dilated, trivial MR. Patient takes valsartan HCTZ, aspirin at home. Will hold for now, monitor        DVT ppx: EPC cuff  GI ppx: Protonix  Diet: Clear liquid  PT/OT/SW: Consulted  Discharge Planning: Pending       Last Alcaraz MD  Internal Medicine Resident, PGY-1  [address], [place]  2/6/2023, 5:34 AM   Attending Physician Statement    I have discussed the case of Vijaya Collado, including pertinent history and exam findings with the resident. I have seen and examined the patient and the key elements of the encounter have been performed by me. I agree with the assessment, plan, and orders as documented by the resident. The patient presented with melena stools and an EGD she was found to have duodenal ulcer which is not bleeding at present. She is on PPIs. She can be discharged tomorrow if she is stable. He is to be noted that she was taking an NSAID prior to this admission. She was examined by me soon after her EGD and she was stable with no cardiorespiratory symptoms and her abdomen was soft and nontender.   She did give family history of colon cancer but her colonoscopy done in 2021 was normal.  Electronically signed by Kirk Doyle MD on 2/6/2023 at 1:44 PM

## 2023-02-06 NOTE — ANESTHESIA PRE PROCEDURE
Department of Anesthesiology  Preprocedure Note       Name:  Gale Cates   Age:  68 y.o.  :  1945                                          MRN:  [id number]         Date:  2023      Surgeon: Clayton Fraser):  Angélica Montana MD    Procedure: Procedure(s):  EGD ESOPHAGOGASTRODUODENOSCOPY    Medications prior to admission:   Prior to Admission medications    Medication Sig Start Date End Date Taking?  Authorizing Provider   metFORMIN (GLUCOPHAGE) 1000 MG tablet TAKE ONE TABLET BY MOUTH TWICE A DAY WITH MEALS 23   Sonu Veliz MD   fluticasone (FLOVENT HFA) 110 MCG/ACT inhaler Inhale 2 puffs into the lungs 2 times daily 23   Eloy Dover MD   fluticasone (FLONASE) 50 MCG/ACT nasal spray 2 sprays by Nasal route daily 23   Eloy Dover MD   valsartan-hydroCHLOROthiazide (DIOVAN-HCT) 160-12.5 MG per tablet TAKE ONE TABLET BY MOUTH DAILY 22   Sonu Veliz MD   simvastatin (ZOCOR) 40 MG tablet TAKE ONE TABLET BY MOUTH ONCE NIGHTLY 22   Sonu Veliz MD   glimepiride (AMARYL) 4 MG tablet Take 1 tablet by mouth every morning (before breakfast) 22   Sonu Veliz MD   citalopram (CELEXA) 40 MG tablet TAKE ONE TABLET BY MOUTH DAILY 22   Sonu Veliz MD   Latanoprost 0.005 % EMUL latanoprost 0.005 % eye drops    Historical Provider, MD   albuterol sulfate  (90 Base) MCG/ACT inhaler Inhale 2 puffs into the lungs every 6 hours as needed for Wheezing 12/3/19   Eloy Dover MD   acetaminophen (TYLENOL) 500 MG tablet Take 500 mg by mouth    Historical Provider, MD   aspirin 81 MG tablet Take 81 mg by mouth daily    Historical Provider, MD       Current medications:    Current Facility-Administered Medications   Medication Dose Route Frequency Provider Last Rate Last Admin    0.9 % sodium chloride infusion   IntraVENous Continuous Angélica Montana  mL/hr at 23 1040 New Bag at 23 1040    [MAR Hold] pantoprazole (PROTONIX) 40 mg in sodium chloride (PF) 0.9 % 10 mL injection  40 mg IntraVENous Q12H Alex Li MD   40 mg at 02/06/23 0333    [MAR Hold] atorvastatin (LIPITOR) tablet 40 mg  40 mg Oral Daily Alex Li MD   40 mg at 02/06/23 0758    [MAR Hold] traMADol (ULTRAM) tablet 50 mg  50 mg Oral Q6H PRN Alex Li MD        [facility] Hold] sodium chloride flush 0.9 % injection 5-40 mL  5-40 mL IntraVENous 2 times per day Alex Li MD   10 mL at 02/05/23 2229    [MAR Hold] sodium chloride flush 0.9 % injection 5-40 mL  5-40 mL IntraVENous PRN Alex Li MD        [facility] Hold] 0.9 % sodium chloride infusion   IntraVENous PRN Alex Li MD        [facility] Hold] ondansetron (ZOFRAN-ODT) disintegrating tablet 4 mg  4 mg Oral Q8H PRN Alex Li MD        Or    [facility] Hold] ondansetron [facility]) injection 4 mg  4 mg IntraVENous Q6H PRN Alex Li MD   4 mg at 02/05/23 2226    [MAR Hold] acetaminophen (TYLENOL) tablet 650 mg  650 mg Oral Q6H PRN Alex Li MD        Or    [facility] Hold] acetaminophen (TYLENOL) suppository 650 mg  650 mg Rectal Q6H PRN Alex Li MD        [facility] Hold] lactated ringers IV soln infusion   IntraVENous Continuous Alex Li  mL/hr at 02/06/23 0757 New Bag at 02/06/23 0757    [MAR Hold] glucose chewable tablet 16 g  4 tablet Oral PRN Alex Li MD        [facility] Hold] dextrose bolus 10% 125 mL  125 mL IntraVENous PRN Alex Li MD        Or   [facility] Hold] dextrose bolus 10% 250 mL  250 mL IntraVENous PRN Alex Li MD        [facility] Hold] glucagon (rDNA) injection 1 mg  1 mg SubCUTAneous PRN Alex Li MD        [facility] Hold] dextrose 10 % infusion   IntraVENous Continuous PRN Alex Li MD        [facility] Hold] insulin lispro (HUMALOG) injection vial 0-4 Units  0-4 Units SubCUTAneous TID KAYLNA Li MD        [facility] Hold] insulin lispro (HUMALOG) injection vial 0-4 Units  0-4 Units SubCUTAneous Ranulfo Rudolph MD           Allergies:     Allergies   Allergen Reactions    Aspirin Other (See Comments)     nausea       Problem List:    Patient Active Problem List   Diagnosis Code    S/P total knee arthroplasty Z96.659    Type 2 diabetes mellitus with diabetic nephropathy, without long-term current use of insulin ([facility]) E11.21    Hypertension I10    Asthma J45.909    Arthritis M19.90    Acquired trigger finger M65.30    Osteoarthritis of knee M17.9    Morbid obesity with BMI of 40.0-44.9, adult ([facility]) E66.01, Z68.41    Current mild episode of major depressive disorder without prior episode ([facility] F32.0    Bilateral carotid artery stenosis I65.23    Other specified glaucoma H40.89    Chronic systolic (congestive) heart failure I50.22    Lumbar radiculopathy M54.16    Lumbar degenerative disc disease M51.36    Lumbar spondylosis M47.816    Chronic renal disease, stage III ([facility] [[id number]] N18.30    Upper GI bleed K92.2       Past Medical History:        Diagnosis Date    Acquired trigger finger 4/27/2018    [facility]IA [facility] 2011    Allergic rhinitis     Anesthesia complication     after or bp dropped kidney labs elevated for few years    Anxiety     Arthritis     Asthma     Carotid stenosis, asymptomatic     16-49%    CKD (chronic kidney disease) stage 3, GFR 30-59 ml/min ([facility])     Diabetes mellitus ([facility])     Dry skin     Dyspnea     Frequency of urination     History of bronchitis     Hyperkalemia 1/24/2014    Hypertension     Knee pain, left     Obesity     Obsessive compulsive disorder     Osteoarthritis of knee 4/27/2018    Post-nasal drip     S/P total knee arthroplasty 1/24/2014    Sleep apnea     Urgency incontinence     Urinary, incontinence, stress female     Wears glasses     Wears partial dentures     upper       Past Surgical History:        Procedure Laterality Date    BACK SURGERY      CARPAL TUNNEL RELEASE Bilateral     10 yrs ago    CATARACT REMOVAL WITH IMPLANT Bilateral     COLONOSCOPY  2021    COLONOSCOPY POLYPECTOMY HOT BIOPSY performed by [facility]el [facility]out MD at [facility]    COLONOSCOPY  2021    COLONOSCOPY W/ ENDOSCOPIC MUCOSAL RESECTION performed by Axel Stout MD at [facility]    COLONOSCOPY  2021    COLONOSCOPY WITH BIOPSY performed [facility] at [facility] DISCECTOMY      TONSILLECTOMY      TOTAL KNEE ARTHROPLASTY Right     TOTAL KNEE ARTHROPLASTY      TOTAL KNEE ARTHROPLASTY Left 2014       Social History:    Social History     Tobacco Use    Smoking status: Former     Packs/day: 1.00     Types: Cigarettes     Quit date: 1998     Years since quittin.1    Smokeless tobacco: Never   Substance Use Topics    Alcohol use: Yes     Comment: occas                                Counseling given: Not Answered      Vital Signs (Current):   Vitals:    23 0402 23 0500 23 0700 23 1021   BP: (!) 120/57 117/64 110/62 114/88   Pulse: 91 89 93 90   Resp:    Temp:   98.7 °F (37.1 °C) 97 °F (36.1 °C)   TempSrc:   Oral Temporal   SpO2:  96%  97%   Weight:       Height:                                                  BP Readings from Last 3 Encounters:   23 114/88   23 109/66   23 (!) 142/65       NPO Status: Time of last liquid consumption: 2250                        Time of last solid consumption: 1800                        Date of last liquid consumption: 23                        Date of last solid food consumption: 23    BMI:   Wt Readings from Last 3 Encounters:   23 166 lb (75.3 kg)   23 166 lb 12.8 oz (75.7 kg)   23 172 lb (78 kg)     Body mass index is 32.42 kg/m².     CBC:   Lab Results   Component Value Date/Time    WBC 8.5 2023 04:33 AM    RBC 2.82 2023 04:33 AM    HGB 7.8 02/06/2023 08:57 AM    HCT 24.1 02/06/2023 08:57 AM    MCV 91.5 02/06/2023 04:33 AM    RDW 16.0 02/06/2023 04:33 AM     02/06/2023 04:33 AM       CMP:   Lab Results   Component Value Date/Time     02/06/2023 04:33 AM    K 4.0 02/06/2023 04:33 AM     02/06/2023 04:33 AM    CO2 16 02/06/2023 04:33 AM    BUN 22 02/06/2023 04:33 AM    CREATININE 0.96 02/06/2023 04:33 AM    GFRAA 53 09/10/2020 01:49 PM    LABGLOM >60 02/06/2023 04:33 AM    GLUCOSE 136 02/06/2023 04:33 AM    GLUCOSE 85 09/30/2011 09:25 AM    PROT 7.0 02/05/2023 12:15 PM    CALCIUM 8.7 02/06/2023 04:33 AM    BILITOT 0.4 02/05/2023 12:15 PM    ALKPHOS 55 02/05/2023 12:15 PM    AST 32 02/05/2023 12:15 PM    ALT 13 02/05/2023 12:15 PM       POC Tests:   Recent Labs     02/06/23  0828   POCGLU 131*       Coags: No results found for: PROTIME, INR, APTT    HCG (If Applicable): No results found for: PREGTESTUR, PREGSERUM, HCG, HCGQUANT     ABGs: No results found for: PHART, PO2ART, TEJ5WEU, PTI3DFI, BEART, Y3OQEFKD     Type & Screen (If Applicable):  No results found for: LABABO, LABRH    Drug/Infectious Status (If Applicable):  Lab Results   Component Value Date/Time    HEPCAB NONREACTIVE 11/05/2019 09:33 AM       COVID-19 Screening (If Applicable): No results found for: COVID19    TTE  5/2021  Left ventricle is normal in size, normal wall thickness, global left  ventricular systolic function is normal 55%. Evidence of mild (grade I) diastolic dysfunction. Right atrium is mildly dilated . Right ventricular dilatation with normal systolic function. Normal mitral valve structure. Mitral annular calcification is seen. Trivial mitral regurgitation.       Anesthesia Evaluation  Patient summary reviewed no history of anesthetic complications:   Airway: Mallampati: III  TM distance: >3 FB   Neck ROM: full  Mouth opening: > = 3 FB   Dental:    (+) upper dentures      Pulmonary:   (+) shortness of breath: no interval change,  sleep apnea: on CPAP,  decreased breath sounds asthma: seasonal asthma,                            Cardiovascular:    (+) hypertension: no interval change, CHF:,       ECG reviewed  Rhythm: regular  Rate: normal                    Neuro/Psych:   (+) neuromuscular disease:, psychiatric history:depression/anxiety             GI/Hepatic/Renal: Neg GI/Hepatic/Renal ROS            Endo/Other:    (+) DiabetesType II DM, no interval change, , .                 Abdominal:   (+) obese,           Vascular:   + PVD, aortic or cerebral, . Other Findings:             Anesthesia Plan      MAC     ASA 3       Induction: intravenous. MIPS: Postoperative opioids intended. Anesthetic plan and risks discussed with patient. Plan discussed with CRNA.                     Darleen Lanza MD   2/6/2023

## 2023-02-06 NOTE — OP NOTE
EGD      Patient:   Kellie Collado    :    1945    Facility:   [address] [place] [address]   Referring/PCP: Morena Cervantes MD    Procedure:   Esophagogastroduodenoscopy with biopsy  Date:     2023   Endoscopist:  Tony Guzman MD, [facility]    Indication:   Melena    Postprocedure diagnosis:   Multiple duodenal ulcers, gastritis    Anesthesia:  MAC    Complications: None    EBL: None from the procedure    Specimen: sent to the lab    Description of Procedure:  Informed consent was obtained from the patient after explanation of the procedure including indications, description of the procedure,  benefits and possible risks and complications of the procedure, and alternatives. Questions were answered. The patient's history was reviewed and a directed physical examination was performed prior to the procedure. Patient was monitored throughout the procedure with pulse oximetry and periodic assessment of vital signs. Patient was sedated as noted above. With the patient in the left lateral decubitus position, the Olympus videoendoscope was placed in the patient's mouth and under direct visualization passed into the esophagus. Visualization of the esophagus, stomach, and duodenum was performed during both introduction and withdrawal of the endoscope and retroflexed view of the proximal stomach was obtained. The scope was passed to the 2nd portion of the duodenum. The patient tolerated the procedure well and was taken to the recovery area in good condition. Findings[de-identified]   Esophagus: Normal.   Stomach: Mild gastric erythema in antrum. This was biopsied with cold biopsy forceps. Stomach otherwise normal.   Duodenum: Multiple clean based duodenal ulcers between 1 and 2 nd portion of duodenum with no high risk stigmata likely the source of recent bleeding.  Duodenum otherwise normal.     Recommendations: -Acid suppression with a proton pump inhibitor BID for 8 weeks,   -Await pathology.  -Avoid NSAID's.  - Patient was counseled to get colonoscopy as an outpatient for f/u of large polyp in cecum resected in 2021. She states that she doesn't like to take prep but will f/u with her gastroenterologist.   OK to discharge from GI standpoint.      Electronically signed by Angélica Montana MD, FACG  on 2/6/2023 at 12:15 PM

## 2023-02-06 NOTE — CONSULTS
[facility]    GASTROENTEROLOGY CONSULT    Patient:   Natalie Collado   :    1945   Facility:   [facility]   Date:    2023  Admission Dx:  Anemia [D64.9]  Upper GI bleed [K92.2]  Gastrointestinal hemorrhage, unspecified gastrointestinal hemorrhage type [K92.2]  Requesting physician: Jaime [facility] MD  Reason for consult:  GI bleed   CC : \"dark tarry stools\"    SUBJECTIVE     HISTORY OF PRESENT ILLNESS  This is a 68 y.o. female pmh asthma, hypertension, bilateral carotid stenosis, CKD stage III, who was admitted 2023 with Anemia [D64.9]  Upper GI bleed [K92.2]  Gastrointestinal hemorrhage, unspecified gastrointestinal hemorrhage type [K92.2]. We have been asked to see the patient in consultation by Jaime [facility] MD for dark tarry stool starting last Wednesday. Patient states that she started having 3-4 episodes of dark tarry stools and then started having lightheadedness starting Wednesday. Patient states that she is never had dark tarry stools before. Hemoglobin was 7.7 upon arrival, hemoglobin subsequently then dropped down to 6.3 in which she was given 1 unit of blood. Hemoglobin is now 7.7. Denies abdominal pain and nausea vomiting. Denies any iron supplements, Pepto-Bismol, anticoagulants. Patient states that she has chronic lower back pain has been taking ibuprofen for the past couple of years to help with the pain. She is unsure of how much and how often she takes it. Patient does take aspirin due to history of bilateral carotid stenosis seen on Doppler in . Colonoscopy in  showed multiple polyps. As well as internal hemorrhoids. CT abdomen pelvis yesterday did not show any acute bleeding. Apparently patient was told to follow-up at 6 months. Denies any chest pain shortness of breath. Patient does not smoke(quit 20+ years ago), no alcohol no illicit drugs.               Summary of imaging completed at this time:  CT abdomen pelvis yesterday did not show any acute bleeding. Summary of labs completed at this time:  Hemoglobin is 7.7  BUN 22  Cr.0.96    Previous GI history:   Colonoscopy in 2021 showed multiple polyps. As well as internal hemorrhoids.        OBJECTIVE:     PAST MEDICAL/SURGICAL HISTORY  Past Medical History:   Diagnosis Date    Acquired trigger finger 4/27/2018    ROSELIA (acute kidney injury) ([facility] [address] 2011    Allergic rhinitis     Anesthesia complication     after or bp dropped kidney labs elevated for few years    Anxiety     Arthritis     Asthma     Carotid stenosis, asymptomatic     16-49%    CKD (chronic kidney disease) stage 3, GFR 30-59 ml/min ([facility])     Diabetes mellitus ([facility])     Dry skin     Dyspnea     Frequency of urination     History of bronchitis     Hyperkalemia 1/24/2014    Hypertension     Knee pain, left     Obesity     Obsessive compulsive disorder     Osteoarthritis of knee 4/27/2018    Post-nasal drip     S/P total knee arthroplasty 1/24/2014    Sleep apnea     Urgency incontinence     Urinary, incontinence, stress female     Wears glasses     Wears partial dentures     upper     Past Surgical History:   Procedure Laterality Date    BACK SURGERY      CARPAL TUNNEL RELEASE Bilateral     10 yrs ago    CATARACT REMOVAL WITH IMPLANT Bilateral     COLONOSCOPY  7/1/2021    COLONOSCOPY POLYPECTOMY HOT BIOPSY performed by Verner Costain, MD at [facility]    COLONOSCOPY  7/1/2021    COLONOSCOPY W/ ENDOSCOPIC MUCOSAL RESECTION performed by Verner Costain, MD at [facility]    COLONOSCOPY  7/1/2021    COLONOSCOPY WITH BIOPSY performed by Verner Costain, MD at [address] [place] [address] DISCECTOMY      TONSILLECTOMY      TOTAL KNEE ARTHROPLASTY Right     TOTAL KNEE ARTHROPLASTY      TOTAL KNEE ARTHROPLASTY Left 01/23/2014       ALLERGIES:  Allergies   Allergen Reactions    Aspirin Other (See Comments)       HOME MEDICATIONS:  Prior to Admission medications    Medication Sig Start Date End Date Taking?  Authorizing Provider   metFORMIN (GLUCOPHAGE) 1000 MG tablet TAKE ONE TABLET BY MOUTH TWICE A DAY WITH MEALS 2/2/23   Roseanna Funes MD   fluticasone (FLOVENT HFA) 110 MCG/ACT inhaler Inhale 2 puffs into the lungs 2 times daily 1/17/23   Haily Enriquez MD   fluticasone (FLONASE) 50 MCG/ACT nasal spray 2 sprays by Nasal route daily 1/17/23   Haily Enriquez MD   valsartan-hydroCHLOROthiazide (DIOVAN-HCT) 160-12.5 MG per tablet TAKE ONE TABLET BY MOUTH DAILY 12/6/22   Roseanna Funes MD   simvastatin (ZOCOR) 40 MG tablet TAKE ONE TABLET BY MOUTH ONCE NIGHTLY 7/5/22   Roseanna Funes MD   glimepiride (AMARYL) 4 MG tablet Take 1 tablet by mouth every morning (before breakfast) 6/21/22   Roseanna Funes MD   citalopram (CELEXA) 40 MG tablet TAKE ONE TABLET BY MOUTH DAILY 2/24/22   Roseanna Funes MD   Latanoprost 0.005 % EMUL latanoprost 0.005 % eye drops    Historical Provider, MD   albuterol sulfate  (90 Base) MCG/ACT inhaler Inhale 2 puffs into the lungs every 6 hours as needed for Wheezing 12/3/19   Haily Enriquez MD   acetaminophen (TYLENOL) 500 MG tablet Take 500 mg by mouth    Historical Provider, MD   aspirin 81 MG tablet Take 81 mg by mouth daily    Historical Provider, MD       CURRENT MEDICATIONS:  Scheduled Meds:   pantoprazole (PROTONIX) 40 mg injection  40 mg IntraVENous Q12H    atorvastatin  40 mg Oral Daily    sodium chloride flush  5-40 mL IntraVENous 2 times per day    insulin lispro  0-4 Units SubCUTAneous TID WC    insulin lispro  0-4 Units SubCUTAneous Nightly     Continuous Infusions:   sodium chloride      lactated ringers IV soln 125 mL/hr at 02/06/23 0757    dextrose       PRN Meds:traMADol, sodium chloride flush, sodium chloride, ondansetron **OR** ondansetron, acetaminophen **OR** acetaminophen, glucose, dextrose bolus **OR** dextrose bolus, glucagon (rDNA), dextrose    SOCIAL HISTORY: Tobacco:   reports that she quit smoking about 25 years ago. Her smoking use included cigarettes. She smoked an average of 1 pack per day. She has never used smokeless tobacco.  Alcohol:   reports current alcohol use. Illicit drugs:  reports no history of drug use. FAMILY HISTORY:     Family History   Problem Relation Age of Onset    Heart Disease Mother     Heart Disease Brother     Stroke Other     Colon Cancer Other     Coronary Art Dis Other        REVIEW OF SYSTEMS:    Constitutional: No fever, no chills, no lethargy, no weakness. HEENT:  No headache, otalgia, itchy eyes, nasal discharge or sore throat. Cardiac:  No chest pain, dyspnea, orthopnea or PND. Chest:   No cough, phlegm or wheezing. Abdomen:  Abdominal discomfort with no n/v  Neuro:  No focal weakness, abnormal movements or seizure like activity. Skin:   No rashes, no itching. :   No hematuria, no pyuria, no dysuria, no flank pain. Extremities:  No swelling or joint pains. ROS was otherwise negative except as mentioned in the [address] PHYSICAL EXAM:    /62   Pulse 93   Temp 98.7 °F (37.1 °C) (Oral)   Resp 19   Ht 5' (1.524 m)   Wt 166 lb (75.3 kg)   SpO2 96%   BMI 32.42 kg/m²     GENERAL:   Well developed, Well nourished, No apparent distress  HEAD:   Normocephalic, Atraumatic  EENT:   EOMI, Sclera not icteric, Oropharynx moist   NECK:   Supple, Trachea midline  LUNGS:  CTA Bilaterally  HEART:  RRR, No murmur  ABDOMEN:   Soft, Nontender, Nondistended, BS WNL  EXT:   No clubbing. No cyanosis. No edema. SKIN:   No rashes. No jaundice. No stigmata of liver disease.     MUSC/SKEL:   Adequate muscle bulk for patient's age, No significant synovitis, No deformities  NEURO:  A&O x Three, CN II- XII grossly intact      LABS AND IMAGING:     CBC  Recent Labs     02/05/23  1215 02/05/23  1956 02/06/23  0433   WBC 10.2  --  8.5   HGB 7.7* 6.3* 7.7*   HCT 24.8* 20.8* 25.8*   MCV 89.2  --  91.5   MCH 27.7  --  27.3   MCHC 31.0  -- 29.8     --  196       IMMATURE PLTs  No results found for: PLTFLUORE    BMP  Recent Labs     02/05/23  1215 02/06/23  0433    137   K 3.6* 4.0    109*   CO2 17* 16*   BUN 33* 22   CREATININE 1.09* 0.96*   GLUCOSE 178* 136*   CALCIUM 9.2 8.7       LFTS  Recent Labs     02/05/23  1215   ALKPHOS 55   BILITOT 0.4   AST 32*   ALT 13   PROT 7.0   LABALBU 3.9       AMYLASE/LIPASE/AMMONIA  Recent Labs     02/05/23  1215   LIPASE 79*       PT/INR  No results for input(s): PROTIME, INR in the last 72 hours. ANEMIA STUDIES  Recent Labs     02/05/23 2110   IRON 18*   LABIRON 5*   TIBC 339   UIBC 321   FERRITIN 20         LIVER WORK UP:    Acute Hepatitis Panel   Lab Results   Component Value Date/Time    HEPCAB NONREACTIVE 11/05/2019 09:33 AM       HCV Genotype   No results found for: HEPATITISCGENOTYPE    HCV Quantitative   No results found for: HCVQNT    AFP  No results found for: AFP    Alpha 1 antitrypsin   No results found for: A1A    Anti - Liver/Kidney Ab  No results found for: LIVER-KIDNEYMICROSOMALAB    ORTEGA  No results found for: ORTEGA    AMA  No results found for: MITOAB    ASMA  No results found for: SMOOTHMUSCAB    Ceruloplasmin  No results found for: CERULOPLSM    Celiac panel  No results found for: TISSTRNTIIGG, TTGIGA, IGA    IgG  No results found for: IGG    IgM  No results found for: IGM    GGT   No results found for: LABGGT    PT/INR  No results for input(s): PROTIME, INR in the last 72 hours.     Cancer Markers:  CEA:  No results found for: CEA  Ca 125:  No results found for:   Ca 19-9:   No results found for:   AFP: No results found for: AFP    Lactic acid:  Recent Labs     02/05/23  2110   LACTACIDWB 2.1           IMAGING  CT ABDOMEN PELVIS WO CONTRAST Additional Contrast? None    Result Date: 2/5/2023  EXAMINATION: CT OF THE ABDOMEN AND PELVIS WITHOUT CONTRAST 2/5/2023 12:10 pm TECHNIQUE: CT of the abdomen and pelvis was performed without the administration of intravenous contrast. Multiplanar reformatted images are provided for review. Automated exposure control, iterative reconstruction, and/or weight based adjustment of the mA/kV was utilized to reduce the radiation dose to as low as reasonably achievable. COMPARISON: None. HISTORY: ORDERING SYSTEM PROVIDED HISTORY: epigastric and suprapubic abdominal pain, concern for GI bleed TECHNOLOGIST PROVIDED HISTORY: epigastric and suprapubic abdominal pain, concern for GI bleed Decision Support Exception - unselect if not a suspected or confirmed emergency medical condition->Emergency Medical Condition (MA) Reason for Exam: epigastric and suprapubic abdominal pain, concern for GI bleed FINDINGS: Lower Chest: The lung bases are without consolidation or effusion. The visualized cardiac structures are unremarkable. Organs: The liver and spleen are normal size and overall attenuation. The gallbladder, pancreas, and adrenal glands are unremarkable. The kidneys are without obstructive uropathy. The urinary bladder is contracted. GI/Bowel: The stomach is unremarkable. Loops of small bowel are normal in caliber without evidence for obstruction. The colon contains air and fecal residue. There is no free air or free fluid. Pelvis: The uterus is age-appropriate. Peritoneum/Retroperitoneum: The psoas muscles are symmetric. The abdominal aorta is normal in caliber. The inferior vena cava is unremarkable. There is no retroperitoneal or mesenteric adenopathy. Bones/Soft Tissues: The extra-abdominal soft tissues are unremarkable. There is no acute osseous abnormality. No acute abdominal or pelvic abnormality. MRI LUMBAR SPINE WO CONTRAST    Result Date: 1/13/2023  EXAMINATION: MRI OF THE LUMBAR SPINE WITHOUT CONTRAST, 1/13/2023 9:52 am TECHNIQUE: Multiplanar multisequence MRI of the lumbar spine was performed without the administration of intravenous contrast. COMPARISON: 02/06/2008.  HISTORY: ORDERING SYSTEM PROVIDED HISTORY: Lumbar radiculopathy TECHNOLOGIST PROVIDED HISTORY: Reason for Exam: pt stated low back pain radiates into leg down to knees, numbness tingling cassidy feet Additional signs and symptoms: pt stated pain is worse after laying or sitting for a long period Relevant Medical/Surgical History: hx lumbar surg 20 yrs ago FINDINGS: BONES/ALIGNMENT: Lumbar levoscoliosis (apex L2-L3). No significant spondylolisthesis. Vertebral body heights appear preserved. Marrow signal appears within normal limits. No evidence of acute fracture or aggressive appearing osseous lesions. Multilevel intervertebral disc space narrowing. SPINAL CORD: The conus terminates normally. SOFT TISSUES: No paraspinal mass identified. L1-L2: Disc osteophyte complex. Ligamentum flavum thickening. Facet hypertrophy. Mild spinal canal stenosis. Mild bilateral foraminal narrowing. L2-L3: Suspected prior laminectomy changes. Disc osteophyte complex. Facet hypertrophy. No significant spinal canal stenosis. Mild/moderate right and mild left foraminal narrowing. L3-L4: Disc osteophyte complex. Ligamentum flavum thickening. Facet hypertrophy. Moderate spinal canal stenosis. Moderate right and mild left foraminal narrowing. L4-L5: Disc osteophyte complex. Ligamentum thickening. Facet hypertrophy with a left-sided 0.8 cm synovial cyst.  Severe spinal canal stenosis. Mild right and moderate left foraminal narrowing. L5-S1: Disc bulge. Facet hypertrophy. No significant spinal canal stenosis. Moderate left foraminal narrowing. 1. Multilevel lumbar spondylosis, most notable at L4-L5 (left-sided 0.8 cm synovial cyst, severe spinal canal stenosis). 2. Lumbar levoscoliosis (apex L2-L3). IMPRESSION:     1. GI bleeding: History of melena. Hemoglobin dropped to 6.3 overnight. 1 unit of PRBC given. Given the history of NSAID use and color melena I suspect that this is more due to upper GI bleed.   No history of EGDs in the past.    2.  Colonic polyps: Seen on colonoscopy in 2021    3. Bilateral carotid stenosis: Seen on imaging in 2018 both stenoses are less than 70%. Asymptomatic    4. Chronic back pain: Patient intermittently takes ibuprofen and last few years for pain      Old records, labs and imaging reviewed. PLAN   1.  Keep n.p.o. We will plan for EGD  2. Avoid NSAIDs  3. Continue Protonix 40 twice daily  4. Continue checking hemoglobin. Give blood if hemoglobin is less than 7      This plan was formulated in collaboration with Dr. Ricardo Locke  Thank you for allowing us to participate in the care of your patient. Electronically signed by: Ben Weiss MD on 2/6/2023 at 8:15 AM     Please note that this note was generated using a voice recognition dictation software. Although every effort was made to ensure the accuracy of this automated transcription, some errors in transcription may have occurred.

## 2023-02-07 VITALS
HEIGHT: 60 IN | DIASTOLIC BLOOD PRESSURE: 73 MMHG | HEART RATE: 99 BPM | WEIGHT: 166 LBS | BODY MASS INDEX: 32.59 KG/M2 | SYSTOLIC BLOOD PRESSURE: 109 MMHG | OXYGEN SATURATION: 98 % | TEMPERATURE: 97.9 F | RESPIRATION RATE: 15 BRPM

## 2023-02-07 PROBLEM — N18.30 CHRONIC RENAL DISEASE, STAGE III (HCC): Chronic | Status: ACTIVE | Noted: 2023-01-31

## 2023-02-07 PROBLEM — M51.36 LUMBAR DEGENERATIVE DISC DISEASE: Chronic | Status: ACTIVE | Noted: 2023-01-11

## 2023-02-07 PROBLEM — M51.369 LUMBAR DEGENERATIVE DISC DISEASE: Chronic | Status: ACTIVE | Noted: 2023-01-11

## 2023-02-07 LAB
ABSOLUTE EOS #: 0.19 K/UL (ref 0–0.44)
ABSOLUTE IMMATURE GRANULOCYTE: 0.03 K/UL (ref 0–0.3)
ABSOLUTE LYMPH #: 3.1 K/UL (ref 1.1–3.7)
ABSOLUTE MONO #: 0.69 K/UL (ref 0.1–1.2)
ANION GAP SERPL CALCULATED.3IONS-SCNC: 11 MMOL/L (ref 9–17)
BASOPHILS # BLD: 1 % (ref 0–2)
BASOPHILS ABSOLUTE: 0.05 K/UL (ref 0–0.2)
BUN SERPL-MCNC: 13 MG/DL (ref 8–23)
CALCIUM SERPL-MCNC: 8.3 MG/DL (ref 8.6–10.4)
CHLORIDE SERPL-SCNC: 108 MMOL/L (ref 98–107)
CO2 SERPL-SCNC: 18 MMOL/L (ref 20–31)
CREAT SERPL-MCNC: 1 MG/DL (ref 0.5–0.9)
EOSINOPHILS RELATIVE PERCENT: 3 % (ref 1–4)
GFR SERPL CREATININE-BSD FRML MDRD: 58 ML/MIN/1.73M2
GLUCOSE BLD-MCNC: 140 MG/DL (ref 65–105)
GLUCOSE BLD-MCNC: 177 MG/DL (ref 65–105)
GLUCOSE SERPL-MCNC: 129 MG/DL (ref 70–99)
HCT VFR BLD AUTO: 22.9 % (ref 36.3–47.1)
HCT VFR BLD AUTO: 25.8 % (ref 36.3–47.1)
HGB BLD-MCNC: 7.5 G/DL (ref 11.9–15.1)
HGB BLD-MCNC: 8.2 G/DL (ref 11.9–15.1)
IMMATURE GRANULOCYTES: 0 %
LACTIC ACID, WHOLE BLOOD: 0.7 MMOL/L (ref 0.7–2.1)
LYMPHOCYTES # BLD: 41 % (ref 24–43)
MCH RBC QN AUTO: 27.7 PG (ref 25.2–33.5)
MCHC RBC AUTO-ENTMCNC: 32.8 G/DL (ref 28.4–34.8)
MCV RBC AUTO: 84.5 FL (ref 82.6–102.9)
MONOCYTES # BLD: 9 % (ref 3–12)
NRBC AUTOMATED: 0 PER 100 WBC
PDW BLD-RTO: 16.8 % (ref 11.8–14.4)
PLATELET # BLD AUTO: 194 K/UL (ref 138–453)
PMV BLD AUTO: 9.9 FL (ref 8.1–13.5)
POTASSIUM SERPL-SCNC: 3.6 MMOL/L (ref 3.7–5.3)
RBC # BLD: 2.71 M/UL (ref 3.95–5.11)
RBC # BLD: ABNORMAL 10*6/UL
SEG NEUTROPHILS: 46 % (ref 36–65)
SEGMENTED NEUTROPHILS ABSOLUTE COUNT: 3.42 K/UL (ref 1.5–8.1)
SODIUM SERPL-SCNC: 137 MMOL/L (ref 135–144)
SURGICAL PATHOLOGY REPORT: NORMAL
WBC # BLD AUTO: 7.5 K/UL (ref 3.5–11.3)

## 2023-02-07 PROCEDURE — 80048 BASIC METABOLIC PNL TOTAL CA: CPT

## 2023-02-07 PROCEDURE — 99239 HOSP IP/OBS DSCHRG MGMT >30: CPT | Performed by: INTERNAL MEDICINE

## 2023-02-07 PROCEDURE — 6370000000 HC RX 637 (ALT 250 FOR IP)

## 2023-02-07 PROCEDURE — 85025 COMPLETE CBC W/AUTO DIFF WBC: CPT

## 2023-02-07 PROCEDURE — 85018 HEMOGLOBIN: CPT

## 2023-02-07 PROCEDURE — C9113 INJ PANTOPRAZOLE SODIUM, VIA: HCPCS | Performed by: INTERNAL MEDICINE

## 2023-02-07 PROCEDURE — 85014 HEMATOCRIT: CPT

## 2023-02-07 PROCEDURE — 83605 ASSAY OF LACTIC ACID: CPT

## 2023-02-07 PROCEDURE — 6360000002 HC RX W HCPCS: Performed by: INTERNAL MEDICINE

## 2023-02-07 PROCEDURE — 36415 COLL VENOUS BLD VENIPUNCTURE: CPT

## 2023-02-07 PROCEDURE — 6370000000 HC RX 637 (ALT 250 FOR IP): Performed by: INTERNAL MEDICINE

## 2023-02-07 PROCEDURE — 2580000003 HC RX 258: Performed by: INTERNAL MEDICINE

## 2023-02-07 PROCEDURE — 82947 ASSAY GLUCOSE BLOOD QUANT: CPT

## 2023-02-07 RX ORDER — PANTOPRAZOLE SODIUM 40 MG/1
40 TABLET, DELAYED RELEASE ORAL
Qty: 120 TABLET | Refills: 0 | Status: SHIPPED | OUTPATIENT
Start: 2023-02-07 | End: 2023-04-08

## 2023-02-07 RX ORDER — LANOLIN ALCOHOL/MO/W.PET/CERES
325 CREAM (GRAM) TOPICAL
Status: DISCONTINUED | OUTPATIENT
Start: 2023-02-07 | End: 2023-02-07 | Stop reason: HOSPADM

## 2023-02-07 RX ORDER — LANOLIN ALCOHOL/MO/W.PET/CERES
325 CREAM (GRAM) TOPICAL
Qty: 90 TABLET | Refills: 3 | Status: SHIPPED | OUTPATIENT
Start: 2023-02-08

## 2023-02-07 RX ORDER — POTASSIUM CHLORIDE 20 MEQ/1
40 TABLET, EXTENDED RELEASE ORAL ONCE
Status: COMPLETED | OUTPATIENT
Start: 2023-02-07 | End: 2023-02-07

## 2023-02-07 RX ADMIN — SODIUM CHLORIDE, PRESERVATIVE FREE 10 ML: 5 INJECTION INTRAVENOUS at 08:11

## 2023-02-07 RX ADMIN — DESMOPRESSIN ACETATE 40 MG: 0.2 TABLET ORAL at 08:10

## 2023-02-07 RX ADMIN — ONDANSETRON 4 MG: 2 INJECTION INTRAMUSCULAR; INTRAVENOUS at 14:35

## 2023-02-07 RX ADMIN — POTASSIUM CHLORIDE 40 MEQ: 1500 TABLET, EXTENDED RELEASE ORAL at 09:12

## 2023-02-07 RX ADMIN — FERROUS SULFATE TAB EC 325 MG (65 MG FE EQUIVALENT) 325 MG: 325 (65 FE) TABLET DELAYED RESPONSE at 09:12

## 2023-02-07 RX ADMIN — SODIUM CHLORIDE, PRESERVATIVE FREE 40 MG: 5 INJECTION INTRAVENOUS at 04:45

## 2023-02-07 RX ADMIN — ONDANSETRON 4 MG: 2 INJECTION INTRAMUSCULAR; INTRAVENOUS at 08:10

## 2023-02-07 ASSESSMENT — PAIN DESCRIPTION - DESCRIPTORS
DESCRIPTORS: ACHING;DULL
DESCRIPTORS: ACHING;DULL

## 2023-02-07 ASSESSMENT — PAIN SCALES - GENERAL
PAINLEVEL_OUTOF10: 5
PAINLEVEL_OUTOF10: 4

## 2023-02-07 ASSESSMENT — PAIN DESCRIPTION - PAIN TYPE
TYPE: CHRONIC PAIN
TYPE: CHRONIC PAIN

## 2023-02-07 ASSESSMENT — PAIN DESCRIPTION - LOCATION
LOCATION: BACK
LOCATION: BACK

## 2023-02-07 ASSESSMENT — PAIN DESCRIPTION - ORIENTATION
ORIENTATION: LOWER
ORIENTATION: LOWER

## 2023-02-07 ASSESSMENT — PAIN DESCRIPTION - ONSET
ONSET: ON-GOING
ONSET: ON-GOING

## 2023-02-07 ASSESSMENT — PAIN DESCRIPTION - FREQUENCY
FREQUENCY: CONTINUOUS
FREQUENCY: CONTINUOUS

## 2023-02-07 ASSESSMENT — PAIN - FUNCTIONAL ASSESSMENT
PAIN_FUNCTIONAL_ASSESSMENT: ACTIVITIES ARE NOT PREVENTED
PAIN_FUNCTIONAL_ASSESSMENT: ACTIVITIES ARE NOT PREVENTED

## 2023-02-07 NOTE — PROGRESS NOTES
[facility]  Internal Medicine Teaching Residency Program  Inpatient Daily Progress Note  ______________________________________________________________________________    Patient: Joshua Abel  YOB: 1945   [facility]:[id number]    Acct: [de-identified]     Room: 2023/2023-01  Admit date: 2/5/2023  Today's date: 02/07/23  Number of days in the hospital: 2    SUBJECTIVE   Admitting Diagnosis: Gastrointestinal hemorrhage  CC: Black tarry stools, generalized weakness. Pt examined at bedside. Chart & results reviewed. No acute overnight events. Patient is hemodynamically stable, afebrile, breathing on room. Patient is holding hemoglobin. Hemoglobin checked in the morning is 8.2. Patient had EGD yesterday. Tolerated the procedure well. ROS:  Constitutional:  negative for chills, fevers, sweats  Respiratory:  negative for cough, dyspnea on exertion, hemoptysis, shortness of breath, wheezing  Cardiovascular:  negative for chest pain, chest pressure/discomfort, lower extremity edema, palpitations  Gastrointestinal:  negative for abdominal pain, constipation, diarrhea, nausea, vomiting  Neurological:  negative for dizziness, headache  BRIEF HISTORY        The patient is a pleasant 68 y.o. female with PMH of T2DM, HTN, HFpEF chronic back pain presented to ED with complaints of black tarry stools. Per patient, she was completely altered at baseline mental 2nd February when she developed black tarry stools on fourth and fifth of February. Patient had total of 4 episodes in these 2 days where the stools were formed but extremely black. No bright red blood per rectum noted. No hematemesis. Patient denies taking any anticoagulant or iron supplements. Patient quit smoking 25 years ago, does not drink does not use any recreational drug.   Per patient, black starry stools started on its own and afterwards, patient was feeding bloated with generalized abdominal ache with loss of appetite. Patient did vomit on Wednesday. No nausea, chest pain, shortness of breath. Patient has been feeling generalized weakness. In ED, work-up showed none anion gap metabolic acidosis with bicarb of 17, ROSELIA with creatinine of 1.09, hemoglobin dropped to 7.7 (9 in December). No signs of active bleeding noted. CT abdomen and pelvis was done which was unremarkable. Internal medicine and GI was consulted from ER for evaluation of occult GI bleed. OBJECTIVE     Vital Signs:  /73   Pulse 99   Temp 97.9 °F (36.6 °C) (Oral)   Resp 15   Ht 5' (1.524 m)   Wt 166 lb (75.3 kg)   SpO2 98%   BMI 32.42 kg/m²     Temp (24hrs), Av.1 °F (36.7 °C), Min:97.6 °F (36.4 °C), Max:99.1 °F (37.3 °C)    In: 430   Out: -     Physical Exam:  Constitutional: This is a well developed, well nourished,  68y.o. year old female who is alert, oriented, cooperative and in no apparent distress. Head:normocephalic and atraumatic. EENT:  PERRLA. Conjunctiva pale. Septum was midline, mucosa was without erythema, exudates or cobblestoning. No thrush was noted. Neck: Supple without thyromegaly. No elevated JVP. Trachea was midline.= Bilateral carotid bruits louder on the left as compared to the right  Respiratory: Chest was symmetrical without dullness to percussion. Breath sounds bilaterally were clear to auscultation. There were no wheezes, rhonchi or rales. There is no intercostal retraction or use of accessory muscles. No egophony noted. Cardiovascular: Regular without murmur, clicks, gallops or rubs. Abdomen: Unremarkable examination. ANGEL negative for fresh blood. Lymphatic: No lymphadenopathy. Musculoskeletal: Normal curvature of the spine. No gross muscle weakness. Extremities:  No lower extremity edema, ulcerations, tenderness, varicosities or erythema. Muscle size, tone and strength are normal.  No involuntary movements are noted. Skin:  Warm and dry.   Good color, turgor and pigmentation. No lesions or scars. No cyanosis or clubbing  Neurological/Psychiatric: The patient's general behavior, level of consciousness, thought content and emotional status is normal.        Medications:  Scheduled Medications:    ferrous sulfate  325 mg Oral Daily with breakfast    pantoprazole (PROTONIX) 40 mg injection  40 mg IntraVENous Q12H    atorvastatin  40 mg Oral Daily    sodium chloride flush  5-40 mL IntraVENous 2 times per day    insulin lispro  0-4 Units SubCUTAneous TID WC    insulin lispro  0-4 Units SubCUTAneous Nightly     Continuous Infusions:    dextrose       PRN MedicationstraMADol, 50 mg, Q6H PRN  sodium chloride flush, 5-40 mL, PRN  ondansetron, 4 mg, Q8H PRN   Or  ondansetron, 4 mg, Q6H PRN  glucose, 4 tablet, PRN  dextrose bolus, 125 mL, PRN   Or  dextrose bolus, 250 mL, PRN  glucagon (rDNA), 1 mg, PRN  dextrose, , Continuous PRN      Diagnostic Labs:  CBC:   Recent Labs     02/05/23 1215 02/05/23 1956 02/06/23 0433 02/06/23  0857 02/06/23  1941 02/07/23  0230 02/07/23  0814   WBC 10.2  --  8.5  --   --  7.5  --    RBC 2.78*  --  2.82*  --   --  2.71*  --    HGB 7.7*   < > 7.7*   < > 7.6* 7.5* 8.2*   HCT 24.8*   < > 25.8*   < > 24.3* 22.9* 25.8*   MCV 89.2  --  91.5  --   --  84.5  --    RDW 17.3*  --  16.0*  --   --  16.8*  --      --  196  --   --  194  --     < > = values in this interval not displayed. BMP:   Recent Labs     02/05/23 1215 02/06/23 0433 02/07/23  0230    137 137   K 3.6* 4.0 3.6*    109* 108*   CO2 17* 16* 18*   BUN 33* 22 13   CREATININE 1.09* 0.96* 1.00*     BNP: No results for input(s): BNP in the last 72 hours. PT/INR: No results for input(s): PROTIME, INR in the last 72 hours. APTT: No results for input(s): APTT in the last 72 hours. CARDIAC ENZYMES: No results for input(s): CKMB, CKMBINDEX, TROPONINI in the last 72 hours.     Invalid input(s): CKTOTAL;3  FASTING LIPID PANEL:  Lab Results   Component Value Date    CHOL 140 05/04/2020    HDL 53 12/03/2022    TRIG 100 05/04/2020     LIVER PROFILE:   Recent Labs     02/05/23  1215   AST 32*   ALT 13   BILITOT 0.4   ALKPHOS 55      MICROBIOLOGY: No results found for: CULTURE    Imaging:    CT ABDOMEN PELVIS WO CONTRAST Additional Contrast? None    Result Date: 2/5/2023  No acute abdominal or pelvic abnormality. ASSESSMENT & PLAN     IMPRESSION  This is a 68 y.o. female who presented with black tarry stools and generalized abdominal pain and found to have acute blood loss anemia with hemoglobin of 7.7. Upper GI bleed  Status post 1 unit PRBC. Patient had EGD yesterday which showed multiple small ulcers in first and second part of the duodenum likely secondary to NSAID use. No active stigmata of bleeding noted. Patient is educated on avoiding NSAIDs and using Protonix twice daily. Written instructions will be provided at discharge. Patient hemoglobin is 8.2 on discharge. Type 2 diabetes mellitus-controlled  Last A1c 6.5 in December 2022. Patient takes metformin and glimepiride at home. This patient has not eating and drinking actively, will hold on home medications and start sliding scale. Will adjust insulin as needed. Hypertension-controlled  Patient takes valsartan hydrochlorothiazide at home. Will hold for now as blood pressure is borderline. Bilateral carotid stenosis  She follows with vascular for Carotid artery stenosis and is suppose to obtain CTA but is waiting to see nephrology. She has had a steady incline in Cr over years as far back as 2011 strongly suggestive for CKD. Currently creatinine 1.09. HFpEF-at baseline-compensated  Echo done August 2021 shows EF of 54 with G1 DD. Right atrium mildly dilated, trivial MR. Patient takes valsartan HCTZ, aspirin at home. Will hold for now, monitor       DVT ppx: EPC cuff  GI ppx: Protonix  Diet: Clear liquid  PT/OT/SW: Consulted  Discharge Planning: Discharge today.        Firestone Medico, MD  Internal Medicine Resident, PGY-1  [facility]; [place], [place]  2/7/2023, 1:41 PM  Attending Physician Statement    I have discussed the case of Cris Collado, including pertinent history and exam findings with the resident. I have seen and examined the patient and the key elements of the encounter have been performed by me. I agree with the assessment, plan, and orders as documented by the resident.   Hemoglobin stable and she can be discharged today on PPIs  Electronically signed by Brenda Nelson MD on 2/7/2023 at 3:31 PM

## 2023-02-07 NOTE — DISCHARGE INSTR - COC
Continuity of Care Form    Patient Name: Marycarmen Martin   :  1945  MRN:  [id number]    Admit date:  2023  Discharge date:  ***    Code Status Order: Full Code   Advance Directives:     Admitting Physician:  Sarah Mccabe MD  PCP: Trino Mauricio MD    Discharging Nurse: [facility] Unit/Room#:   Discharging Unit Phone Number: ***    Emergency Contact:   Extended Emergency Contact Information  Primary Emergency Contact: Toño Goodwin, [address] Phone: [phone]  Work Phone: [phone]  Mobile Phone: [phone]  Relation: Child  Hearing or visual needs: None  Other needs: None  Preferred language: English   needed? No  Secondary Emergency Contact: TYNTYNDER SOUTH, [address] Phone: [phone]  Work Phone: [phone]  Mobile Phone: [phone]  Relation: Child  Hearing or visual needs: None  Other needs: None  Preferred language: English   needed?  No    Past Surgical History:  Past Surgical History:   Procedure Laterality Date    BACK SURGERY      CARPAL TUNNEL RELEASE Bilateral     10 yrs ago    CATARACT REMOVAL WITH IMPLANT Bilateral     COLONOSCOPY  2021    COLONOSCOPY POLYPECTOMY HOT BIOPSY performed by Louann Figueroa MD at \[facility]\"" Endoscopy    COLONOSCOPY  2021    COLONOSCOPY W/ ENDOSCOPIC MUCOSAL RESECTION performed by Louann Figueroa MD at \[facility]\"" Endoscopy    COLONOSCOPY  2021    COLONOSCOPY WITH BIOPSY performed by Louann Figueroa MD at [address] [place] [address]      ESOPHAGOGASTRODUODENOSCOPY  2023    EYE SURGERY      JOINT REPLACEMENT      LUMBAR DISCECTOMY      TONSILLECTOMY      TOTAL KNEE ARTHROPLASTY Right     TOTAL KNEE ARTHROPLASTY      TOTAL KNEE ARTHROPLASTY Left 2014       Immunization History:   Immunization History   Administered Date(s) Administered    COVID-19, PFIZER PURPLE top, DILUTE for use, (age 15 y+), 30mcg/0.3mL 01/27/2021, 02/17/2021    Influenza, AFLURIA (age 1 yrs+), FLUZONE, (age 10 mo+), MDV, 0.5mL 09/20/2017, 09/26/2018, 11/07/2019    Influenza, FLUARIX, FLULAVAL, FLUZONE (age 10 mo+) AND AFLURIA, (age 1 y+), PF, 0.5mL 12/13/2022    Influenza, Triv, 3 Years and older, IM (Afluria (5 yrs and older) 09/14/2016    Pneumococcal Conjugate 13-valent (Rhijone00) 10/26/2016    Pneumococcal Polysaccharide (Xfrujuhee50) 10/08/2008, 03/27/2017    Tetanus Toxoid, absorbed 11/11/2004       Active Problems:  Patient Active Problem List   Diagnosis Code    S/P total knee arthroplasty Z96.659    Type 2 diabetes mellitus with diabetic nephropathy, without long-term current use of insulin ([facility]) E11.21    Hypertension I10    Asthma J45.909    Arthritis M19.90    Acquired trigger finger M65.30    Osteoarthritis of knee M17.9    Morbid obesity with BMI of 40.0-44.9, adult ([facility] [address] E66.01, Z68.41    Current mild episode of major depressive disorder without prior episode ([facility]) F32.0    Bilateral carotid artery stenosis I65.23    Other specified glaucoma H40.89    Chronic systolic (congestive) heart failure I50.22    Lumbar radiculopathy M54.16    Lumbar degenerative disc disease M51.36    Lumbar spondylosis M47.816    Chronic renal disease, stage III [facility]) [359934] N18.30    Gastrointestinal hemorrhage K92.2       Isolation/Infection:   Isolation            No Isolation          Patient Infection Status       None to display            Nurse Assessment:  Last Vital Signs: /73   Pulse 99   Temp 97.9 °F (36.6 °C) (Oral)   Resp 15   Ht 5' (1.524 m)   Wt 166 lb (75.3 kg)   SpO2 98%   BMI 32.42 kg/m²     Last documented pain score (0-10 scale): Pain Level: 4  Last Weight:   Wt Readings from Last 1 Encounters:   02/05/23 166 lb (75.3 kg)     Mental Status:  {IP PT MENTAL STATUS:20030}    IV Access:  {[facility] [place] ACCESS:718866189}    Nursing Mobility/ADLs:  Walking   {[facility] DME TDDO:808617012}  Transfer  {CHP DME XPFC:889260610}  Bathing  {CHP DME OEIX:536084079}  Dressing  {CHP DME ZFXU:611212009}  Toileting  {[facility]P DME KITJ:927976216}  Feeding  {CHP DME CEGM:360412989}  Med Admin  {[facility]P DME EKZQ:410860463}  Med Delivery   {[facility] MED Delivery:162400936}    Wound Care Documentation and Therapy:  Incision 14 Knee Left (Active)   Number of days: 3302        Elimination:  Continence: Bowel: {YES / XL:76106}  Bladder: {YES / FU:89182}  Urinary Catheter: {Urinary Catheter:135051928}   Colostomy/Ileostomy/Ileal Conduit: {YES / FQ:74471}       Date of Last BM: ***    Intake/Output Summary (Last 24 hours) at 2023 1315  Last data filed at 2023  Gross per 24 hour   Intake 230 ml   Output --   Net 230 ml     I/O last 3 completed shifts: In: 4985 [P.O.:230;  I.V.:1960; Blood:340; IV Piggyback:50]  Out: 200 [Urine:200]    Safety Concerns:     [address] Safety Concerns:248602438}    Impairments/Disabilities:      [address] Impairments/Disabilities:335170032}    Nutrition Therapy:  Current Nutrition Therapy:   [address] Diet List:315985327}    Routes of Feeding: {[facility] DME Other Feedings:950646065}  Liquids: {Slp liquid thickness:86840}  Daily Fluid Restriction: {CHP DME Yes amt example:834489128}  Last Modified Barium Swallow with Video (Video Swallowing Test): {Done Not Done NZXA:721459181}    Treatments at the Time of Hospital Discharge:   Respiratory Treatments: ***  Oxygen Therapy:  {Therapy; copd oxygen:19966}  Ventilator:    { CC Vent ERHS:391623429}    Rehab Therapies: {THERAPEUTIC INTERVENTION:3065542290}  Weight Bearing Status/Restrictions: [facility] Weight Bearin}  Other Medical Equipment (for information only, NOT a DME order):  {EQUIPMENT:964048105}  Other Treatments: ***    Patient's personal belongings (please select all that are sent with patient):  {LAURA DME Belongings:310733963}    RN SIGNATURE:  {Esignature:447799714}    CASE MANAGEMENT/SOCIAL WORK SECTION    Inpatient Status Date: ***    Readmission Risk Assessment Score:  Readmission Risk              Risk of Unplanned Readmission:  12           Discharging to Facility/ Agency   Name:   Address:  Phone:  Fax:    Dialysis Facility (if applicable)   Name:  Address:  Dialysis Schedule:  Phone:  Fax:    / signature: {Esignature:036120008}    PHYSICIAN SECTION    Prognosis: {Prognosis:6686110547}    Condition at Discharge: [facility] Patient Condition:850640086}    Rehab Potential (if transferring to Rehab): {Prognosis:4552127958}    Recommended Labs or Other Treatments After Discharge: ***    Physician Certification: I certify the above information and transfer of Denise [facility] Mix  is necessary for the continuing treatment of the diagnosis listed and that she requires {Admit to Appropriate Level of Care:02848} for {GREATER/LESS:276550000} 30 days.      Update Admission H&P: {CHP DME Changes in ORNGN:703534954}    PHYSICIAN SIGNATURE:  {Esignature:833491952}

## 2023-02-07 NOTE — PROGRESS NOTES
Comprehensive Nutrition Assessment    Type and Reason for Visit:  Initial, Positive Nutrition Screen (Weight Loss; Poor Intakes/Appetite)    Nutrition Recommendations/Plan:   Continue current diet. Encourage intakes as tolerated. Monitor plan of care. Malnutrition Assessment:  Malnutrition Status:  No malnutrition (02/07/23 1147)    Context:  Acute Illness     Findings of the 6 clinical characteristics of malnutrition:  Energy Intake:  Mild decrease in energy intake - Improved since admission. Weight Loss:  No significant weight loss     Body Fat Loss:  No significant body fat loss   Muscle Mass Loss:  No significant muscle mass loss  Fluid Accumulation:  No significant fluid accumulation   Strength:  Not Performed    Nutrition Assessment:    Pt admitted with c/o abdominal pain, nausea, and dark, tarry stools. PMH: CKD, HTN. Pt s/p EGD yesterday. Pt states her appetite and PO intakes were decreased before admission. Reports yesterday her appetite was improved and she ate mashed potatoes for lunch. This morning pt reports she ate most of her breakfast including Khmer toast, scrambled eggs, potatoes, yogurt, banana, and milk. Pt states she usually eats more for breakfast as it is her favorite meal.  States  lb x past 6 months. Reports previously weighing 204 lb and reports weight loss over past 2 years. Noted stable weight history per chart review. Labs reviewed:K 3.6 mmol/L. Meds reviewed. Nutrition Related Findings:    Labs/Meds reviewed. Wound Type: None       Current Nutrition Intake & Therapies:    Average Meal Intake: %  Average Supplements Intake: None Ordered  ADULT DIET; Regular; No Added Salt (3-4 gm)    Anthropometric Measures:  Height: 5' (152.4 cm)  Ideal Body Weight (IBW): 100 lbs (45 kg)    Admission Body Weight: 166 lb 12.8 oz (75.7 kg)  Current Body Weight: 166 lb (75.3 kg), 166 % IBW.  Weight Source: Stated  Current BMI (kg/m2): 32.4  Usual Body Weight: 175 lb 3.2 oz (79.5 kg) (6/21/22 bed scale per chart review)  % Weight Change (Calculated): -5.3                    BMI Categories: Obese Class 1 (BMI 30.0-34. 9)    Estimated Daily Nutrient Needs:  Energy Requirements Based On: Formula  Weight Used for Energy Requirements: Admission  Energy (kcal/day): 5788-1353 kcals/day  Weight Used for Protein Requirements: Ideal  Protein (g/day): 70 gm pro/day  Method Used for Fluid Requirements: ml/Kg  Fluid (ml/day): 25 mL/kg = 2636-7880 mL/day or per MD    Nutrition Diagnosis: Improved since admission.   Inadequate oral intake related to  (abdominal pain; decreased appetite) as evidenced by poor intake prior to admission    Nutrition Interventions:   Food and/or Nutrient Delivery: Continue Current Diet  Nutrition Education/Counseling: No recommendation at this time  Coordination of Nutrition Care: Continue to monitor while inpatient  Plan of Care discussed with: Patient    Nutrition Monitoring and Evaluation:   Behavioral-Environmental Outcomes: None Identified  Food/Nutrient Intake Outcomes: Food and Nutrient Intake  Physical Signs/Symptoms Outcomes: Biochemical Data, GI Status, Weight, Skin    Discharge Planning:    No discharge needs at this time     July Law, [address] [place] [address],   Contact: [phone]

## 2023-02-07 NOTE — PLAN OF CARE
Problem: Discharge Planning  Goal: Discharge to home or other facility with appropriate resources  2/6/2023 2340 by Thompson Sandifer, RN  Outcome: Progressing  2/6/2023 1608 by Judith Abbasi RN  Outcome: Progressing     Problem: Pain  Goal: Verbalizes/displays adequate comfort level or baseline comfort level  2/6/2023 2340 by Thompson Sandifer, RN  Outcome: Progressing  2/6/2023 1608 by Judith Abbasi RN  Outcome: Progressing     Problem: Safety - Adult  Goal: Free from fall injury  2/6/2023 2340 by Thompson Sandifer, RN  Outcome: Progressing  2/6/2023 1608 by Judith Abbasi RN  Outcome: Progressing

## 2023-02-07 NOTE — DISCHARGE INSTRUCTIONS
You were admitted because of black starry stools that you have been having for 2 days with 4 episodes. You had a colonoscopy in 2021 which showed multiple polyps status postresection/body ectomy along with internal hemorrhoids. 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 GI was consulted inpatient and an EGD was performed which showed multiple clean-based duodenal ulcers. As per GI, recommendations are to - use acid suppression with a proton pump inhibitor Protonix BID for 8 weeks,   -Await pathology.  -Avoid NSAID's. - You were counseled to get colonoscopy as an outpatient for f/u of large polyp in cecum resected in 2021. You stated that you don't like to take prep but will f/u with your gastroenterologist.       Continue taking your other medications regularly. Please visit the ER if symptoms worsen.

## 2023-02-07 NOTE — TELEPHONE ENCOUNTER
Called patient to check in. She was admitted to hospital with GI bleed and I have been following charting.

## 2023-02-07 NOTE — ANESTHESIA POSTPROCEDURE EVALUATION
Department of Anesthesiology  Postprocedure Note    Patient: Carie Horne  MRN: [id number]  YOB: 1945  Date of evaluation: 2/7/2023      Procedure Summary     Date: 02/06/23 Room / Location: [address]    Anesthesia Start: 6214 Anesthesia Stop: 4355    Procedure: EGD BIOPSY Diagnosis:       Melena      Other iron deficiency anemia      (MELENA, ANEMIA)    Surgeons: Sampson Casiano MD Responsible Provider: Lui Vieyra MD    Anesthesia Type: MAC ASA Status: 3          Anesthesia Type: No value filed.     Katina Phase I: Katina Score: 9    Katina Phase II: Katina Score: 9    POST-OP ANESTHESIA NOTE       /73   Pulse 99   Temp 97.9 °F (36.6 °C) (Oral)   Resp 15   Ht 5' (1.524 m)   Wt 166 lb (75.3 kg)   SpO2 98%   BMI 32.42 kg/m²    Pain Assessment: None - Denies Pain  Pain Level: 4        Anesthesia Post Evaluation    Patient location during evaluation: PACU  Patient participation: complete - patient participated  Level of consciousness: awake  Pain score: 4  Airway patency: patent  Nausea & Vomiting: no nausea and no vomiting  Complications: no  Cardiovascular status: hemodynamically stable  Respiratory status: acceptable  Hydration status: stable

## 2023-02-08 ENCOUNTER — CARE COORDINATION (OUTPATIENT)
Dept: CASE MANAGEMENT | Age: 78
End: 2023-02-08

## 2023-02-08 DIAGNOSIS — K26.4 GASTROINTESTINAL HEMORRHAGE ASSOCIATED WITH DUODENAL ULCER: Primary | ICD-10-CM

## 2023-02-08 PROCEDURE — 1111F DSCHRG MED/CURRENT MED MERGE: CPT

## 2023-02-08 NOTE — CARE COORDINATION
[facility] Care Transitions Initial Follow Up Call    Call within 2 business days of discharge: Yes    Patient Current Location:  Home: [place] [address] Transition Nurse contacted the patient by telephone to perform post hospital discharge assessment. Verified name and  with patient as identifiers. Provided introduction to self, and explanation of the Care Transition Nurse role. Patient: Danae Collado Patient : 1945   MRN: [id number]  Reason for Admission:   Discharge Date: 23 RARS: Readmission Risk Score: 14.6      Last Discharge [address]       Date Complaint Diagnosis Description Type Department Provider    23 Abdominal Pain; Nausea Gastrointestinal hemorrhage, unspecified gastrointestinal hemorrhage type . .. ED to Hosp-Admission (Discharged) (ADMITTED) [facility] 2 Queenie Edwards MD; Sharon Herrera... Was this an external facility discharge? No Discharge Facility: [facility]    Challenges to be reviewed by the provider   Additional needs identified to be addressed with provider: No  none               Method of communication with provider: none. Writer spoke to patient, she is doing ok, feeling pretty tired today, has just been resting today, reviewed discharge instructions and medications, 1111 f order completed, reviewed instructions from GI  You were admitted because of black starry stools that you have been having for 2 days with 4 episodes. You had a colonoscopy in  which showed multiple polyps status postresection/body ectomy along with internal  hemorrhoids. 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 GI was consulted inpatient and an EGD was performed which showed multiple clean-based duodenal ulcers. As per GI, recommendations are to - use acid suppression with a proton pump inhibitor Protonix BID for 8 weeks,  -Await pathology.  -Avoid NSAID's.   - You were counseled to get colonoscopy as an outpatient for f/u of large polyp in cecum resected in 2021. You stated that  you don't like to take prep but will f/u with your gastroenterologist.  Continue taking your other medications regularly. Please visit the ER if symptoms worsen. Patient denies, sob, f/c, chest pain, n/v/d, abdominal pain, bleeding  Writer offered to assist patient in making [place] appointments, patient declined offer stated she will make her own appointments, explained role of CTN, provided contact information, will follow  Care Transition Nurse reviewed discharge instructions, medical action plan, and red flags with patient who verbalized understanding. The patient was given an opportunity to ask questions and does not have any further questions or concerns at this time. Were discharge instructions available to patient? Yes. Reviewed appropriate site of care based on symptoms and resources available to patient including: PCP  Specialist  Urgent care clinics  When to call 911. The patient agrees to contact the PCP office for questions related to their healthcare. Advance Care Planning:   Does patient have an Advance Directive: reviewed and current. Medication reconciliation was performed with patient, who verbalizes understanding of administration of home medications. Medications reviewed, 1111F entered: yes    Was patient discharged with a pulse oximeter? Non-face-to-face services provided:  Obtained and reviewed discharge summary and/or continuity of care documents  Education of patient/family/caregiver/guardian to support self-management-   Assessment and support for treatment adherence and medication management-     Offered patient enrollment in the Remote Patient Monitoring (RPM) program for in-home monitoring:  will address next outreach .     Care Transitions 24 Hour Call    Schedule Follow Up Appointment with PCP: Declined  Do you have a copy of your discharge instructions?: Yes  Do you have all of your prescriptions and are they filled?: Yes  Have you been contacted by a [address]?: No  Have you scheduled your follow up appointment?: No (Comment: will call herself to schedule HFU)  Do you feel like you have everything you need to keep you well at home?: Yes  Care Transitions Interventions  Disease Association: Completed  Specialty Service Referral: Declined             Discussed follow-up appointments. If no appointment was previously scheduled, appointment scheduling offered: Yes. Is follow up appointment scheduled within 7 days of discharge? Patient stated will make own f/u appointments, refused writer's offer to assist//JU. Follow Up  Future Appointments   Date Time Provider Eulalia Zuleta   3/7/2023  3:00 PM Trino Mauricio MD [facility] IM Haley Rank   4/28/2023  9:10 AM Spencer Gonzalez MD [facility] None   7/18/2023 10:15 AM Cecelia Padilla MD Resp Spec [facility] 110 Transition Nurse provided contact information. Plan for follow-up call in 3-5 days based on severity of symptoms and risk factors.   Plan for next call: symptom management-   follow-up appointment-   medication management-     Naldo Bradford RN

## 2023-02-08 NOTE — PROGRESS NOTES
Pt did not have $ for copay- 2 rx transferred to [facility] on [address]

## 2023-02-09 NOTE — DISCHARGE SUMMARY
[address]     Department of Internal Medicine - Staff Internal Medicine Teaching Service    INPATIENT DISCHARGE SUMMARY      Patient Identification:  Arpan Edwards is a 68 y.o. female. :  1945  MRN: [id number]     Acct: [de-identified]   PCP: Royal Mooney MD  Admit Date:  2023  Discharge date and time: 2023  4:40 PM   Attending Provider: No att. providers found                                     [address] Problem Lists:  Principal Problem:    Gastrointestinal hemorrhage  Active Problems:    Lumbar degenerative disc disease    Chronic renal disease, stage III ([facility] [address] [864837]    Type 2 diabetes mellitus with diabetic nephropathy, without long-term current use of insulin ([facility] [address]  Resolved Problems:    * No resolved hospital problems. *      HOSPITAL STAY     Brief Inpatient course:   Arpan Edwards is a 68 y.o. female who was admitted for the management of Gastrointestinal hemorrhage, presented to the emergency department with dark tarry stool with drop in hemoglobin to 7.3. During admission patient did not have any signs of active bleed however, did require 1 unit of PRBC as hemoglobin dropped <7. Patient had EGD the next day. Findings    Esophagus: Normal.   Stomach: Mild gastric erythema in antrum. This was biopsied with cold biopsy forceps. Stomach otherwise normal.   Duodenum: Multiple clean based duodenal ulcers between 1 and 2 nd portion of duodenum with no high risk stigmata likely the source of recent bleeding. Duodenum otherwise normal.      Recommendations: -Acid suppression with a proton pump inhibitor BID for 8 weeks,   -Await pathology.  -Avoid NSAID's.  - Patient was counseled to get colonoscopy as an outpatient for f/u of large polyp in cecum resected in . She states that she doesn't like to take prep but will f/u with her gastroenterologist.      After EGD, patient was observed overnight.   Hemoglobin remained stable and was 8.2 on discharge. Patient was given all the instructions on discharge. Voiced understanding.     Procedures/ Significant Interventions:    EGD     Consults:     Consults:     Final Specialist Recommendations/Findings:   IP CONSULT TO INTERNAL MEDICINE  IP CONSULT TO GI  IP CONSULT TO CASE MANAGEMENT      Any Hospital Acquired Infections: none    Discharge Functional Status:  stable    DISCHARGE PLAN     Disposition: home    Patient Instructions:   Discharge Medication List as of 2/7/2023  2:27 PM        START taking these medications    Details   ferrous sulfate (FE TABS 325) 325 (65 Fe) MG EC tablet Take 1 tablet by mouth daily (with breakfast), Disp-90 tablet, R-3Normal      pantoprazole (PROTONIX) 40 MG tablet Take 1 tablet by mouth 2 times daily (before meals), Disp-120 tablet, R-0Normal           CONTINUE these medications which have NOT CHANGED    Details   metFORMIN (GLUCOPHAGE) 1000 MG tablet TAKE ONE TABLET BY MOUTH TWICE A DAY WITH MEALS, Disp-60 tablet, R-3Normal      fluticasone (FLOVENT HFA) 110 MCG/ACT inhaler Inhale 2 puffs into the lungs 2 times daily, Disp-1 each, R-11Normal      fluticasone (FLONASE) 50 MCG/ACT nasal spray 2 sprays by Nasal route daily, Disp-1 each, R-11Normal      valsartan-hydroCHLOROthiazide (DIOVAN-HCT) 160-12.5 MG per tablet TAKE ONE TABLET BY MOUTH DAILY, Disp-90 tablet, R-0Normal      simvastatin (ZOCOR) 40 MG tablet TAKE ONE TABLET BY MOUTH ONCE NIGHTLY, Disp-90 tablet, R-2Normal      glimepiride (AMARYL) 4 MG tablet Take 1 tablet by mouth every morning (before breakfast), Disp-90 tablet, R-2Normal      citalopram (CELEXA) 40 MG tablet TAKE ONE TABLET BY MOUTH DAILY, Disp-90 tablet, R-3Normal      Latanoprost 0.005 % EMUL latanoprost 0.005 % eye dropsHistorical Med      albuterol sulfate  (90 Base) MCG/ACT inhaler Inhale 2 puffs into the lungs every 6 hours as needed for Wheezing, Disp-1 Inhaler, R-11Normal      acetaminophen (TYLENOL) 500 MG tablet Take 500 mg by mouthHistorical Med      aspirin 81 MG tablet Take 81 mg by mouth daily           STOP taking these medications       traMADol (ULTRAM) 50 MG tablet Comments:   Reason for Stopping:               Activity: activity as tolerated    Diet: regular diet    Follow-up:    MD Jarod Holt [place]  [phone]    Call in 10 day(s)      Amarilis Solis MD  [address] [facility] [address] [facility]                                                                             [address]  [phone]    Call in 1 week(s)        Patient Instructions: You were admitted because of black starry stools that you have been having for 2 days with 4 episodes. You had a colonoscopy in 2021 which showed multiple polyps status postresection/body ectomy along with internal hemorrhoids. 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 GI was consulted inpatient and an EGD was performed which showed multiple clean-based duodenal ulcers. As per GI, recommendations are to - use acid suppression with a proton pump inhibitor Protonix BID for 8 weeks,   -Await pathology.  -Avoid NSAID's. - You were counseled to get colonoscopy as an outpatient for f/u of large polyp in cecum resected in 2021. You stated that you don't like to take prep but will f/u with your gastroenterologist.       Continue taking your other medications regularly. Please visit the ER if symptoms worsen. Lorena Wise MD, MD  Internal Medicine Resident, PGY-1  [facility];  [place], [place]  2/9/2023, 11:39 AM

## 2023-02-15 ENCOUNTER — CARE COORDINATION (OUTPATIENT)
Dept: CASE MANAGEMENT | Age: 78
End: 2023-02-15

## 2023-02-15 NOTE — CARE COORDINATION
[address] Care Transitions Follow Up Call    Patient Current Location:  Home: [address] [facility]    Care Transition Nurse contacted the patient by telephone to follow up after admission on 2/15/23. Verified name and  with patient as identifiers. Patient: Spencer Collado  Patient : 1945   MRN: [id number]  Reason for Admission:   Discharge Date: 23 RARS: Readmission Risk Score: 14.6      Needs to be reviewed by the provider   Additional needs identified to be addressed with provider: No  none             Method of communication with provider: none. Patient doing ok, still has some back and leg pain but she had that before her hospital stay, no bloody or tarry stools, denies, f/c, n/v/d, sob, cp or dizziness, appetite is good, has f/u with PCP on 23, will continue to follow//JU    Addressed changes since last contact:  none  Discussed follow-up appointments. If no appointment was previously scheduled, appointment scheduling offered: Yes. Is follow up appointment scheduled within 7 days of discharge? Yes. Follow Up  Future Appointments   Date Time Provider Eulalia Zuleta   2023 10:00 AM Brandon Villalpando MD [facility] IM [facility]   3/14/2023 11:30 AM [facility]   3/29/2023 10:45 AM DO TEODORO VargasCZ PAINMGT [facility]   2023  9:10 AM Yohana Mccallum MD AFL Neph [place] None   2023 10:15 AM Allan Peña MD Resp Spec [address] Transitions Subsequent and Final Call    Subsequent and Final Calls  Do you have any ongoing symptoms?: Yes  Onset of Patient-reported symptoms:  In the past 7 days  Patient-reported symptoms: Pain  Have your medications changed?: No  Do you have any questions related to your medications?: No  Do you currently have any active services?: No  Do you have any needs or concerns that I can assist you with?: No  Care Transitions Interventions  Disease Association: Completed  Specialty Service Referral: Declined Other Interventions:             Care Transition Nurse provided contact information for future needs. Plan for follow-up call in 5-7 days based on severity of symptoms and risk factors.   Plan for next call: symptom management-     Marian Agrawal RN

## 2023-02-17 ENCOUNTER — OFFICE VISIT (OUTPATIENT)
Dept: INTERNAL MEDICINE | Age: 78
End: 2023-02-17

## 2023-02-17 VITALS
TEMPERATURE: 97.3 F | OXYGEN SATURATION: 99 % | WEIGHT: 163 LBS | BODY MASS INDEX: 32 KG/M2 | HEIGHT: 60 IN | DIASTOLIC BLOOD PRESSURE: 53 MMHG | HEART RATE: 88 BPM | SYSTOLIC BLOOD PRESSURE: 124 MMHG

## 2023-02-17 DIAGNOSIS — E11.21 TYPE 2 DIABETES MELLITUS WITH DIABETIC NEPHROPATHY, WITHOUT LONG-TERM CURRENT USE OF INSULIN (HCC): ICD-10-CM

## 2023-02-17 DIAGNOSIS — K92.2 GASTROINTESTINAL HEMORRHAGE, UNSPECIFIED GASTROINTESTINAL HEMORRHAGE TYPE: Primary | ICD-10-CM

## 2023-02-17 DIAGNOSIS — I10 PRIMARY HYPERTENSION: ICD-10-CM

## 2023-02-17 DIAGNOSIS — K63.5 MULTIPLE POLYPS OF SIGMOID COLON: ICD-10-CM

## 2023-02-17 DIAGNOSIS — Z09 HOSPITAL DISCHARGE FOLLOW-UP: ICD-10-CM

## 2023-02-17 SDOH — ECONOMIC STABILITY: HOUSING INSECURITY
IN THE LAST 12 MONTHS, WAS THERE A TIME WHEN YOU DID NOT HAVE A STEADY PLACE TO SLEEP OR SLEPT IN A SHELTER (INCLUDING NOW)?: YES

## 2023-02-17 SDOH — ECONOMIC STABILITY: INCOME INSECURITY: HOW HARD IS IT FOR YOU TO PAY FOR THE VERY BASICS LIKE FOOD, HOUSING, MEDICAL CARE, AND HEATING?: SOMEWHAT HARD

## 2023-02-17 SDOH — ECONOMIC STABILITY: FOOD INSECURITY: WITHIN THE PAST 12 MONTHS, THE FOOD YOU BOUGHT JUST DIDN'T LAST AND YOU DIDN'T HAVE MONEY TO GET MORE.: SOMETIMES TRUE

## 2023-02-17 SDOH — ECONOMIC STABILITY: FOOD INSECURITY: WITHIN THE PAST 12 MONTHS, YOU WORRIED THAT YOUR FOOD WOULD RUN OUT BEFORE YOU GOT MONEY TO BUY MORE.: SOMETIMES TRUE

## 2023-02-17 ASSESSMENT — ENCOUNTER SYMPTOMS
CONSTIPATION: 0
SHORTNESS OF BREATH: 0
WHEEZING: 0
BLOOD IN STOOL: 0
COUGH: 0
BACK PAIN: 1
ANAL BLEEDING: 0
ABDOMINAL PAIN: 0

## 2023-02-17 NOTE — PROGRESS NOTES
Post-Discharge Transitional Care Follow Up      Kd Collado   YOB: 1945    Date of Office Visit:  2/17/2023  Date of Hospital Admission: 2/5/23  Date of Hospital Discharge: 2/7/23  Readmission Risk Score (high >=14%. Medium >=10%):Readmission Risk Score: 14.6      Care management risk score Rising risk (score 2-5) and Complex Care (Scores >=6): No Risk Score On File     Non face to face  following discharge, date last encounter closed (first attempt may have been earlier): 02/08/2023     Call initiated 2 business days of discharge: Yes     Gastrointestinal hemorrhage, unspecified gastrointestinal hemorrhage type  -     CBC; Future  Continue PPI 2/day  Avoid NSAID's    Primary hypertension  Controlled    -     Basic Metabolic Panel; Future  Type 2 diabetes mellitus with diabetic nephropathy, without long-term current use of insulin ([facility] [address]  Controlled  Avoid NSAID's  Follow up with PCP    -     Basic Metabolic Panel; Future  -     Protein / Creatinine Ratio, Urine; Future  Hospital discharge follow-up  -     FL DISCHARGE MEDS RECONCILED W/ CURRENT OUTPATIENT MED LIST  -     CBC; Future  Multiple polyps of sigmoid colon  Advised to schedule colonoscopy      Medical Decision Making: moderate complexity and high complexity  Return in 1 month (on 3/17/2023). On this date 2/17/2023 I have spent 40 minutes reviewing previous notes, test results and face to face with the patient discussing the diagnosis and importance of compliance with the treatment plan as well as documenting on the day of the visit. Subjective:   HPI patient comes in for follow-up after recent hospitalization for GI bleed. She had dark starry stools. In the hospital her hemoglobin was 6.3. She was given 1 unit of transfusion. EGD showed multiple duodenal ulcers which were clean-based. She was discharged home on Protonix twice a day which she is taking. She is also taking iron once a day.   She denies any abdominal pain now or tarry stools. She does have a little dark stool but it is from the iron. She was taking ibuprofen on a daily basis because of chronic low back pain. H. pylori was negative. Patient also has hypertension and diabetes with diabetic nephropathy. Creatinine has been elevated. She has not had her urine protein creatinine checked in years. We will have her do a urine protein creatinine ratio along with BMP and CBC for follow-up today. She is advised to follow-up with her PCP in a month's time to go over the results. She is advised to avoid taking ibuprofen. She will follow-up with pain management for her chronic low back pain. She had a recent MRI done and the results were discussed with her. She also has a known history of colon polyps. She had multiple polyps removed 2 years ago. She was supposed to follow-up for a colonoscopy in 6 months but never did. She also was noted to be anemic in December with a hemoglobin of 9.1. Advised patient she needs a follow-up colonoscopy and she is in the process of scheduling this. EGD 02/2023  Findings     Esophagus: Normal.   Stomach: Mild gastric erythema in antrum. This was biopsied with cold biopsy forceps. Stomach otherwise normal.   Duodenum: Multiple clean based duodenal ulcers between 1 and 2 nd portion of duodenum with no high risk stigmata likely the source of recent bleeding. Duodenum otherwise normal.      Recommendations: -Acid suppression with a proton pump inhibitor BID for 8 weeks,   -Await pathology.  -Avoid NSAID's.  - Patient was counseled to get colonoscopy as an outpatient for f/u of large polyp in cecum resected in 2021. She states that she doesn't like to take prep but will f/u with her gastroenterologist.     Pathology  Surgical Pathology Report  SURGICAL PATHOLOGY REPORT  Collected: 02/06/23 5033   Lab status: Final   Resulting lab: [facility] [employer]   Value: -- Diagnosis --   STOMACH, BIOPSIES:   - MILD CHRONIC GASTRITIS.    - NEGATIVE FOR HELICOBACTER PYLORI INFECTION AND INTESTINAL   METAPLASIA. CT abdomen 2023     FINDINGS:   Lower Chest: The lung bases are without consolidation or effusion. The   visualized cardiac structures are unremarkable. Organs: The liver and spleen are normal size and overall attenuation. The   gallbladder, pancreas, and adrenal glands are unremarkable. The kidneys are   without obstructive uropathy. The urinary bladder is contracted. GI/Bowel: The stomach is unremarkable. Loops of small bowel are normal in   caliber without evidence for obstruction. The colon contains air and fecal   residue. There is no free air or free fluid. Pelvis: The uterus is age-appropriate. Peritoneum/Retroperitoneum: The psoas muscles are symmetric. The abdominal   aorta is normal in caliber. The inferior vena cava is unremarkable. There   is no retroperitoneal or mesenteric adenopathy. Bones/Soft Tissues: The extra-abdominal soft tissues are unremarkable. There   is no acute osseous abnormality. Impression   No acute abdominal or pelvic abnormality. Colonoscopy 2021  1. 3 cm laterally spreading carpet like polyp was seen in the cecum. EMR was done . Polyp lifted with Orise. Post lifting the polyp was removed in a piecemeal fashion using hot snare cautery. Post removal residual polyps at the margins were removed with hot forcep's polypectomy. Post removal the margins of the polypectomy site was treated with APC settings 0.5 Flow and 50 gould. Post APC the defect was closed using 6 hemostatic clips. No bleeding at the end of the procedure. 2. 6 mm sessile polyp seen in the descending colon. Cold snare polypectomy done   3. 5 mm sessile polyp seen in the Rectum. Cold forep's polypectomy done   4. Internal hemorrhoids seen during retroflexion view      Recommendations:   Follow with the biopsy results                                     Repeat colon in 6 months Follow up with PCP     Inpatient course: Discharge summary reviewed- see chart. Interval history/Current status: stable    Patient Active Problem List   Diagnosis    S/P total knee arthroplasty    Type 2 diabetes mellitus with diabetic nephropathy, without long-term current use of insulin ([facility] [address]    Hypertension    Asthma    Arthritis    Acquired trigger finger    Osteoarthritis of knee    Morbid obesity with BMI of 40.0-44.9, adult ([facility] [address]    Current mild episode of major depressive disorder without prior episode ([facility] [address]    Bilateral carotid artery stenosis    Other specified glaucoma    Chronic systolic (congestive) heart failure    Lumbar radiculopathy    Lumbar degenerative disc disease    Lumbar spondylosis    Chronic renal disease, stage III [facility]) [[id number]]    Gastrointestinal hemorrhage       Medications listed as ordered at the time of discharge from hospital     Medication List            Accurate as of February 17, 2023 10:36 AM. If you have any questions, ask your nurse or doctor.                 CONTINUE taking these medications      acetaminophen 500 MG tablet  Commonly known as: TYLENOL     albuterol sulfate  (90 Base) MCG/ACT inhaler  Commonly known as: PROVENTIL;VENTOLIN;PROAIR  Inhale 2 puffs into the lungs every 6 hours as needed for Wheezing     aspirin 81 MG tablet     citalopram 40 MG tablet  Commonly known as: CELEXA  TAKE ONE TABLET BY MOUTH DAILY     ferrous sulfate 325 (65 Fe) MG EC tablet  Commonly known as: FE TABS 325  Take 1 tablet by mouth daily (with breakfast)     fluticasone 110 MCG/ACT inhaler  Commonly known as: FLOVENT HFA  Inhale 2 puffs into the lungs 2 times daily     fluticasone 50 MCG/ACT nasal spray  Commonly known as: FLONASE  2 sprays by Nasal route daily     glimepiride 4 MG tablet  Commonly known as: AMARYL  Take 1 tablet by mouth every morning (before breakfast)     Latanoprost 0.005 % Emul     metFORMIN 1000 MG tablet  Commonly known as: GLUCOPHAGE  TAKE ONE TABLET BY MOUTH TWICE A DAY WITH MEALS     pantoprazole 40 MG tablet  Commonly known as: PROTONIX  Take 1 tablet by mouth 2 times daily (before meals)     simvastatin 40 MG tablet  Commonly known as: ZOCOR  TAKE ONE TABLET BY MOUTH ONCE NIGHTLY     valsartan-hydroCHLOROthiazide 160-12.5 MG per tablet  Commonly known as: DIOVAN-HCT  TAKE ONE TABLET BY MOUTH DAILY               Medications marked \"taking\" at this time  Outpatient Medications Marked as Taking for the 2/17/23 encounter (Office Visit) with Jacoby Rodriguez MD   Medication Sig Dispense Refill    ferrous sulfate (FE TABS 325) 325 (65 Fe) MG EC tablet Take 1 tablet by mouth daily (with breakfast) 90 tablet 3    pantoprazole (PROTONIX) 40 MG tablet Take 1 tablet by mouth 2 times daily (before meals) 120 tablet 0    metFORMIN (GLUCOPHAGE) 1000 MG tablet TAKE ONE TABLET BY MOUTH TWICE A DAY WITH MEALS 60 tablet 3    fluticasone (FLOVENT HFA) 110 MCG/ACT inhaler Inhale 2 puffs into the lungs 2 times daily 1 each 11    fluticasone (FLONASE) 50 MCG/ACT nasal spray 2 sprays by Nasal route daily 1 each 11    valsartan-hydroCHLOROthiazide (DIOVAN-HCT) 160-12.5 MG per tablet TAKE ONE TABLET BY MOUTH DAILY 90 tablet 0    simvastatin (ZOCOR) 40 MG tablet TAKE ONE TABLET BY MOUTH ONCE NIGHTLY 90 tablet 2    glimepiride (AMARYL) 4 MG tablet Take 1 tablet by mouth every morning (before breakfast) 90 tablet 2    citalopram (CELEXA) 40 MG tablet TAKE ONE TABLET BY MOUTH DAILY 90 tablet 3    Latanoprost 0.005 % EMUL latanoprost 0.005 % eye drops      albuterol sulfate  (90 Base) MCG/ACT inhaler Inhale 2 puffs into the lungs every 6 hours as needed for Wheezing 1 Inhaler 11    acetaminophen (TYLENOL) 500 MG tablet Take 500 mg by mouth      aspirin 81 MG tablet Take 81 mg by mouth daily          Medications patient taking as of now reconciled against medications ordered at time of hospital discharge: Yes    Review of Systems   Constitutional: Positive for fatigue. Negative for chills and unexpected weight change. Respiratory:  Negative for cough, shortness of breath and wheezing. Cardiovascular:  Negative for chest pain, palpitations and leg swelling. Gastrointestinal:  Negative for abdominal pain, anal bleeding, blood in stool and constipation. Musculoskeletal:  Positive for arthralgias and back pain. Neurological:  Negative for dizziness, weakness and headaches. Objective:    BP (!) 124/53 (Site: Left Upper Arm, Position: Sitting, Cuff Size: Large Adult)   Pulse 88   Temp 97.3 °F (36.3 °C) (Infrared)   Ht 5' (1.524 m)   Wt 163 lb (73.9 kg)   SpO2 99%   BMI 31.83 kg/m²   Physical Exam  Vitals and nursing note reviewed. Constitutional:       Appearance: Normal appearance. HENT:      Head: Normocephalic and atraumatic. Eyes:      Extraocular Movements: Extraocular movements intact. Conjunctiva/sclera: Conjunctivae normal.      Pupils: Pupils are equal, round, and reactive to light. Comments: Pale appearing conjunctiva   Cardiovascular:      Rate and Rhythm: Normal rate and regular rhythm. Heart sounds: No murmur heard. Pulmonary:      Effort: Pulmonary effort is normal.      Breath sounds: Normal breath sounds. No wheezing. Abdominal:      General: Bowel sounds are normal.      Palpations: Abdomen is soft. Tenderness: There is no abdominal tenderness. Neurological:      General: No focal deficit present. Mental Status: She is alert and oriented to person, place, and time. An electronic signature was used to authenticate this note.   --Boris Flowers MD

## 2023-02-21 ENCOUNTER — HOSPITAL ENCOUNTER (OUTPATIENT)
Age: 78
Discharge: HOME | End: 2023-02-21
Payer: MEDICARE

## 2023-02-21 DIAGNOSIS — K92.2 GASTROINTESTINAL HEMORRHAGE, UNSPECIFIED GASTROINTESTINAL HEMORRHAGE TYPE: ICD-10-CM

## 2023-02-21 DIAGNOSIS — E11.21 TYPE 2 DIABETES MELLITUS WITH DIABETIC NEPHROPATHY, WITHOUT LONG-TERM CURRENT USE OF INSULIN (HCC): ICD-10-CM

## 2023-02-21 DIAGNOSIS — I10 PRIMARY HYPERTENSION: ICD-10-CM

## 2023-02-21 DIAGNOSIS — Z09 HOSPITAL DISCHARGE FOLLOW-UP: ICD-10-CM

## 2023-02-21 LAB
ANION GAP SERPL CALCULATED.3IONS-SCNC: 10 MMOL/L (ref 9–17)
BUN SERPL-MCNC: 15 MG/DL (ref 8–23)
CALCIUM SERPL-MCNC: 9.4 MG/DL (ref 8.6–10.4)
CHLORIDE SERPL-SCNC: 105 MMOL/L (ref 98–107)
CO2 SERPL-SCNC: 22 MMOL/L (ref 20–31)
CREAT SERPL-MCNC: 1.05 MG/DL (ref 0.5–0.9)
CREATININE URINE: 310 MG/DL (ref 28–217)
GFR SERPL CREATININE-BSD FRML MDRD: 55 ML/MIN/1.73M2
GLUCOSE SERPL-MCNC: 213 MG/DL (ref 70–99)
HCT VFR BLD AUTO: 31.3 % (ref 36.3–47.1)
HGB BLD-MCNC: 9.5 G/DL (ref 11.9–15.1)
MCH RBC QN AUTO: 27.3 PG (ref 25.2–33.5)
MCHC RBC AUTO-ENTMCNC: 30.4 G/DL (ref 28.4–34.8)
MCV RBC AUTO: 89.9 FL (ref 82.6–102.9)
NRBC AUTOMATED: 0 PER 100 WBC
PDW BLD-RTO: 16.5 % (ref 11.8–14.4)
PLATELET # BLD AUTO: 201 K/UL (ref 138–453)
PMV BLD AUTO: 10.8 FL (ref 8.1–13.5)
POTASSIUM SERPL-SCNC: 3.8 MMOL/L (ref 3.7–5.3)
RBC # BLD: 3.48 M/UL (ref 3.95–5.11)
SODIUM SERPL-SCNC: 137 MMOL/L (ref 135–144)
TOTAL PROTEIN, URINE: 28 MG/DL
URINE TOTAL PROTEIN CREATININE RATIO: 0.09 (ref 0–0.2)
WBC # BLD AUTO: 6.6 K/UL (ref 3.5–11.3)

## 2023-02-21 PROCEDURE — 85027 COMPLETE CBC AUTOMATED: CPT

## 2023-02-21 PROCEDURE — 82570 ASSAY OF URINE CREATININE: CPT

## 2023-02-21 PROCEDURE — 84156 ASSAY OF PROTEIN URINE: CPT

## 2023-02-21 PROCEDURE — 36415 COLL VENOUS BLD VENIPUNCTURE: CPT

## 2023-02-21 PROCEDURE — 80048 BASIC METABOLIC PNL TOTAL CA: CPT

## 2023-02-22 ENCOUNTER — CARE COORDINATION (OUTPATIENT)
Dept: CASE MANAGEMENT | Age: 78
End: 2023-02-22

## 2023-03-01 ENCOUNTER — CARE COORDINATION (OUTPATIENT)
Dept: CASE MANAGEMENT | Age: 78
End: 2023-03-01

## 2023-03-01 NOTE — CARE COORDINATION
[facility] Care Transitions Follow Up Call    Patient Current Location:  Home: [address] [facility]    Care Transition Nurse contacted the patient by telephone to follow up after admission on 3/1/23. Verified name and  with patient as identifiers. Patient: Hiwot Collado  Patient : 1945   MRN: [id number]  Reason for Admission:   Discharge Date: 23 RARS: Readmission Risk Score: 14.6      Needs to be reviewed by the provider   Additional needs identified to be addressed with provider: No  none             Method of communication with provider: none. Writer spoke t patient, she is doing well, reviewed upcoming appointments has appt with pcp on 3/9/23, denies s/s of infection, fever, chills, n/v/d, sob, cp or bloody stools, BS have been wnl, will continue to follow//JU    Addressed changes since last contact:  none  Discussed follow-up appointments. If no appointment was previously scheduled, appointment scheduling offered: Yes. Is follow up appointment scheduled within 7 days of discharge? Yes, 3/9/23.     Follow Up  Future Appointments   Date Time Provider Eulalia Zuleta   3/9/2023  2:00 PM Shanique Echeverria MD [facility] IM [facility]   3/14/2023 11:30 AM Adam Gonzalez DO STV MAUM PN [facility]   3/29/2023 10:45 AM Adam Gonzalez DO STCZ PAINMGT [facility]   2023  9:10 AM Kary Escobedo MD [facility] Ne[facility] None   2023 10:15 AM Allan Castillo MD Resp Spec [address] Transitions Subsequent and Final Call    Schedule Follow Up Appointment with PCP: Completed  Subsequent and Final Calls  Do you have any ongoing symptoms?: No  Have your medications changed?: No  Do you have any questions related to your medications?: No  Do you currently have any active services?: No  Do you have any needs or concerns that I can assist you with?: No  Care Transitions Interventions  Disease Association: Completed  Specialty Service Referral: Declined    Other Interventions:             Care Transition Nurse provided contact information for future needs. Plan for follow-up call in 5-7 days based on severity of symptoms and risk factors.   Plan for next call: symptom management-     Jenny March RN

## 2023-03-09 RX ORDER — CITALOPRAM 40 MG/1
TABLET ORAL
Qty: 90 TABLET | Refills: 3 | Status: SHIPPED | OUTPATIENT
Start: 2023-03-09

## 2023-03-09 NOTE — TELEPHONE ENCOUNTER
Health Maintenance   Topic Date Due    DTaP/Tdap/Td vaccine (1 - Tdap) Never done    Shingles vaccine (1 of 2) Never done    COVID-19 Vaccine (3 - Booster for Paz Peter series) 04/14/2021    Annual Wellness Visit (AWV)  10/07/2021    Lipids  12/03/2023    Depression Monitoring  01/31/2024    DEXA (modify frequency per FRAX score)  Completed    Flu vaccine  Completed    Pneumococcal 65+ years Vaccine  Completed    Hepatitis C screen  Completed    Hepatitis A vaccine  Aged Out    Hib vaccine  Aged Out    Meningococcal (ACWY) vaccine  Aged Out             (applicable per patient's age: Cancer Screenings, Depression Screening, Fall Risk Screening, Immunizations)    Hemoglobin A1C (%)   Date Value   01/31/2023 6.5   06/21/2022 8.4   04/08/2021 6.4     Microalb/Crt.  Ratio (mcg/mg creat)   Date Value   11/05/2019 13     LDL Cholesterol (mg/dL)   Date Value   12/03/2022 83     AST (U/L)   Date Value   02/05/2023 32 (H)     ALT (U/L)   Date Value   02/05/2023 13     BUN (mg/dL)   Date Value   02/21/2023 15      (goal A1C is < 7)   (goal LDL is <100) need 30-50% reduction from baseline     BP Readings from Last 3 Encounters:   02/17/23 (!) 124/53   02/07/23 109/73   01/31/23 109/66    (goal /80)      All Future Testing planned in [facility]:  Lab Frequency Next Occurrence   Protein / Creatinine Ratio, Urine Once 12/07/2022   Sodium, Urine, Random Once 33/57/6950   Basic Metabolic Panel Once 32/30/9505   TRANSFORMINAL LUMBAR SINGLE Once 01/18/2023       Next Visit Date:  Future Appointments   Date Time Provider Eulalia Zuleta   3/14/2023 11:30 AM DO STEPHANIE Abraham [facility] [place]   3/29/2023 10:45 AM Jena Poole DO [address]   4/13/2023 10:40 AM Diana Apple MD [address] IM Nate Frank   4/28/2023  9:10 AM Alisha So MD AFL Neph [facility] None   7/18/2023 10:15 AM Shelia Ramos MD Resp Spec [facility]            Patient Active Problem List:     S/P total knee arthroplasty     Type 2 diabetes mellitus with diabetic nephropathy, without long-term current use of insulin ([facility] [address]     Hypertension     Asthma     Arthritis     Acquired trigger finger     Osteoarthritis of knee     Morbid obesity with BMI of 40.0-44.9, adult (HCC)     Current mild episode of major depressive disorder without prior episode ([facility] [address]     Bilateral carotid artery stenosis     Other specified glaucoma     Chronic systolic (congestive) heart failure     Lumbar radiculopathy     Lumbar degenerative disc disease     Lumbar spondylosis     Chronic renal disease, stage III ([facility] [address] [[id number]]     Gastrointestinal hemorrhage

## 2023-03-10 ENCOUNTER — CARE COORDINATION (OUTPATIENT)
Dept: CASE MANAGEMENT | Age: 78
End: 2023-03-10

## 2023-03-10 NOTE — CARE COORDINATION
[facility] Care Transitions Follow Up Call    Patient Current Location:  Home: [address] [place] [id number]    Care Transition Nurse contacted the patient by telephone to follow up after admission on 3/10/23.  Verified name and  with patient as identifiers.    Patient: La Collado  Patient : 1945   MRN: [id number]  Reason for Admission:   Discharge Date: 23 RARS: Readmission Risk Score: 14.6      Needs to be reviewed by the provider   Additional needs identified to be addressed with provider: No  none             Method of communication with provider: none.    Writer spoke to patient, she is still having some pain in her hips and back, tylenol only helps so much, is going to get a injection at the pain clinic, no c/o fever, chills, n/v/d, sob or chest pain, suggested maybe trying a heating pad or wrapping up in a heated blanket, patient going to try, will continue to follow//Ju    Patient is aware of when to contact MD with any new or worsening symptoms  Report any worsening symptoms to PCP and/or Call 911 and/or GO TO EMERGENCY ROOM   Addressed changes since last contact:  none  Discussed follow-up appointments. If no appointment was previously scheduled, appointment scheduling offered: Yes.   Is follow up appointment scheduled within 7 days of discharge? Yes.    Follow Up  Future Appointments   Date Time Provider Department Center   3/14/2023 11:30 AM DO TEODORO AcevedoV MAUM PN [place]   3/29/2023 10:45 AM DO TEODORO AcevedoCZ PAINMGT [place]   2023 10:40 AM Jeri Parkinson MD [facility] IM [facility]   2023  9:10 AM Seun Pink MD AFL Neph [facility] None   2023 10:15 AM Allan Roberts MD Resp Spec [facility]PP          Care Transitions Subsequent and Final Call    Schedule Follow Up Appointment with PCP: Completed  Subsequent and Final Calls  Do you have any ongoing symptoms?: Yes  Onset of Patient-reported symptoms: Other  Patient-reported symptoms: Pain  Do you have any needs or concerns that  I can assist you with?: No  Care Transitions Interventions  Disease Association: Completed  Specialty Service Referral: Declined    Other Interventions:             Care Transition Nurse provided contact information for future needs. Plan for follow-up call in 3-5 days based on severity of symptoms and risk factors.   Plan for next call: symptom management-     Marian Agrawal RN

## 2023-03-14 ENCOUNTER — HOSPITAL ENCOUNTER (OUTPATIENT)
Dept: PAIN MANAGEMENT | Facility: CLINIC | Age: 78
Discharge: HOME | End: 2023-03-14
Payer: MEDICARE

## 2023-03-14 ENCOUNTER — TELEPHONE (OUTPATIENT)
Dept: INTERNAL MEDICINE | Age: 78
End: 2023-03-14

## 2023-03-14 VITALS
BODY MASS INDEX: 33.38 KG/M2 | HEART RATE: 104 BPM | OXYGEN SATURATION: 98 % | RESPIRATION RATE: 14 BRPM | SYSTOLIC BLOOD PRESSURE: 149 MMHG | TEMPERATURE: 98.1 F | HEIGHT: 60 IN | WEIGHT: 170 LBS | DIASTOLIC BLOOD PRESSURE: 109 MMHG

## 2023-03-14 DIAGNOSIS — R52 PAIN MANAGEMENT: ICD-10-CM

## 2023-03-14 LAB — GLUCOSE BLD-MCNC: 149 MG/DL (ref 65–105)

## 2023-03-14 PROCEDURE — 82947 ASSAY GLUCOSE BLOOD QUANT: CPT

## 2023-03-14 PROCEDURE — 2500000003 HC RX 250 WO HCPCS: Performed by: STUDENT IN AN ORGANIZED HEALTH CARE EDUCATION/TRAINING PROGRAM

## 2023-03-14 PROCEDURE — 64483 NJX AA&/STRD TFRM EPI L/S 1: CPT

## 2023-03-14 PROCEDURE — 2580000003 HC RX 258: Performed by: STUDENT IN AN ORGANIZED HEALTH CARE EDUCATION/TRAINING PROGRAM

## 2023-03-14 PROCEDURE — 6360000002 HC RX W HCPCS: Performed by: STUDENT IN AN ORGANIZED HEALTH CARE EDUCATION/TRAINING PROGRAM

## 2023-03-14 PROCEDURE — 6360000004 HC RX CONTRAST MEDICATION: Performed by: STUDENT IN AN ORGANIZED HEALTH CARE EDUCATION/TRAINING PROGRAM

## 2023-03-14 RX ORDER — SODIUM CHLORIDE 0.9 % (FLUSH) 0.9 %
SYRINGE (ML) INJECTION
Status: COMPLETED | OUTPATIENT
Start: 2023-03-14 | End: 2023-03-14

## 2023-03-14 RX ORDER — DEXAMETHASONE SODIUM PHOSPHATE 10 MG/ML
INJECTION, SOLUTION INTRAMUSCULAR; INTRAVENOUS
Status: COMPLETED | OUTPATIENT
Start: 2023-03-14 | End: 2023-03-14

## 2023-03-14 RX ORDER — LIDOCAINE HYDROCHLORIDE 10 MG/ML
INJECTION, SOLUTION EPIDURAL; INFILTRATION; INTRACAUDAL; PERINEURAL
Status: COMPLETED | OUTPATIENT
Start: 2023-03-14 | End: 2023-03-14

## 2023-03-14 RX ADMIN — IOHEXOL 2 ML: 180 INJECTION INTRAVENOUS at 11:26

## 2023-03-14 RX ADMIN — DEXAMETHASONE SODIUM PHOSPHATE 10 MG: 10 INJECTION, SOLUTION INTRAMUSCULAR; INTRAVENOUS at 11:27

## 2023-03-14 RX ADMIN — DEXAMETHASONE SODIUM PHOSPHATE 10 MG: 10 INJECTION, SOLUTION INTRAMUSCULAR; INTRAVENOUS at 11:29

## 2023-03-14 RX ADMIN — Medication 2 ML: at 11:29

## 2023-03-14 RX ADMIN — LIDOCAINE HYDROCHLORIDE 5 ML: 10 INJECTION, SOLUTION EPIDURAL; INFILTRATION; INTRACAUDAL at 11:27

## 2023-03-14 RX ADMIN — Medication 2 ML: at 11:27

## 2023-03-14 ASSESSMENT — PAIN - FUNCTIONAL ASSESSMENT: PAIN_FUNCTIONAL_ASSESSMENT: 0-10

## 2023-03-14 ASSESSMENT — PAIN SCALES - GENERAL: PAINLEVEL_OUTOF10: 8

## 2023-03-14 ASSESSMENT — PAIN DESCRIPTION - ORIENTATION: ORIENTATION: LOWER

## 2023-03-14 ASSESSMENT — PAIN DESCRIPTION - DESCRIPTORS: DESCRIPTORS: JABBING;BURNING

## 2023-03-14 ASSESSMENT — PAIN DESCRIPTION - LOCATION: LOCATION: BACK

## 2023-03-14 NOTE — DISCHARGE INSTRUCTIONS
Discharge Instructions following Sedation or Anesthesia:  You have  received  a sedative/anesthetic therefore, you should not consume any alcoholic beverages for minimum of 12 hours. Do not drive or operate machinery for 24 hours. Do not sign legal documents for 24 hours. Dizziness, drowsiness, and unsteadiness may occur. Rest when need to. Increase diet as tolerated. Keep up on fluids if diet allows. General Instructions:  Do not take a tub bath for 72 hours after procedure (this includes hot tubs and swimming pools). You may shower, but avoid hot water to injection site. Avoid strenuous activity TODAY especially if you experience dizziness. Remove band-aid the next day. Wash off any residual iodine   Do not use heat, heating pad, or any other heating device over the injection site for 3 days after the procedure. If you experience pain after your procedure, you may continue with your current pain medication as prescribed. (DO NOT INCREASE YOUR PAIN MEDICATION WITHOUT TALKING TO DOCTOR)  Soreness and pain at injection site is common, may use ice to reduce soreness. What To Expect After A Steroid Injection  You should start to feel the effects of the steroid injection in about 48-72 hours  You may experience facial flushing, night sweats and irritability. Other common steroid related side effects are increased appetite, mood elevation, insomnia and fluid retention. These effects usually subside in a few days. Steroids used in epidural injections may cause muscle spasms for a few days. If you are diabetic, your blood sugar may be elevated after your procedure due to the steroids. You will need to monitor your blood sugar more closely while going through a series of injections (Check blood sugar at meals and bedtime for 5 days). You may require adjustment in your diabetic medications, contact your PCP office to discuss.     Call Nona Alejo at [phone] if you experience:   Fever, chills or temperature over 100    Vomiting, Headache, persistent stiff neck, nausea, blurred vision   Difficulty in urinating or unable to urinate with 8 hours   Increase in weakness, numbness or loss of function   Increased redness, swelling or drainage at the injection site

## 2023-03-14 NOTE — TELEPHONE ENCOUNTER
Patient called stating that Glimepiride 4mg is on national backorder. Can you prescribe the 2mg tablets and esribe to pharmacy? Can you prescribe 30 day supply only.

## 2023-03-14 NOTE — OP NOTE
PROCEDURE PERFORMED: Bilateral Lumbar Transforaminal Epidural Steroid Injection using Fluoroscopy    PREOPERATIVE DIAGNOSIS: Bilateral lumbosacral radiculopathy    INDICATIONS: Bilateral radicular leg pain    The patient's history and physical exam were reviewed. The risk, benefits, and alternatives of the procedure were discussed and all questions were answered to the patient's satisfaction. The patient agreed to proceed and written informed consent was obtained. POSTOPERATIVE DIAGNOSIS: Same    PHYSICIAN:  Dr. Arsenio Nesbitt DO    ANESTHESIA:  LOCAL    ASSISTANT:  NONE    PATHOLOGY:  NONE    ESTIMATED BLOOD LOSS:  N/A    IMPLANTS:  NONE    PROCEDURE DESCRIPTION: Bilateral lumbar transforaminal epidural injection using fluoroscopy    Targeted levels: Bilateral lumbar L5 transforaminal epidural space    The patient was placed on the operative bed in prone position. The area was prepped with  Chlorhexidine. The area was then draped in a sterile fashion. An AP fluoroscopic  film was taken to identify the L5 vertebral body, pedicle and superior articulating process. The skin and subcutaneous tissues were anesthetized with 1% lidocaine. Then a 22-gauge 3-1/2 inch Quincke spinal needle was advanced under fluoroscopic guidance using an oblique view just inferior to the pedicle at L5. Then utilizing AP and lateral fluoroscopic views, I confirmed the position of the needle tip to be within the neural foramen. After negative aspiration, Omnipaque was injected under AP view at each level. There was adequate contrast spread along the nerve root and in the epidural space. There was no evidence of intravascular uptake or intrathecal spread in imaging. At this point, after negative aspiration, a total volume of treatment injectate consisting of 1 mL of dexamethasone 10 mg/mL and 2 mL of normal saline preservative-free was easily injected. The needle was then removed.   There was applied pressure to the needle insertion site. The same procedure was performed on the opposite side. The patient did not experience any hemodynamic or neurological sequelae. The patient was transferred to the postoperative care unit in stable condition. Written discharge instructions were given to the patient. COMPLICATIONS:  There were no apparent complications. The patient tolerated the procedure well.

## 2023-03-14 NOTE — H&P
Update History & Physical    The patient's History and Physical of January 18, 2023 was reviewed with the patient and I examined the patient. There was no change. The surgical site was confirmed by the patient and me. Plan: The risks, benefits, expected outcome, and alternative to the recommended procedure have been discussed with the patient. Patient understands and wants to proceed with the procedure. Electronically signed by Penelope Castellanos DO on 3/14/2023 at 10:46 AM    Chronic Pain Clinic Note     Encounter Date: 3/14/2023     SUBJECTIVE:  No chief complaint on file. History of Present Illness:   Paula Collado is a 68 y.o. female who presents with back pain    Medication Refill: n/a    Current Complaints of Pain:   Location: back   Radiation: both legs  Severity:  Severe  Pain Numerical Score -    Average: 10     Highest: 10  Lowest: 0  Character/Quality: Complains of pain that is aching and burning  Timing: Intermittent  Associated symptoms: none  Numbness: yes  Weakness: yes  Exacerbating factors: standing, walking  Alleviating factors: sitting  Length of time pain has been present: Started about 3 months ago  Inciting event/injury: no  Bowel/Bladder incontinence: no  Falls: no  Physical Therapy: no    History of Interventions:   Surgery: 1 - back  Injections: None    Imaging:    MRI Lumbar 01/13/2023    FINDINGS:   BONES/ALIGNMENT: Lumbar levoscoliosis (apex L2-L3). No significant   spondylolisthesis. Vertebral body heights appear preserved. Marrow signal   appears within normal limits. No evidence of acute fracture or aggressive   appearing osseous lesions. Multilevel intervertebral disc space narrowing. SPINAL CORD: The conus terminates normally. SOFT TISSUES: No paraspinal mass identified. L1-L2: Disc osteophyte complex. Ligamentum flavum thickening. Facet   hypertrophy. Mild spinal canal stenosis. Mild bilateral foraminal narrowing.        L2-L3: Suspected prior laminectomy changes. Disc osteophyte complex. Facet   hypertrophy. No significant spinal canal stenosis. Mild/moderate right and   mild left foraminal narrowing. L3-L4: Disc osteophyte complex. Ligamentum flavum thickening. Facet   hypertrophy. Moderate spinal canal stenosis. Moderate right and mild left   foraminal narrowing. L4-L5: Disc osteophyte complex. Ligamentum thickening. Facet hypertrophy   with a left-sided 0.8 cm synovial cyst.  Severe spinal canal stenosis. Mild   right and moderate left foraminal narrowing. L5-S1: Disc bulge. Facet hypertrophy. No significant spinal canal stenosis. Moderate left foraminal narrowing.        Past Medical History:   Diagnosis Date    Acquired trigger finger 4/27/2018    ROSELIA (acute kidney injury) ([facility] [address] 2011    Allergic rhinitis     Anesthesia complication     after or bp dropped kidney labs elevated for few years    Anxiety     Arthritis     Asthma     Carotid stenosis, asymptomatic     16-49%    CKD (chronic kidney disease) stage 3, GFR 30-59 ml/min ([facility])     Diabetes mellitus ([facility])     Dry skin     Dyspnea     Frequency of urination     History of bronchitis     Hyperkalemia 1/24/2014    Hypertension     Knee pain, left     Obesity     Obsessive compulsive disorder     Osteoarthritis of knee 4/27/2018    Post-nasal drip     S/P total knee arthroplasty 1/24/2014    Sleep apnea     Urgency incontinence     Urinary, incontinence, stress female     Wears glasses     Wears partial dentures     upper       Past Surgical History:   Procedure Laterality Date    BACK SURGERY      CARPAL TUNNEL RELEASE Bilateral     10 yrs ago    CATARACT REMOVAL WITH IMPLANT Bilateral     COLONOSCOPY  07/01/2021    COLONOSCOPY POLYPECTOMY HOT BIOPSY performed by Jailene Escalante MD at [facility]    COLONOSCOPY  07/01/2021    COLONOSCOPY W/ ENDOSCOPIC MUCOSAL RESECTION performed by Jailene Escalante MD at [facility]    COLONOSCOPY  07/01/2021 COLONOSCOPY WITH BIOPSY performed by Erinn Salter MD at [address] [facility]      ESOPHAGOGASTRODUODENOSCOPY  2023    EYE SURGERY      JOINT REPLACEMENT      LUMBAR DISCECTOMY      TONSILLECTOMY      TOTAL KNEE ARTHROPLASTY Right     TOTAL KNEE ARTHROPLASTY      TOTAL KNEE ARTHROPLASTY Left 2014    UPPER GASTROINTESTINAL ENDOSCOPY N/A 2023    EGD BIOPSY performed by Miguel Christopher MD at [address] History   Problem Relation Age of Onset    Heart Disease Mother     Heart Disease Brother     Stroke Other     Colon Cancer Other     Coronary Art Dis Other        Social History     Socioeconomic History    Marital status:      Spouse name: Not on file    Number of children: Not on file    Years of education: Not on file    Highest education level: Not on file   Occupational History    Not on file   Tobacco Use    Smoking status: Former     Packs/day: 1.00     Types: Cigarettes     Quit date: 1998     Years since quittin.2     Passive exposure: Past    Smokeless tobacco: Former   Vaping Use    Vaping Use: Never used   Substance and Sexual Activity    Alcohol use: Yes     Alcohol/week: 1.0 standard drink     Types: 1 Glasses of wine per week     Comment: occas    Drug use: No    Sexual activity: Not Currently   Other Topics Concern    Not on file   Social History Narrative    Not on file     Social Determinants of Health     Financial Resource Strain: Medium Risk    Difficulty of Paying Living Expenses: Somewhat hard   Food Insecurity: Food Insecurity Present    Worried About [address] in the Last Year: Sometimes true    Ran Out of Food in the Last Year: Sometimes true   Transportation Needs: No Transportation Needs    Lack of Transportation (Medical): No    Lack of Transportation (Non-Medical):  No   Physical Activity: Not on file   Stress: Not on file   Social Connections: Not on file   Intimate Partner Violence: Not on file Housing Stability: High Risk    Unable to Pay for Housing in the Last Year: Not on file    Number of Places Lived in the Last Year: Not on file    Unstable Housing in the Last Year: Yes       Medications & Allergies:   Current Outpatient Medications   Medication Instructions    acetaminophen (TYLENOL) 500 mg, Oral    albuterol sulfate  (90 Base) MCG/ACT inhaler 2 puffs, Inhalation, EVERY 6 HOURS PRN    aspirin 81 mg, Oral, DAILY    citalopram (CELEXA) 40 MG tablet TAKE ONE TABLET BY MOUTH DAILY    ferrous sulfate (FE TABS 325) 325 mg, Oral, DAILY WITH BREAKFAST    fluticasone (FLONASE) 50 MCG/ACT nasal spray 2 sprays, Nasal, DAILY    fluticasone (FLOVENT HFA) 110 MCG/ACT inhaler 2 puffs, Inhalation, 2 TIMES DAILY    glimepiride (AMARYL) 4 mg, Oral, DAILY BEFORE BREAKFAST    Latanoprost 0.005 % EMUL latanoprost 0.005 % eye drops    metFORMIN (GLUCOPHAGE) 1000 MG tablet TAKE ONE TABLET BY MOUTH TWICE A DAY WITH MEALS    pantoprazole (PROTONIX) 40 mg, Oral, 2 TIMES DAILY BEFORE MEALS    simvastatin (ZOCOR) 40 MG tablet TAKE ONE TABLET BY MOUTH ONCE NIGHTLY    valsartan-hydroCHLOROthiazide (DIOVAN-HCT) 160-12.5 MG per tablet TAKE ONE TABLET BY MOUTH DAILY       Allergies   Allergen Reactions    Aspirin Other (See Comments)     nausea       Review of Systems:   Constitutional: negative for weight changes or fevers  Cardiovascular: negative for chest pain, palpitations, irregular heart beat  Respiratory: negative for dyspnea, cough, wheezing  Gastrointestinal: negative for constipation, diarrhea, nausea  Genitourinary: negative for bowel/bladder incontinence   Musculoskeletal: positive for low back pain  Neurological: Positive for radicular leg pain, leg weakness or numbness/tingling  Behavioral/Psych: negative for anxiety/depression   Hematological: negative for abnormal bleeding, anticoagulation use or antiplatelet use  All other systems reviewed and are negative    OBJECTIVE:    Vitals:    03/14/23 1042   BP: (!) 145/91   Pulse: (!) 116   Resp: (!) 66   Temp: 97.4 °F (36.3 °C)   SpO2: 99%         PHYSICAL EXAM    GENERAL: No acute distress, pleasant, well-appearing  HEENT: Normocephalic, atraumatic, Pupils equal and round  CARDIOVASCULAR: Well perfused, No peripheral cyanosis  PULMONARY: Good chest wall excursion, breathing unlabored  PSYCH: Appropriate affect and insight, non-pressured speech  SKIN: No rashes or lesions  MUSCULOSKELETAL:  Inspection: The back and extremities are symmetric and aligned. Muscle bulk is normal in appearance. Palpation: There is tenderness to palpation along the lumbar paraspinal musculature bilaterally  Lumbar range of motion is full  NEUROMUSCULAR:  Patient ambulates unassisted  Gait is nonantalgic  Sensation to light touch is intact in lower extremities  Strength is full in lower extremities  No ankle clonus    Special Tests:  Lumbar facet loading is positive bilaterally  Seated straight leg raise is positive on right      DIAGNOSIS:  No diagnosis found. ASSESSMENT:    Kathy Collado is a 68 y. o.female who presents with chronic low back pain and right leg pain. To review, patient noticed symptoms 6 months ago without injuries or trauma. She has a history of previous L2-3 lumbar laminectomy. The patient's history and physical examination are consistent with lumbar radiculopathy as the patient has pain starting in the low back radiating down into the right leg. Patient has positive neural tension signs on examination with a positive seated straight leg raise. Her lumbar MRI on 1/13/2023 reveals multilevel degenerative changes with multilevel neural foraminal stenosis and spinal canal stenosis. Therefore, I will plan for bilateral lumbar L5 transforaminal epidural steroid injections. Neurologically, it appears the patient has full strength and normal sensation. There is no evidence of myelopathy on examination. There are no red flags in the patient's history.  The patient has failed conservative measures including greater than 3 medications for pain relief, a self-directed therapy program, as well as activity modification all within the last 6 weeks over 3 months. The patient's pain has been causing worsening quality of life and function. PLAN:  Medications: For nonopioid therapy, the following medications were prescribed:    -Continue medication prescribed by primary care physician    Opioid therapy:  -Not indicated    Interventions:  -Plan for bilateral lumbar L5 transforaminal epidural steroid injections  -Diabetic  -Hold aspirin 7 days prior to procedure    Imaging:  -Reviewed lumbar MRI with patient in room    Behavioral Therapies:  -Continue daily stretching and home exercise program    Referrals:  -Continue physical therapy    Follow-up Plan:  -After procedure    Patient was offered intervention where appropriate. Multi-modal Pain Therapy: The patient was explicitly considered for multimodal and interdisciplinary therapy. Non-opioid and non-pharmacological opportunities to enhance analgesia and quality of life have been and will continue to be pursued. Rich Zee, DO  Interventional Pain Management/PM&R   [facility]    No orders of the defined types were placed in this encounter.

## 2023-03-15 ENCOUNTER — CARE COORDINATION (OUTPATIENT)
Dept: CASE MANAGEMENT | Age: 78
End: 2023-03-15

## 2023-03-15 NOTE — CARE COORDINATION
[facility] Care Transitions Follow Up Call    Patient Current Location:  Home: [address]    Care Transition Nurse contacted the patient by telephone to follow up after admission. Verified name and  with patient as identifiers. Patient: Debria Curling Mix  Patient : 1945   MRN: [id number]  Reason for Admission: GI Bleed  Discharge Date: 23 RARS: Readmission Risk Score: 14.6      Needs to be reviewed by the provider   Additional needs identified to be addressed with provider: No  none             Method of communication with provider: none. Thania Graf had Bilateral lumbar transforaminal epidural injections using fluoroscopy on 3/14/23. She states feeling better but unsure if that is working or not. Denies s/s bleeding, nausea. Eating OK. Follow Up  Future Appointments   Date Time Provider Eulalia Zuleta   3/29/2023 10:45 AM DO Alonzo Ingram   2023 10:40 AM Carmel Lyn MD [facility] IM [email or web address]   2023  9:10 AM Stan Morales MD [facility] None   2023 10:15 AM Tiago Tapia MD Resp Spec [email or web address]     No further follow-up call indicated based on severity of symptoms and risk factors.   Plan for next call: referral to ambulatory care manager-Imer Bailey RN

## 2023-03-16 ENCOUNTER — CARE COORDINATION (OUTPATIENT)
Dept: CARE COORDINATION | Age: 78
End: 2023-03-16

## 2023-03-16 RX ORDER — GLIMEPIRIDE 2 MG/1
4 TABLET ORAL EVERY MORNING
Qty: 60 TABLET | Refills: 1 | Status: SHIPPED | OUTPATIENT
Start: 2023-03-16

## 2023-03-16 NOTE — CARE COORDINATION
Called and spoke with Michael Blanco, she states that things are going along fine and doesn't need any assistance right now. Contact information provided if needs arise in the future. Will remove from needs to enroll.

## 2023-03-29 ENCOUNTER — HOSPITAL ENCOUNTER (OUTPATIENT)
Dept: PAIN MANAGEMENT | Age: 78
Discharge: HOME | End: 2023-03-29
Payer: MEDICARE

## 2023-03-29 VITALS
SYSTOLIC BLOOD PRESSURE: 111 MMHG | HEART RATE: 85 BPM | DIASTOLIC BLOOD PRESSURE: 59 MMHG | TEMPERATURE: 97.3 F | RESPIRATION RATE: 20 BRPM | OXYGEN SATURATION: 96 %

## 2023-03-29 DIAGNOSIS — M51.36 LUMBAR DEGENERATIVE DISC DISEASE: ICD-10-CM

## 2023-03-29 DIAGNOSIS — M54.16 LUMBAR RADICULOPATHY: ICD-10-CM

## 2023-03-29 DIAGNOSIS — M47.817 LUMBOSACRAL SPONDYLOSIS WITHOUT MYELOPATHY: Primary | ICD-10-CM

## 2023-03-29 PROCEDURE — 99213 OFFICE O/P EST LOW 20 MIN: CPT

## 2023-03-29 NOTE — PROGRESS NOTES
the patient's history. The patient has failed conservative measures including greater than 3 medications for pain relief, a self-directed therapy program, as well as activity modification all within the last 6 weeks over 3 months. The patient's pain has been causing worsening quality of life and function. PLAN:  Medications: For nonopioid therapy, the following medications were prescribed:    -Continue medication prescribed by primary care physician    Opioid therapy:  -Not indicated    Interventions:  -Plan for bilateral lumbar L3, L4, L5 medial branch blocks  -Status post bilateral lumbar L5 transforaminal epidural steroid injections on 3/14/2023 with 50% improvement in pain and function  -Diabetic  -No need to hold aspirin    Imaging:  -No new imaging    Behavioral Therapies:  -Continue daily stretching and home exercise program    Referrals:  -None    Follow-up Plan:  -After procedure    Patient was offered intervention where appropriate. Multi-modal Pain Therapy: The patient was explicitly considered for multimodal and interdisciplinary therapy. Non-opioid and non-pharmacological opportunities to enhance analgesia and quality of life have been and will continue to be pursued.     Tommy Velasco, DO  Interventional Pain Management/PM&R   [address]    Orders Placed This Encounter    FACET JOINT L/S     Standing Status:   Future     Standing Expiration Date:   3/29/2024     Scheduling Instructions:      Bilateral lumbar L3, L4, L5 MBB x2      No need to hold ASA    FACET JOINT L/S 2ND LEVEL     Standing Status:   Future     Standing Expiration Date:   3/29/2024

## 2023-04-18 ENCOUNTER — TELEPHONE (OUTPATIENT)
Dept: INTERNAL MEDICINE | Age: 78
End: 2023-04-18

## 2023-04-18 NOTE — TELEPHONE ENCOUNTER
[facility] calling asking for progress note addendum for 4/13/23. Needs to include mention of patrick wheelchair to help with activities of daily living.    Please fax to [phone]

## 2023-04-21 ENCOUNTER — CARE COORDINATION (OUTPATIENT)
Dept: CARE COORDINATION | Age: 78
End: 2023-04-21

## 2023-04-21 NOTE — TELEPHONE ENCOUNTER
Archana Zarco from [address] [place] [address] calling to check on status of paperwork that needs signed. Writer let Archana Zarco know PCP is in office on Tuesdays and Thursdays. Archana Zarco states she will let [place] know.

## 2023-04-21 NOTE — CARE COORDINATION
Called and spoke with Daniel Nielsen, she states that she is in to much pain to talk on the phone right now. She did agree to a call from me on 4/24. RPM Eligible  CKD  Diabetes last A1C 6.5 on 1/31/23  Hypertension  CHF  Asthma  Behavioral health  Back and leg pain.

## 2023-04-24 ENCOUNTER — CARE COORDINATION (OUTPATIENT)
Dept: CARE COORDINATION | Age: 78
End: 2023-04-24

## 2023-04-24 NOTE — CARE COORDINATION
Called an d spoke with Mario Strauss. Introduced myself and provided information on ACM/RPM. She states that she wants to think about it for a few days. She agrees to a call on Friday 4/28 to discuss further. RPM Eligible  CKD  Diabetes last A1C 6.5 on 1/31/23  Hypertension  CHF  Asthma  Behavioral health  Back and leg pain.

## 2023-04-26 NOTE — TELEPHONE ENCOUNTER
01/18/2023   FACET JOINT L/S Once 03/29/2023   FACET JOINT L/S 2ND LEVEL Once 03/29/2023   CBC with Auto Differential Once 04/13/2023               Patient Active Problem List:     S/P total knee arthroplasty     Type 2 diabetes mellitus with diabetic nephropathy, without long-term current use of insulin (HCC)     Hypertension     Asthma     Arthritis     Acquired trigger finger     Osteoarthritis of knee     Morbid obesity with BMI of 40.0-44.9, adult ([facility] [address]     Current mild episode of major depressive disorder without prior episode ([address]     Bilateral carotid artery stenosis     Other specified glaucoma     Chronic systolic (congestive) heart failure     Lumbar radiculopathy     Lumbar degenerative disc disease     Lumbar spondylosis     Chronic renal disease, stage III ([address] [[id number]]     Gastrointestinal hemorrhage     Lumbosacral spondylosis without myelopathy

## 2023-04-27 RX ORDER — GLIMEPIRIDE 2 MG/1
4 TABLET ORAL EVERY MORNING
Qty: 180 TABLET | Refills: 1 | Status: SHIPPED | OUTPATIENT
Start: 2023-04-27

## 2023-04-28 ENCOUNTER — CARE COORDINATION (OUTPATIENT)
Dept: CARE COORDINATION | Age: 78
End: 2023-04-28

## 2023-04-28 NOTE — CARE COORDINATION
Called, message left on voicemail with my contact information and reason for call. This is the 3rd attempt for ACM enrollment. Message sent via My Chart if no return call today will remove from panel.

## 2023-05-02 ENCOUNTER — HOSPITAL ENCOUNTER (OUTPATIENT)
Dept: PAIN MANAGEMENT | Facility: CLINIC | Age: 78
Discharge: HOME | End: 2023-05-02
Payer: MEDICARE

## 2023-05-02 VITALS
DIASTOLIC BLOOD PRESSURE: 72 MMHG | OXYGEN SATURATION: 96 % | TEMPERATURE: 97.7 F | BODY MASS INDEX: 32.59 KG/M2 | SYSTOLIC BLOOD PRESSURE: 123 MMHG | RESPIRATION RATE: 10 BRPM | WEIGHT: 166 LBS | HEIGHT: 60 IN | HEART RATE: 99 BPM

## 2023-05-02 DIAGNOSIS — R52 PAIN MANAGEMENT: ICD-10-CM

## 2023-05-02 LAB — GLUCOSE BLD-MCNC: 170 MG/DL (ref 65–105)

## 2023-05-02 PROCEDURE — 64494 INJ PARAVERT F JNT L/S 2 LEV: CPT

## 2023-05-02 PROCEDURE — 64493 INJ PARAVERT F JNT L/S 1 LEV: CPT | Performed by: STUDENT IN AN ORGANIZED HEALTH CARE EDUCATION/TRAINING PROGRAM

## 2023-05-02 PROCEDURE — 2500000003 HC RX 250 WO HCPCS: Performed by: STUDENT IN AN ORGANIZED HEALTH CARE EDUCATION/TRAINING PROGRAM

## 2023-05-02 PROCEDURE — 64493 INJ PARAVERT F JNT L/S 1 LEV: CPT

## 2023-05-02 PROCEDURE — 64495 INJ PARAVERT F JNT L/S 3 LEV: CPT

## 2023-05-02 PROCEDURE — 82947 ASSAY GLUCOSE BLOOD QUANT: CPT

## 2023-05-02 PROCEDURE — 64494 INJ PARAVERT F JNT L/S 2 LEV: CPT | Performed by: STUDENT IN AN ORGANIZED HEALTH CARE EDUCATION/TRAINING PROGRAM

## 2023-05-02 RX ORDER — LIDOCAINE HYDROCHLORIDE 20 MG/ML
INJECTION, SOLUTION EPIDURAL; INFILTRATION; INTRACAUDAL; PERINEURAL
Status: COMPLETED | OUTPATIENT
Start: 2023-05-02 | End: 2023-05-02

## 2023-05-02 RX ADMIN — LIDOCAINE HYDROCHLORIDE 5 ML: 20 INJECTION, SOLUTION EPIDURAL; INFILTRATION; INTRACAUDAL; PERINEURAL at 10:41

## 2023-05-02 ASSESSMENT — PAIN DESCRIPTION - DESCRIPTORS: DESCRIPTORS: ACHING

## 2023-05-02 ASSESSMENT — PAIN - FUNCTIONAL ASSESSMENT
PAIN_FUNCTIONAL_ASSESSMENT: PREVENTS OR INTERFERES SOME ACTIVE ACTIVITIES AND ADLS
PAIN_FUNCTIONAL_ASSESSMENT: 0-10
PAIN_FUNCTIONAL_ASSESSMENT: 0-10

## 2023-05-02 NOTE — H&P
Update History & Physical    The patient's History and Physical of March 29, 2023 was reviewed with the patient and I examined the patient. There was no change. The surgical site was confirmed by the patient and me. Plan: The risks, benefits, expected outcome, and alternative to the recommended procedure have been discussed with the patient. Patient understands and wants to proceed with the procedure. Electronically signed by Rosa Rob DO on 5/2/2023 at 10:32 AM    Chronic Pain Clinic Note     Encounter Date: 5/2/2023     SUBJECTIVE:  No chief complaint on file. History of Present Illness:   Queenie Collado is a 68 y.o. female who presents with back pain    Medication Refill: n/a    Current Complaints of Pain:   Location: back   Radiation: both legs  Severity:  Severe  Pain Numerical Score -    Average: 10     Highest: 10  Lowest: 0  Character/Quality: Complains of pain that is aching and burning  Timing: Intermittent  Associated symptoms: none  Numbness: yes  Weakness: yes  Exacerbating factors: standing, walking  Alleviating factors: sitting  Length of time pain has been present: Started about 3 months ago  Inciting event/injury: no  Bowel/Bladder incontinence: no  Falls: no  Physical Therapy: no    History of Interventions:   Surgery: 1 - back  Injections: None    Imaging:    MRI Lumbar 01/13/2023    FINDINGS:   BONES/ALIGNMENT: Lumbar levoscoliosis (apex L2-L3). No significant   spondylolisthesis. Vertebral body heights appear preserved. Marrow signal   appears within normal limits. No evidence of acute fracture or aggressive   appearing osseous lesions. Multilevel intervertebral disc space narrowing. SPINAL CORD: The conus terminates normally. SOFT TISSUES: No paraspinal mass identified. L1-L2: Disc osteophyte complex. Ligamentum flavum thickening. Facet   hypertrophy. Mild spinal canal stenosis. Mild bilateral foraminal narrowing.        L2-L3: Suspected prior

## 2023-05-02 NOTE — OP NOTE
PROCEDURE PERFORMED: Bilateral Lumbar medial branch block    PREOPERATIVE DIAGNOSIS: Lumbosacral spondylosis    INDICATIONS: Chronic low back pain    The patient's history and physical exam were reviewed. The risk, benefits, and alternatives of the procedure were discussed and all questions were answered to the patient's satisfaction. The patient agreed to proceed and written informed consent was obtained. POSTOPERATIVE DIAGNOSIS: Lumbar spondylosis    PHYSICIAN:  Dr. Koffi Chung DO    ANESTHESIA:  LOCAL    ASSISTANT:  NONE    PATHOLOGY:  NONE    ESTIMATED BLOOD LOSS:  N/A    IMPLANTS:  NONE    PROCEDURE DESCRIPTION: Diagnostic bilateral lumbar medial branch block using fluoroscopy    The patient was placed on the operative bed in prone position. The area was prepped with  Chlorhexidine. The area was then draped in a sterile fashion. Targeted levels: Bilateral lumbar L3, L4, L5 medial branch block    An AP  fluoroscopy image was used to identify and evelyn Vieira's point at the L3, L4 levels on the targeted side. Additionally, the junction of the SAP and sacral ala was also marked on the same side. A 25-gauge 3-1/2 inch Quincke spinal needle was then advanced toward each of these points under fluoroscopic guidance. Once bone was contacted, negative aspiration was confirmed and 0.5 mL of lidocaine 2% was injected each level. The needles were removed and the needle sites were dressed appropriately. The same procedure was performed on the opposite side. The patient was transferred to the postoperative care unit in stable condition. Written discharge instructions were given to the patient. The patient was given a pain diary upon discharge. COMPLICATIONS:  There were no apparent complications. The patient tolerated the procedure well.

## 2023-05-02 NOTE — DISCHARGE INSTRUCTIONS
You have received a sedative/anesthetic therefore you should not consume any alcoholic beverages for 24 hours. Do not drive or operate machinery for 24 hours. Do not take a tub bath for 72 hours after procedure (this includes hot tubs). You may shower, but avoid hot water to injection site. Avoid strenuous activity TODAY especially if you experience dizziness. Remove band-aid the next day. Wash off any residual iodine 24 hours from today. Do not use heat, heating pad, or any other heating device over the injection site for 3 days after the procedure. If you experience pain after your procedure, you may continue with your current pain medication as prescribed. (DO NOT INCREASE YOUR PAIN MEDICATION WITHOUT TALKING TO DOCTOR)  Soreness and pain at injection site is common, may use ice to reduce soreness. Please complete pain diary as instructed. The office staff will call you to discuss the results of the procedure within 24 hours, please call the office if you do not hear from them.       Call Nona Alejo at [phone] if you experience:   Fever, chills or temperature over 100    Vomiting, headache, persistent stiff neck, nausea or blurred vision   Difficulty urinating or unable to urinate within 8 hours   Increase in weakness, numbness or loss of function of limbs  Increased redness, swelling or drainage at the injection site

## 2023-05-03 ENCOUNTER — TELEPHONE (OUTPATIENT)
Dept: PAIN MANAGEMENT | Age: 78
End: 2023-05-03

## 2023-05-03 NOTE — TELEPHONE ENCOUNTER
Pt called to go over MBB pain diary questions  Pain before MBB with activity - 7  Pain after MBB - 6  Patient states she went out to lunch, took a nap, did housework  Patient reports 30% pain relief     Patient is scheduled for 05/08/2023  MBB on 05/16/2023 PENDING OV

## 2023-05-08 ENCOUNTER — HOSPITAL ENCOUNTER (OUTPATIENT)
Dept: PAIN MANAGEMENT | Age: 78
Discharge: HOME | End: 2023-05-08
Payer: MEDICARE

## 2023-05-08 VITALS — TEMPERATURE: 97.3 F | WEIGHT: 166 LBS | HEIGHT: 60 IN | BODY MASS INDEX: 32.59 KG/M2

## 2023-05-08 DIAGNOSIS — M51.36 LUMBAR DEGENERATIVE DISC DISEASE: ICD-10-CM

## 2023-05-08 DIAGNOSIS — M54.16 LUMBAR RADICULOPATHY: ICD-10-CM

## 2023-05-08 DIAGNOSIS — M47.817 LUMBOSACRAL SPONDYLOSIS WITHOUT MYELOPATHY: Primary | ICD-10-CM

## 2023-05-08 PROCEDURE — 99213 OFFICE O/P EST LOW 20 MIN: CPT

## 2023-05-08 PROCEDURE — 99213 OFFICE O/P EST LOW 20 MIN: CPT | Performed by: STUDENT IN AN ORGANIZED HEALTH CARE EDUCATION/TRAINING PROGRAM

## 2023-05-08 ASSESSMENT — PAIN SCALES - GENERAL: PAINLEVEL_OUTOF10: 8

## 2023-05-08 NOTE — PROGRESS NOTES
Chronic Pain Clinic Note     Encounter Date: 5/8/2023     SUBJECTIVE:  Chief Complaint   Patient presents with    Back Pain       History of Present Illness:   Endy Collado is a 68 y.o. female who presents with back pain    Medication Refill: n/a    Current Complaints of Pain:   Location: back   Radiation: both legs  Severity:  Severe  Pain Numerical Score - 8 today   Average: 10     Highest: 10  Lowest: 0  Character/Quality: Complains of pain that is aching and burning  Timing: Intermittent  Associated symptoms: none  Numbness: yes  Weakness: yes  Exacerbating factors: standing, walking  Alleviating factors: sitting  Length of time pain has been present: Started about 3 months ago  Inciting event/injury: no  Bowel/Bladder incontinence: no  Falls: no  Physical Therapy: no    History of Interventions:   Surgery: 1 - back  Injections: MBB 05/02/2023    Imaging:    MRI Lumbar 01/13/2023    FINDINGS:   BONES/ALIGNMENT: Lumbar levoscoliosis (apex L2-L3). No significant   spondylolisthesis. Vertebral body heights appear preserved. Marrow signal   appears within normal limits. No evidence of acute fracture or aggressive   appearing osseous lesions. Multilevel intervertebral disc space narrowing. SPINAL CORD: The conus terminates normally. SOFT TISSUES: No paraspinal mass identified. L1-L2: Disc osteophyte complex. Ligamentum flavum thickening. Facet   hypertrophy. Mild spinal canal stenosis. Mild bilateral foraminal narrowing. L2-L3: Suspected prior laminectomy changes. Disc osteophyte complex. Facet   hypertrophy. No significant spinal canal stenosis. Mild/moderate right and   mild left foraminal narrowing. L3-L4: Disc osteophyte complex. Ligamentum flavum thickening. Facet   hypertrophy. Moderate spinal canal stenosis. Moderate right and mild left   foraminal narrowing. L4-L5: Disc osteophyte complex. Ligamentum thickening.   Facet hypertrophy   with a left-sided 0.8

## 2023-05-16 ENCOUNTER — HOSPITAL ENCOUNTER (OUTPATIENT)
Dept: PAIN MANAGEMENT | Facility: CLINIC | Age: 78
Discharge: HOME | End: 2023-05-16
Payer: MEDICARE

## 2023-05-16 VITALS
TEMPERATURE: 97.3 F | OXYGEN SATURATION: 96 % | HEART RATE: 84 BPM | HEIGHT: 60 IN | BODY MASS INDEX: 32.39 KG/M2 | DIASTOLIC BLOOD PRESSURE: 64 MMHG | RESPIRATION RATE: 12 BRPM | WEIGHT: 165 LBS | SYSTOLIC BLOOD PRESSURE: 144 MMHG

## 2023-05-16 DIAGNOSIS — R52 PAIN MANAGEMENT: ICD-10-CM

## 2023-05-16 LAB — GLUCOSE BLD-MCNC: 131 MG/DL (ref 65–105)

## 2023-05-16 PROCEDURE — 64494 INJ PARAVERT F JNT L/S 2 LEV: CPT | Performed by: STUDENT IN AN ORGANIZED HEALTH CARE EDUCATION/TRAINING PROGRAM

## 2023-05-16 PROCEDURE — 64493 INJ PARAVERT F JNT L/S 1 LEV: CPT

## 2023-05-16 PROCEDURE — 64495 INJ PARAVERT F JNT L/S 3 LEV: CPT

## 2023-05-16 PROCEDURE — 6360000002 HC RX W HCPCS: Performed by: STUDENT IN AN ORGANIZED HEALTH CARE EDUCATION/TRAINING PROGRAM

## 2023-05-16 PROCEDURE — 2500000003 HC RX 250 WO HCPCS: Performed by: STUDENT IN AN ORGANIZED HEALTH CARE EDUCATION/TRAINING PROGRAM

## 2023-05-16 PROCEDURE — 82947 ASSAY GLUCOSE BLOOD QUANT: CPT

## 2023-05-16 PROCEDURE — 64493 INJ PARAVERT F JNT L/S 1 LEV: CPT | Performed by: STUDENT IN AN ORGANIZED HEALTH CARE EDUCATION/TRAINING PROGRAM

## 2023-05-16 PROCEDURE — 64494 INJ PARAVERT F JNT L/S 2 LEV: CPT

## 2023-05-16 RX ORDER — TRIAMCINOLONE ACETONIDE 40 MG/ML
INJECTION, SUSPENSION INTRA-ARTICULAR; INTRAMUSCULAR
Status: COMPLETED | OUTPATIENT
Start: 2023-05-16 | End: 2023-05-16

## 2023-05-16 RX ORDER — LIDOCAINE HYDROCHLORIDE 20 MG/ML
INJECTION, SOLUTION EPIDURAL; INFILTRATION; INTRACAUDAL; PERINEURAL
Status: COMPLETED | OUTPATIENT
Start: 2023-05-16 | End: 2023-05-16

## 2023-05-16 RX ADMIN — LIDOCAINE HYDROCHLORIDE 5 ML: 20 INJECTION, SOLUTION EPIDURAL; INFILTRATION; INTRACAUDAL; PERINEURAL at 11:06

## 2023-05-16 RX ADMIN — TRIAMCINOLONE ACETONIDE 80 MG: 40 INJECTION, SUSPENSION INTRA-ARTICULAR; INTRAMUSCULAR at 11:06

## 2023-05-16 ASSESSMENT — PAIN DESCRIPTION - DESCRIPTORS: DESCRIPTORS: ACHING;SHARP

## 2023-05-16 ASSESSMENT — PAIN - FUNCTIONAL ASSESSMENT
PAIN_FUNCTIONAL_ASSESSMENT: 0-10
PAIN_FUNCTIONAL_ASSESSMENT: 0-10
PAIN_FUNCTIONAL_ASSESSMENT: PREVENTS OR INTERFERES WITH ALL ACTIVE AND SOME PASSIVE ACTIVITIES

## 2023-05-16 NOTE — DISCHARGE INSTRUCTIONS
You have received a sedative/anesthetic therefore you should not consume any alcoholic beverages for 24 hours. Do not drive or operate machinery for 24 hours. Do not take a tub bath for 72 hours after procedure (this includes hot tubs). You may shower, but avoid hot water to injection site. Avoid strenuous activity TODAY especially if you experience dizziness. Remove band-aid the next day. Wash off any residual iodine 24 hours from today. Do not use heat, heating pad, or any other heating device over the injection site for 3 days after the procedure. If you experience pain after your procedure, you may continue with your current pain medication as prescribed. (DO NOT INCREASE YOUR PAIN MEDICATION WITHOUT TALKING TO DOCTOR)  Soreness and pain at injection site is common, may use ice to reduce soreness. Please complete pain diary as instructed. Office staff will contact you to review results. What To Expect After A Steroid Injection  You should start to feel the effects of the steroid injection in about 48-72 hours  You may experience facial flushing, night sweats and irritability. Other common steroid related side effects are increased appetite, mood elevation, insomnia and fluid retention. These effects usually subside in a few days. Steroids used in epidural injections may cause muscle spasms for a few days. If you are diabetic, your blood sugar may be elevated after your procedure due to the steroids. You will need to monitor your blood sugar more closely while going through a series of injections (Check blood sugar at meals and bedtime for 5 days). You may require adjustment in your diabetic medications, contact your PCP office to discuss.     Call Nona Alejo at [phone] if you experience:   Fever, chills or temperature over 100    Vomiting, headache, persistent stiff neck, nausea or blurred vision   Difficulty urinating or unable to urinate within 8 hours   Increase in

## 2023-05-16 NOTE — OP NOTE
PROCEDURE PERFORMED: Bilateral Lumbar medial branch block    PREOPERATIVE DIAGNOSIS: Lumbosacral spondylosis    INDICATIONS: Chronic low back pain    The patient's history and physical exam were reviewed. The risk, benefits, and alternatives of the procedure were discussed and all questions were answered to the patient's satisfaction. The patient agreed to proceed and written informed consent was obtained. POSTOPERATIVE DIAGNOSIS: Lumbar spondylosis    PHYSICIAN:  Dr. Whitley Frank DO    ANESTHESIA:  LOCAL    ASSISTANT:  NONE    PATHOLOGY:  NONE    ESTIMATED BLOOD LOSS:  N/A    IMPLANTS:  NONE    PROCEDURE DESCRIPTION: Diagnostic bilateral lumbar medial branch block using fluoroscopy    The patient was placed on the operative bed in prone position. The area was prepped with  Chlorhexidine. The area was then draped in a sterile fashion. Targeted levels: Bilateral lumbar L3, L4, L5 medial branch block    An AP  fluoroscopy image was used to identify and evelyn Vieira's point at the L3, L4 levels on the targeted side. Additionally, the junction of the SAP and sacral ala was also marked on the same side. A 25-gauge 3-1/2 inch Quincke spinal needle was then advanced toward each of these points under fluoroscopic guidance. Once bone was contacted, negative aspiration was confirmed and 0.5 mL of lidocaine 2% was injected each level. The needles were removed and the needle sites were dressed appropriately. The same procedure was performed on the opposite side. The patient was transferred to the postoperative care unit in stable condition. Written discharge instructions were given to the patient. The patient was given a pain diary upon discharge. COMPLICATIONS:  There were no apparent complications. The patient tolerated the procedure well.

## 2023-05-16 NOTE — INTERVAL H&P NOTE
Update History & Physical    The patient's History and Physical of May 8, 2023 was reviewed with the patient and I examined the patient. There was no change. The surgical site was confirmed by the patient and me. Plan: The risks, benefits, expected outcome, and alternative to the recommended procedure have been discussed with the patient. Patient understands and wants to proceed with the procedure.      Electronically signed by Arsenio Nesbitt DO on 5/16/2023 at 11:01 AM

## 2023-05-17 ENCOUNTER — TELEPHONE (OUTPATIENT)
Dept: PAIN MANAGEMENT | Age: 78
End: 2023-05-17

## 2023-05-17 DIAGNOSIS — M54.50 CHRONIC BILATERAL LOW BACK PAIN, UNSPECIFIED WHETHER SCIATICA PRESENT: Primary | ICD-10-CM

## 2023-05-17 DIAGNOSIS — G89.29 CHRONIC BILATERAL LOW BACK PAIN, UNSPECIFIED WHETHER SCIATICA PRESENT: Primary | ICD-10-CM

## 2023-05-17 DIAGNOSIS — M47.812 SPONDYLOSIS OF CERVICAL REGION WITHOUT MYELOPATHY OR RADICULOPATHY: ICD-10-CM

## 2023-05-17 NOTE — TELEPHONE ENCOUNTER
Procedure: Bilateral lumbar L3, L4, L5 medial branch block  DOS: 05/16/23  Pain level before procedure with activity 8. Pain with activity after procedure 2.   What activities done the day of procedure Household chores  What percentage of  pain relief from procedure did you receive 100  Success Y  OR Scheduled  6/13 6/20

## 2023-05-25 RX ORDER — SIMVASTATIN 40 MG
40 TABLET ORAL NIGHTLY
Qty: 90 TABLET | Refills: 2 | Status: SHIPPED | OUTPATIENT
Start: 2023-05-25

## 2023-05-25 RX ORDER — VALSARTAN AND HYDROCHLOROTHIAZIDE 160; 12.5 MG/1; MG/1
1 TABLET, FILM COATED ORAL DAILY
Qty: 90 TABLET | Refills: 0 | Status: SHIPPED | OUTPATIENT
Start: 2023-05-25

## 2023-05-25 NOTE — TELEPHONE ENCOUNTER
Medication refill for Metformin, Simvastatin, Diovan    Last visit: 4/13/23  Last Med refill: multiple dates  Does patient have enough medication for 72 hours: Yes    Next Visit Date:  Future Appointments   Date Time Provider Eulalia Zuleta   6/13/2023  1:30 PM Ximena Louise, DO [facility]   6/20/2023 10:15 AM Kane Colmenares, DO [facility]   6/23/2023  2:50 PM Miles Bazzi MD [facility] None   7/18/2023 10:15 AM Allan Ashraf  [address] Maintenance   Topic Date Due    DTaP/Tdap/Td vaccine (1 - Tdap) Never done    Shingles vaccine (1 of 2) Never done    COVID-19 Vaccine (3 - Booster for Pfizer series) 04/14/2021    Annual Wellness Visit (AWV)  10/07/2021    Lipids  12/03/2023    Depression Monitoring  01/31/2024    DEXA (modify frequency per FRAX score)  Completed    Flu vaccine  Completed    Pneumococcal 65+ years Vaccine  Completed    Hepatitis C screen  Completed    Hepatitis A vaccine  Aged Out    Hib vaccine  Aged Out    Meningococcal (ACWY) vaccine  Aged Out    Diabetic foot exam  Discontinued    A1C test (Diabetic or Prediabetic)  Discontinued    Diabetic retinal exam  Discontinued    Colonoscopy  Discontinued       Hemoglobin A1C (%)   Date Value   01/31/2023 6.5   06/21/2022 8.4   04/08/2021 6.4             ( goal A1C is < 7)   Microalb/Crt.  Ratio (mcg/mg creat)   Date Value   11/05/2019 13     LDL Cholesterol (mg/dL)   Date Value   12/03/2022 83   05/04/2020 50       (goal LDL is <100)   AST (U/L)   Date Value   02/05/2023 32 (H)     ALT (U/L)   Date Value   02/05/2023 13     BUN (mg/dL)   Date Value   02/21/2023 15     BP Readings from Last 3 Encounters:   05/16/23 (!) 144/64   05/02/23 123/72   04/28/23 132/78          (goal 120/80)    All Future Testing planned in [facility]  Lab Frequency Next Occurrence   Protein / Creatinine Ratio, Urine Once 12/07/2022   Sodium, Urine, Random Once 63/92/9586   Basic Metabolic Panel Once 30/21/7139   TRANSFORMINAL

## 2023-05-31 ENCOUNTER — HOSPITAL ENCOUNTER (OUTPATIENT)
Age: 78
Discharge: HOME | End: 2023-05-31
Payer: MEDICARE

## 2023-05-31 LAB
25(OH)D3 SERPL-MCNC: 9.4 NG/ML
ANION GAP SERPL CALCULATED.3IONS-SCNC: 17 MMOL/L (ref 9–17)
BUN SERPL-MCNC: 40 MG/DL (ref 8–23)
C3 SERPL-MCNC: 157 MG/DL (ref 90–180)
C4 SERPL-MCNC: 28 MG/DL (ref 10–40)
CA-I BLD-SCNC: 1.32 MMOL/L (ref 1.13–1.33)
CALCIUM SERPL-MCNC: 9.4 MG/DL (ref 8.6–10.4)
CHLORIDE SERPL-SCNC: 104 MMOL/L (ref 98–107)
CO2 SERPL-SCNC: 17 MMOL/L (ref 20–31)
CREAT SERPL-MCNC: 1.3 MG/DL (ref 0.5–0.9)
CREAT UR-MCNC: 176.8 MG/DL (ref 28–217)
FREE KAPPA/LAMBDA RATIO: 1.13 (ref 0.26–1.65)
GFR SERPL CREATININE-BSD FRML MDRD: 42 ML/MIN/1.73M2
GLUCOSE SERPL-MCNC: 96 MG/DL (ref 70–99)
KAPPA LC FREE SER-MCNC: 3.22 MG/DL (ref 0.37–1.94)
LAMBDA LC FREE SERPL-MCNC: 2.85 MG/DL (ref 0.57–2.63)
PHOSPHATE SERPL-MCNC: 4.4 MG/DL (ref 2.6–4.5)
POTASSIUM SERPL-SCNC: 4.8 MMOL/L (ref 3.7–5.3)
PTH-INTACT SERPL-MCNC: 49.1 PG/ML (ref 14–72)
SODIUM SERPL-SCNC: 138 MMOL/L (ref 135–144)

## 2023-05-31 PROCEDURE — 86334 IMMUNOFIX E-PHORESIS SERUM: CPT

## 2023-05-31 PROCEDURE — 82570 ASSAY OF URINE CREATININE: CPT

## 2023-05-31 PROCEDURE — 36415 COLL VENOUS BLD VENIPUNCTURE: CPT

## 2023-05-31 PROCEDURE — 84100 ASSAY OF PHOSPHORUS: CPT

## 2023-05-31 PROCEDURE — 80048 BASIC METABOLIC PNL TOTAL CA: CPT

## 2023-05-31 PROCEDURE — 83521 IG LIGHT CHAINS FREE EACH: CPT

## 2023-05-31 PROCEDURE — 86225 DNA ANTIBODY NATIVE: CPT

## 2023-05-31 PROCEDURE — 82330 ASSAY OF CALCIUM: CPT

## 2023-05-31 PROCEDURE — 83970 ASSAY OF PARATHORMONE: CPT

## 2023-05-31 PROCEDURE — 84156 ASSAY OF PROTEIN URINE: CPT

## 2023-05-31 PROCEDURE — 86038 ANTINUCLEAR ANTIBODIES: CPT

## 2023-05-31 PROCEDURE — 86160 COMPLEMENT ANTIGEN: CPT

## 2023-05-31 PROCEDURE — 82306 VITAMIN D 25 HYDROXY: CPT

## 2023-06-01 LAB — TOTAL PROTEIN, URINE: 13 MG/DL

## 2023-06-01 RX ORDER — SODIUM BICARBONATE 650 MG/1
650 TABLET ORAL DAILY
Qty: 30 TABLET | Refills: 3 | Status: SHIPPED | OUTPATIENT
Start: 2023-06-01 | End: 2023-07-01

## 2023-06-02 LAB
ANA SER QL IA: NEGATIVE
DSDNA IGG SER QL IA: <0.5 IU/ML
INTERPRETATION SERPL IFE-IMP: NORMAL
NUCLEAR IGG SER IA-RTO: <0.1 U/ML
PATHOLOGIST: NORMAL

## 2023-06-20 ENCOUNTER — HOSPITAL ENCOUNTER (OUTPATIENT)
Dept: PAIN MANAGEMENT | Facility: CLINIC | Age: 78
Discharge: HOME | End: 2023-06-20
Payer: MEDICARE

## 2023-06-20 VITALS
RESPIRATION RATE: 16 BRPM | OXYGEN SATURATION: 98 % | BODY MASS INDEX: 32.39 KG/M2 | SYSTOLIC BLOOD PRESSURE: 149 MMHG | WEIGHT: 165 LBS | DIASTOLIC BLOOD PRESSURE: 58 MMHG | TEMPERATURE: 97.9 F | HEIGHT: 60 IN | HEART RATE: 98 BPM

## 2023-06-20 DIAGNOSIS — R52 PAIN MANAGEMENT: ICD-10-CM

## 2023-06-20 LAB — GLUCOSE BLD-MCNC: 125 MG/DL (ref 65–105)

## 2023-06-20 PROCEDURE — 64636 DESTROY L/S FACET JNT ADDL: CPT

## 2023-06-20 PROCEDURE — 64636 DESTROY L/S FACET JNT ADDL: CPT | Performed by: STUDENT IN AN ORGANIZED HEALTH CARE EDUCATION/TRAINING PROGRAM

## 2023-06-20 PROCEDURE — 2500000003 HC RX 250 WO HCPCS: Performed by: STUDENT IN AN ORGANIZED HEALTH CARE EDUCATION/TRAINING PROGRAM

## 2023-06-20 PROCEDURE — 64635 DESTROY LUMB/SAC FACET JNT: CPT | Performed by: STUDENT IN AN ORGANIZED HEALTH CARE EDUCATION/TRAINING PROGRAM

## 2023-06-20 PROCEDURE — 6360000002 HC RX W HCPCS: Performed by: STUDENT IN AN ORGANIZED HEALTH CARE EDUCATION/TRAINING PROGRAM

## 2023-06-20 PROCEDURE — 64635 DESTROY LUMB/SAC FACET JNT: CPT

## 2023-06-20 PROCEDURE — 82947 ASSAY GLUCOSE BLOOD QUANT: CPT

## 2023-06-20 RX ORDER — LIDOCAINE HYDROCHLORIDE 10 MG/ML
INJECTION, SOLUTION EPIDURAL; INFILTRATION; INTRACAUDAL; PERINEURAL
Status: COMPLETED | OUTPATIENT
Start: 2023-06-20 | End: 2023-06-20

## 2023-06-20 RX ORDER — LIDOCAINE HYDROCHLORIDE 20 MG/ML
INJECTION, SOLUTION EPIDURAL; INFILTRATION; INTRACAUDAL; PERINEURAL
Status: COMPLETED | OUTPATIENT
Start: 2023-06-20 | End: 2023-06-20

## 2023-06-20 RX ORDER — TRIAMCINOLONE ACETONIDE 40 MG/ML
INJECTION, SUSPENSION INTRA-ARTICULAR; INTRAMUSCULAR
Status: COMPLETED | OUTPATIENT
Start: 2023-06-20 | End: 2023-06-20

## 2023-06-20 RX ADMIN — LIDOCAINE HYDROCHLORIDE 2 ML: 10 INJECTION, SOLUTION EPIDURAL; INFILTRATION; INTRACAUDAL at 10:53

## 2023-06-20 RX ADMIN — LIDOCAINE HYDROCHLORIDE 5 ML: 20 INJECTION, SOLUTION EPIDURAL; INFILTRATION; INTRACAUDAL; PERINEURAL at 10:48

## 2023-06-20 RX ADMIN — TRIAMCINOLONE ACETONIDE 40 MG: 40 INJECTION, SUSPENSION INTRA-ARTICULAR; INTRAMUSCULAR at 10:53

## 2023-06-20 RX ADMIN — LIDOCAINE HYDROCHLORIDE 3 ML: 10 INJECTION, SOLUTION EPIDURAL; INFILTRATION; INTRACAUDAL at 10:46

## 2023-06-20 ASSESSMENT — PAIN - FUNCTIONAL ASSESSMENT
PAIN_FUNCTIONAL_ASSESSMENT: PREVENTS OR INTERFERES SOME ACTIVE ACTIVITIES AND ADLS
PAIN_FUNCTIONAL_ASSESSMENT: 0-10

## 2023-06-20 ASSESSMENT — PAIN SCALES - GENERAL: PAINLEVEL_OUTOF10: 2

## 2023-06-20 ASSESSMENT — PAIN DESCRIPTION - DESCRIPTORS: DESCRIPTORS: SHARP;ACHING

## 2023-06-20 NOTE — DISCHARGE INSTRUCTIONS

## 2023-06-20 NOTE — OP NOTE
PROCEDURE PERFORMED: Left Lumbar Medial Branch Radiofrequency Ablation using Fluoroscopy    PREOPERATIVE DIAGNOSIS: Lumbosacral spondylosis    INDICATIONS: Chronic low back pain    The patient's history and physical exam were reviewed. The risk, benefits, and alternatives of the procedure were discussed and all questions were answered to the patient's satisfaction. The patient agreed to proceed and written informed consent was obtained. POSTOPERATIVE DIAGNOSIS: Lumbosacral spondylosis    PHYSICIAN:  Dr. Atif Rodríguez DO    ANESTHESIA:  LOCAL    ASSISTANT:  NONE    PATHOLOGY:  NONE    ESTIMATED BLOOD LOSS:  N/A    IMPLANTS:  NONE    PROCEDURE DESCRIPTION: Left lumbar medial branch radiofrequency ablation using fluoroscopy    The patient was placed on the operative bed in prone position. The area was prepped with  Chlorhexidine. The area was then draped in a sterile fashion. Targeted levels: Left lumbar L3, L4, L5 medial branch radiofrequency ablation    An AP  fluoroscopy image was used to identify and evelyn Vieira's point at the L3, L4 levels on the targeted side. Additionally, the junction of the SAP and sacral ala was also marked on the same side. The skin and subcutaneous tissues were then anesthetized with 1% lidocaine. At each lumbar medial branch, a 20-gauge, 100 mm curved with 10 mm active tip probe was advanced under AP fluoroscopic projections until bone was contacted along the medial aspect of the transverse process. Aspiration of each needle was negative for blood, CSF and paresthesia prior to injection. Motor stimulation at 2 Hz and 1.2 V was negative. Then, after negative aspiration, 1 mL lidocaine 2% was injected at each level prior to lesioning which was performed at 80°C for 90 seconds. Once the lesion was complete, injectate solution containing Kenalog 40 mg and 2 mL lidocaine 1% was injected at each site with a total of 1 mL. The probes were then removed.   The needle sites

## 2023-07-19 ENCOUNTER — HOSPITAL ENCOUNTER (OUTPATIENT)
Dept: PAIN MANAGEMENT | Age: 78
Discharge: HOME | End: 2023-07-19
Payer: MEDICARE

## 2023-07-19 VITALS — HEIGHT: 60 IN | BODY MASS INDEX: 31.8 KG/M2 | TEMPERATURE: 97.3 F | WEIGHT: 162 LBS

## 2023-07-19 DIAGNOSIS — M47.817 LUMBOSACRAL SPONDYLOSIS WITHOUT MYELOPATHY: ICD-10-CM

## 2023-07-19 DIAGNOSIS — M47.812 SPONDYLOSIS OF CERVICAL REGION WITHOUT MYELOPATHY OR RADICULOPATHY: ICD-10-CM

## 2023-07-19 DIAGNOSIS — M51.36 LUMBAR DEGENERATIVE DISC DISEASE: Primary | ICD-10-CM

## 2023-07-19 DIAGNOSIS — M54.16 LUMBAR RADICULOPATHY: ICD-10-CM

## 2023-07-19 PROCEDURE — 99213 OFFICE O/P EST LOW 20 MIN: CPT

## 2023-07-19 PROCEDURE — 99213 OFFICE O/P EST LOW 20 MIN: CPT | Performed by: STUDENT IN AN ORGANIZED HEALTH CARE EDUCATION/TRAINING PROGRAM

## 2023-07-19 ASSESSMENT — PAIN SCALES - GENERAL: PAINLEVEL_OUTOF10: 6

## 2023-07-19 NOTE — PROGRESS NOTES
Chronic Pain Clinic Note     Encounter Date: 7/19/2023     SUBJECTIVE:  Chief Complaint   Patient presents with    Back Pain     Follow up RFA       History of Present Illness:   Sean Collado is a 66 y.o. female who presents with back pain    Medication Refill: n/a    Current Complaints of Pain:   Location: back   Radiation: both legs  Severity:  Severe  Pain Numerical Score - 6 today   Average: 5 since RFA     Highest: 10  Lowest: 0  Character/Quality: Complains of pain that is aching and burning  Timing: Intermittent  Associated symptoms: none  Numbness: yes  Weakness: yes  Exacerbating factors: standing, walking  Alleviating factors: sitting  Length of time pain has been present: Started about 3 months ago  Inciting event/injury: no  Bowel/Bladder incontinence: no  Falls: 2-3 in the past month  Physical Therapy: no    History of Interventions:   Surgery: 1 - back  Injections: RFA 06/20/2023 - pt states 100% pain relief    Imaging:    MRI Lumbar 01/13/2023    FINDINGS:   BONES/ALIGNMENT: Lumbar levoscoliosis (apex L2-L3). No significant   spondylolisthesis. Vertebral body heights appear preserved. Marrow signal   appears within normal limits. No evidence of acute fracture or aggressive   appearing osseous lesions. Multilevel intervertebral disc space narrowing. SPINAL CORD: The conus terminates normally. SOFT TISSUES: No paraspinal mass identified. L1-L2: Disc osteophyte complex. Ligamentum flavum thickening. Facet   hypertrophy. Mild spinal canal stenosis. Mild bilateral foraminal narrowing. L2-L3: Suspected prior laminectomy changes. Disc osteophyte complex. Facet   hypertrophy. No significant spinal canal stenosis. Mild/moderate right and   mild left foraminal narrowing. L3-L4: Disc osteophyte complex. Ligamentum flavum thickening. Facet   hypertrophy. Moderate spinal canal stenosis. Moderate right and mild left   foraminal narrowing.        L4-L5: Disc osteophyte

## 2023-08-08 ENCOUNTER — HOSPITAL ENCOUNTER (OUTPATIENT)
Age: 78
Discharge: HOME | End: 2023-08-08
Payer: MEDICARE

## 2023-08-08 DIAGNOSIS — N18.31 STAGE 3A CHRONIC KIDNEY DISEASE (HCC): ICD-10-CM

## 2023-08-08 DIAGNOSIS — N18.9 CHRONIC KIDNEY DISEASE, UNSPECIFIED CKD STAGE: ICD-10-CM

## 2023-08-08 LAB
ANION GAP SERPL CALCULATED.3IONS-SCNC: 15 MMOL/L (ref 9–17)
BUN SERPL-MCNC: 17 MG/DL (ref 8–23)
CALCIUM SERPL-MCNC: 9.7 MG/DL (ref 8.6–10.4)
CHLORIDE SERPL-SCNC: 103 MMOL/L (ref 98–107)
CO2 SERPL-SCNC: 22 MMOL/L (ref 20–31)
CREAT SERPL-MCNC: 0.9 MG/DL (ref 0.5–0.9)
GFR SERPL CREATININE-BSD FRML MDRD: >60 ML/MIN/1.73M2
GLUCOSE SERPL-MCNC: 164 MG/DL (ref 70–99)
POTASSIUM SERPL-SCNC: 4.5 MMOL/L (ref 3.7–5.3)
SODIUM SERPL-SCNC: 140 MMOL/L (ref 135–144)

## 2023-08-08 PROCEDURE — 36415 COLL VENOUS BLD VENIPUNCTURE: CPT

## 2023-08-08 PROCEDURE — 80048 BASIC METABOLIC PNL TOTAL CA: CPT

## 2023-08-29 ENCOUNTER — OFFICE VISIT (OUTPATIENT)
Dept: PULMONOLOGY | Age: 78
End: 2023-08-29
Payer: MEDICARE

## 2023-08-29 VITALS
WEIGHT: 167 LBS | OXYGEN SATURATION: 98 % | BODY MASS INDEX: 31.53 KG/M2 | HEIGHT: 61 IN | SYSTOLIC BLOOD PRESSURE: 134 MMHG | HEART RATE: 99 BPM | RESPIRATION RATE: 14 BRPM | DIASTOLIC BLOOD PRESSURE: 73 MMHG

## 2023-08-29 DIAGNOSIS — J30.9 ALLERGIC RHINITIS, UNSPECIFIED SEASONALITY, UNSPECIFIED TRIGGER: ICD-10-CM

## 2023-08-29 DIAGNOSIS — G47.33 OBSTRUCTIVE SLEEP APNEA: ICD-10-CM

## 2023-08-29 DIAGNOSIS — R06.09 DYSPNEA ON EXERTION: ICD-10-CM

## 2023-08-29 DIAGNOSIS — J45.30 MILD PERSISTENT ASTHMA WITHOUT COMPLICATION: Primary | ICD-10-CM

## 2023-08-29 PROCEDURE — G8427 DOCREV CUR MEDS BY ELIG CLIN: HCPCS | Performed by: INTERNAL MEDICINE

## 2023-08-29 PROCEDURE — 1036F TOBACCO NON-USER: CPT | Performed by: INTERNAL MEDICINE

## 2023-08-29 PROCEDURE — 1123F ACP DISCUSS/DSCN MKR DOCD: CPT | Performed by: INTERNAL MEDICINE

## 2023-08-29 PROCEDURE — 3075F SYST BP GE 130 - 139MM HG: CPT | Performed by: INTERNAL MEDICINE

## 2023-08-29 PROCEDURE — 3078F DIAST BP <80 MM HG: CPT | Performed by: INTERNAL MEDICINE

## 2023-08-29 PROCEDURE — 99213 OFFICE O/P EST LOW 20 MIN: CPT | Performed by: INTERNAL MEDICINE

## 2023-08-29 PROCEDURE — 1090F PRES/ABSN URINE INCON ASSESS: CPT | Performed by: INTERNAL MEDICINE

## 2023-08-29 PROCEDURE — G8399 PT W/DXA RESULTS DOCUMENT: HCPCS | Performed by: INTERNAL MEDICINE

## 2023-08-29 PROCEDURE — G8417 CALC BMI ABV UP PARAM F/U: HCPCS | Performed by: INTERNAL MEDICINE

## 2023-08-29 RX ORDER — SODIUM BICARBONATE 325 MG/1
325 TABLET ORAL 4 TIMES DAILY
COMMUNITY

## 2023-08-29 NOTE — PROGRESS NOTES
an active lifestyle    Wt loss is recommended and discussed  Follow good sleep hygeine instructions  She was not compliant with cpap and does not have cpap any more and and not interested in using CPAP    RTC in 6 months      Livier Cabrera MD, MD             8/29/2023, 9:41 AM     Please note that this chart was generated using voice recognition Dragon dictation software. Although every effort was made to ensure the accuracy of this automated transcription, some errors in transcription may have occurred.

## 2023-10-26 ENCOUNTER — OFFICE VISIT (OUTPATIENT)
Dept: INTERNAL MEDICINE | Age: 78
End: 2023-10-26
Payer: MEDICARE

## 2023-10-26 VITALS
SYSTOLIC BLOOD PRESSURE: 154 MMHG | HEIGHT: 61 IN | TEMPERATURE: 97.5 F | DIASTOLIC BLOOD PRESSURE: 80 MMHG | WEIGHT: 171 LBS | HEART RATE: 89 BPM | OXYGEN SATURATION: 97 % | BODY MASS INDEX: 32.28 KG/M2

## 2023-10-26 DIAGNOSIS — F32.0 CURRENT MILD EPISODE OF MAJOR DEPRESSIVE DISORDER WITHOUT PRIOR EPISODE (HCC): ICD-10-CM

## 2023-10-26 DIAGNOSIS — E11.21 TYPE 2 DIABETES MELLITUS WITH DIABETIC NEPHROPATHY, WITHOUT LONG-TERM CURRENT USE OF INSULIN (HCC): Primary | Chronic | ICD-10-CM

## 2023-10-26 DIAGNOSIS — I10 PRIMARY HYPERTENSION: ICD-10-CM

## 2023-10-26 DIAGNOSIS — Z91.81 AT HIGH RISK FOR FALLS: ICD-10-CM

## 2023-10-26 DIAGNOSIS — Z23 NEED FOR INFLUENZA VACCINATION: ICD-10-CM

## 2023-10-26 LAB — HBA1C MFR BLD: 7.4 %

## 2023-10-26 PROCEDURE — 3078F DIAST BP <80 MM HG: CPT | Performed by: INTERNAL MEDICINE

## 2023-10-26 PROCEDURE — 83036 HEMOGLOBIN GLYCOSYLATED A1C: CPT | Performed by: INTERNAL MEDICINE

## 2023-10-26 PROCEDURE — 1123F ACP DISCUSS/DSCN MKR DOCD: CPT | Performed by: INTERNAL MEDICINE

## 2023-10-26 PROCEDURE — G8427 DOCREV CUR MEDS BY ELIG CLIN: HCPCS | Performed by: INTERNAL MEDICINE

## 2023-10-26 PROCEDURE — G8399 PT W/DXA RESULTS DOCUMENT: HCPCS | Performed by: INTERNAL MEDICINE

## 2023-10-26 PROCEDURE — 1090F PRES/ABSN URINE INCON ASSESS: CPT | Performed by: INTERNAL MEDICINE

## 2023-10-26 PROCEDURE — 3074F SYST BP LT 130 MM HG: CPT | Performed by: INTERNAL MEDICINE

## 2023-10-26 PROCEDURE — G8417 CALC BMI ABV UP PARAM F/U: HCPCS | Performed by: INTERNAL MEDICINE

## 2023-10-26 PROCEDURE — G8484 FLU IMMUNIZE NO ADMIN: HCPCS | Performed by: INTERNAL MEDICINE

## 2023-10-26 PROCEDURE — 3051F HG A1C>EQUAL 7.0%<8.0%: CPT | Performed by: INTERNAL MEDICINE

## 2023-10-26 PROCEDURE — G0008 ADMIN INFLUENZA VIRUS VAC: HCPCS | Performed by: INTERNAL MEDICINE

## 2023-10-26 PROCEDURE — 1036F TOBACCO NON-USER: CPT | Performed by: INTERNAL MEDICINE

## 2023-10-26 PROCEDURE — 99214 OFFICE O/P EST MOD 30 MIN: CPT | Performed by: INTERNAL MEDICINE

## 2023-10-26 RX ORDER — BUPROPION HYDROCHLORIDE 150 MG/1
150 TABLET ORAL EVERY MORNING
Qty: 90 TABLET | Refills: 1 | Status: SHIPPED | OUTPATIENT
Start: 2023-10-26

## 2023-10-26 ASSESSMENT — ENCOUNTER SYMPTOMS
RESPIRATORY NEGATIVE: 1
GASTROINTESTINAL NEGATIVE: 1
BACK PAIN: 1
ALLERGIC/IMMUNOLOGIC NEGATIVE: 1
EYES NEGATIVE: 1

## 2023-10-26 NOTE — PROGRESS NOTES
134/73   06/23/23 104/60       Physical Examination: General appearance - alert, well appearing, and in no distress  Mental status - normal mood, behavior, speech, dress, motor activity, and thought processes  Chest - clear to auscultation, no wheezes, rales or rhonchi, symmetric air entry  Heart - normal rate and regular rhythm  Abdomen - bowel sounds normal  Back exam - limited range of motion  Neurological - alert, oriented, normal speech, no focal findings or movement disorder noted  Musculoskeletal - no muscular tenderness noted  Extremities - pedal edema trace +    LABORATORY FINDINGS:    CBC:   Lab Results   Component Value Date/Time    WBC 6.6 02/21/2023 11:40 AM    HGB 9.5 02/21/2023 11:40 AM     02/21/2023 11:40 AM     BMP:    Lab Results   Component Value Date/Time     08/08/2023 11:19 AM    K 4.5 08/08/2023 11:19 AM     08/08/2023 11:19 AM    CO2 22 08/08/2023 11:19 AM    BUN 17 08/08/2023 11:19 AM    CREATININE 0.9 08/08/2023 11:19 AM    GLUCOSE 164 08/08/2023 11:19 AM    GLUCOSE 85 09/30/2011 09:25 AM     Hemoglobin A1C:   Lab Results   Component Value Date/Time    LABA1C 6.5 01/31/2023 10:37 AM     Microalbumin Urine:   Lab Results   Component Value Date/Time    MICROALBUR 35 11/05/2019 09:33 AM     Lipid profile:   Lab Results   Component Value Date/Time    CHOL 140 05/04/2020 12:27 PM    TRIG 100 05/04/2020 12:27 PM    HDL 53 12/03/2022 11:33 AM     Thyroid functions:   Lab Results   Component Value Date/Time    TSH 1.22 12/03/2022 11:33 AM      Hepatic functions:   Lab Results   Component Value Date/Time    ALT 13 02/05/2023 12:15 PM    AST 32 02/05/2023 12:15 PM    PROT 7.0 02/05/2023 12:15 PM    BILITOT 0.4 02/05/2023 12:15 PM    LABALBU 3.9 02/05/2023 12:15 PM     Urine Analysis: No results found for: \"LABURIN\"    HEALTH MAINTENANCE:      Health Maintenance Due   Topic Date Due    Hepatitis B vaccine (1 of 3 - Risk 3-dose series) Never done    COVID-19 Vaccine (3 - [employer]

## 2023-11-07 ENCOUNTER — TELEMEDICINE (OUTPATIENT)
Dept: INTERNAL MEDICINE | Age: 78
End: 2023-11-07

## 2023-11-07 DIAGNOSIS — Z00.00 MEDICARE ANNUAL WELLNESS VISIT, SUBSEQUENT: Primary | ICD-10-CM

## 2023-11-07 ASSESSMENT — COLUMBIA-SUICIDE SEVERITY RATING SCALE - C-SSRS
4. HAVE YOU HAD THESE THOUGHTS AND HAD SOME INTENTION OF ACTING ON THEM?: NO
7. DID THIS OCCUR IN THE LAST THREE MONTHS: NO
3. HAVE YOU BEEN THINKING ABOUT HOW YOU MIGHT KILL YOURSELF?: NO
5. HAVE YOU STARTED TO WORK OUT OR WORKED OUT THE DETAILS OF HOW TO KILL YOURSELF? DO YOU INTEND TO CARRY OUT THIS PLAN?: NO

## 2023-11-07 ASSESSMENT — PATIENT HEALTH QUESTIONNAIRE - PHQ9
SUM OF ALL RESPONSES TO PHQ QUESTIONS 1-9: 6
6. FEELING BAD ABOUT YOURSELF - OR THAT YOU ARE A FAILURE OR HAVE LET YOURSELF OR YOUR FAMILY DOWN: 0
SUM OF ALL RESPONSES TO PHQ QUESTIONS 1-9: 6
1. LITTLE INTEREST OR PLEASURE IN DOING THINGS: 1
5. POOR APPETITE OR OVEREATING: 1
2. FEELING DOWN, DEPRESSED OR HOPELESS: 2
4. FEELING TIRED OR HAVING LITTLE ENERGY: 1
8. MOVING OR SPEAKING SO SLOWLY THAT OTHER PEOPLE COULD HAVE NOTICED. OR THE OPPOSITE, BEING SO FIGETY OR RESTLESS THAT YOU HAVE BEEN MOVING AROUND A LOT MORE THAN USUAL: 0
SUM OF ALL RESPONSES TO PHQ QUESTIONS 1-9: 6
3. TROUBLE FALLING OR STAYING ASLEEP: 1
SUM OF ALL RESPONSES TO PHQ9 QUESTIONS 1 & 2: 3
SUM OF ALL RESPONSES TO PHQ QUESTIONS 1-9: 6
10. IF YOU CHECKED OFF ANY PROBLEMS, HOW DIFFICULT HAVE THESE PROBLEMS MADE IT FOR YOU TO DO YOUR WORK, TAKE CARE OF THINGS AT HOME, OR GET ALONG WITH OTHER PEOPLE: 1
9. THOUGHTS THAT YOU WOULD BE BETTER OFF DEAD, OR OF HURTING YOURSELF: 0
7. TROUBLE CONCENTRATING ON THINGS, SUCH AS READING THE NEWSPAPER OR WATCHING TELEVISION: 0

## 2023-11-07 ASSESSMENT — LIFESTYLE VARIABLES
HOW MANY STANDARD DRINKS CONTAINING ALCOHOL DO YOU HAVE ON A TYPICAL DAY: 1 OR 2
HOW OFTEN DO YOU HAVE A DRINK CONTAINING ALCOHOL: MONTHLY OR LESS

## 2023-11-07 NOTE — PROGRESS NOTES
MG tablet Take 2 tablets by mouth 4 times daily Yes Marianna Baca MD   simvastatin (ZOCOR) 40 MG tablet Take 1 tablet by mouth nightly Yes Gamal Izaguirre MD   metFORMIN (GLUCOPHAGE) 1000 MG tablet Take 1 tablet by mouth 2 times daily (with meals) with meals Yes Gamal Izaguirre MD   valsartan-hydroCHLOROthiazide (DIOVAN-HCT) 160-12.5 MG per tablet Take 1 tablet by mouth daily Yes Gamal Izaguirre MD   glimepiride (AMARYL) 2 MG tablet Take 2 tablets by mouth every morning Yes Gamal Izaguirre MD   fluticasone (FLOVENT HFA) 110 MCG/ACT inhaler Inhale 2 puffs into the lungs 2 times daily Yes Дмитрий Kirkland MD   fluticasone (FLONASE) 50 MCG/ACT nasal spray 2 sprays by Nasal route daily Yes Дмитрий Kirkland MD   Latanoprost 0.005 % EMUL latanoprost 0.005 % eye drops Yes Marianna Baca MD   albuterol sulfate  (90 Base) MCG/ACT inhaler Inhale 2 puffs into the lungs every 6 hours as needed for Wheezing Yes Дмитрий Kirkland MD   acetaminophen (TYLENOL) 500 MG tablet Take 1 tablet by mouth Yes Marianna Baca MD   aspirin 81 MG tablet Take 1 tablet by mouth daily Yes Marianna Baca MD   vitamin D (CHOLECALCIFEROL) 52759 UNIT CAPS Take 1 capsule by mouth once a week for 16 doses  Dana Damon MD       [facility] (Including outside providers/suppliers regularly involved in providing care):   Patient Care Team:  Gamal Izaguirre MD as PCP - General (Internal Medicine)  Gamal Izaguirre MD as PCP - Empaneled Provider  Дмитрий Kirkland MD as Consulting Physician (Pulmonology)  Dana Damon MD as Consulting Physician (Nephrology)     Reviewed and updated this visit:  Tobacco  Allergies  Meds  Med Hx  Surg Hx  Soc Hx  Fam Hx      I, Estee Grider RN, 11/7/2023, performed the documented evaluation under the direct supervision of the attending physician. La VISH Collado, was evaluated through a synchronous (real-time) audio-video encounter.  The patient (or guardian if applicable) is aware that this is a

## 2023-12-07 ENCOUNTER — HOSPITAL ENCOUNTER (OUTPATIENT)
Age: 78
Setting detail: SPECIMEN
Discharge: HOME | End: 2023-12-07
Payer: MEDICARE

## 2023-12-07 LAB
25(OH)D3 SERPL-MCNC: 46 NG/ML (ref 30–100)
ANION GAP SERPL CALCULATED.3IONS-SCNC: 14 MMOL/L (ref 9–16)
BUN SERPL-MCNC: 15 MG/DL (ref 8–23)
CALCIUM SERPL-MCNC: 9.6 MG/DL (ref 8.6–10.4)
CHLORIDE SERPL-SCNC: 101 MMOL/L (ref 98–107)
CO2 SERPL-SCNC: 23 MMOL/L (ref 20–31)
CREAT SERPL-MCNC: 1 MG/DL (ref 0.5–0.9)
CREAT UR-MCNC: 285.6 MG/DL (ref 28–217)
GFR SERPL CREATININE-BSD FRML MDRD: 58 ML/MIN/1.73M2
GLUCOSE SERPL-MCNC: 205 MG/DL (ref 74–99)
POTASSIUM SERPL-SCNC: 4 MMOL/L (ref 3.7–5.3)
SODIUM SERPL-SCNC: 138 MMOL/L (ref 136–145)
TOTAL PROTEIN, URINE: 111 MG/DL

## 2023-12-07 PROCEDURE — 82570 ASSAY OF URINE CREATININE: CPT

## 2023-12-07 PROCEDURE — 36415 COLL VENOUS BLD VENIPUNCTURE: CPT

## 2023-12-07 PROCEDURE — 82306 VITAMIN D 25 HYDROXY: CPT

## 2023-12-07 PROCEDURE — 84156 ASSAY OF PROTEIN URINE: CPT

## 2023-12-07 PROCEDURE — 80048 BASIC METABOLIC PNL TOTAL CA: CPT

## 2024-01-26 ENCOUNTER — HOSPITAL ENCOUNTER (OUTPATIENT)
Age: 79
Discharge: HOME | End: 2024-01-26
Payer: COMMERCIAL

## 2024-01-26 DIAGNOSIS — N18.31 STAGE 3A CHRONIC KIDNEY DISEASE (HCC): ICD-10-CM

## 2024-01-26 LAB
ANION GAP SERPL CALCULATED.3IONS-SCNC: 16 MMOL/L (ref 9–16)
BUN SERPL-MCNC: 19 MG/DL (ref 8–23)
CALCIUM SERPL-MCNC: 9.6 MG/DL (ref 8.6–10.4)
CHLORIDE SERPL-SCNC: 100 MMOL/L (ref 98–107)
CO2 SERPL-SCNC: 21 MMOL/L (ref 20–31)
CREAT SERPL-MCNC: 1.1 MG/DL (ref 0.5–0.9)
GFR SERPL CREATININE-BSD FRML MDRD: 51 ML/MIN/1.73M2
GLUCOSE SERPL-MCNC: 272 MG/DL (ref 74–99)
POTASSIUM SERPL-SCNC: 4.2 MMOL/L (ref 3.7–5.3)
SODIUM SERPL-SCNC: 137 MMOL/L (ref 136–145)

## 2024-01-26 PROCEDURE — 36415 COLL VENOUS BLD VENIPUNCTURE: CPT

## 2024-01-26 PROCEDURE — 80048 BASIC METABOLIC PNL TOTAL CA: CPT

## 2024-03-20 ENCOUNTER — TELEPHONE (OUTPATIENT)
Dept: INTERNAL MEDICINE | Age: 79
End: 2024-03-20

## 2024-03-20 NOTE — TELEPHONE ENCOUNTER
----- Message from Mikaela Chapaford sent at 3/18/2024 11:38 AM EDT -----  Subject: Appointment Request    Reason for Call: Established Patient Appointment needed: Routine Existing   Condition Follow Up    QUESTIONS    Reason for appointment request? Available appointments did not meet   patient need     Additional Information for Provider? please call patient she cannot do   afternoon appointments , she and do the latest 1130 - 12 please call her   with an appointment and willing to see another PCP if needed , please call   or text , no emails patient computer down   ---------------------------------------------------------------------------  --------------  CALL BACK INFO  [phone]; OK to leave message on voicemail  ---------------------------------------------------------------------------  --------------  SCRIPT ANSWERS

## 2024-04-18 ENCOUNTER — OFFICE VISIT (OUTPATIENT)
Dept: INTERNAL MEDICINE | Age: 79
End: 2024-04-18
Payer: MEDICARE

## 2024-04-18 VITALS
SYSTOLIC BLOOD PRESSURE: 116 MMHG | BODY MASS INDEX: 33.04 KG/M2 | WEIGHT: 175 LBS | OXYGEN SATURATION: 100 % | HEART RATE: 92 BPM | DIASTOLIC BLOOD PRESSURE: 72 MMHG | HEIGHT: 61 IN | TEMPERATURE: 98.1 F

## 2024-04-18 DIAGNOSIS — R21 SKIN RASH: ICD-10-CM

## 2024-04-18 DIAGNOSIS — N18.30 STAGE 3 CHRONIC KIDNEY DISEASE, UNSPECIFIED WHETHER STAGE 3A OR 3B CKD (HCC): ICD-10-CM

## 2024-04-18 DIAGNOSIS — Z12.31 ENCOUNTER FOR SCREENING MAMMOGRAM FOR MALIGNANT NEOPLASM OF BREAST: ICD-10-CM

## 2024-04-18 DIAGNOSIS — E11.21 TYPE 2 DIABETES MELLITUS WITH DIABETIC NEPHROPATHY, WITHOUT LONG-TERM CURRENT USE OF INSULIN (HCC): Primary | Chronic | ICD-10-CM

## 2024-04-18 DIAGNOSIS — F32.0 CURRENT MILD EPISODE OF MAJOR DEPRESSIVE DISORDER WITHOUT PRIOR EPISODE (HCC): ICD-10-CM

## 2024-04-18 DIAGNOSIS — I65.23 BILATERAL CAROTID ARTERY STENOSIS: ICD-10-CM

## 2024-04-18 DIAGNOSIS — I50.32 CHRONIC DIASTOLIC CONGESTIVE HEART FAILURE (HCC): ICD-10-CM

## 2024-04-18 LAB — HBA1C MFR BLD: 7.1 %

## 2024-04-18 PROCEDURE — 1123F ACP DISCUSS/DSCN MKR DOCD: CPT | Performed by: INTERNAL MEDICINE

## 2024-04-18 PROCEDURE — 1036F TOBACCO NON-USER: CPT | Performed by: INTERNAL MEDICINE

## 2024-04-18 PROCEDURE — 3074F SYST BP LT 130 MM HG: CPT | Performed by: INTERNAL MEDICINE

## 2024-04-18 PROCEDURE — G8427 DOCREV CUR MEDS BY ELIG CLIN: HCPCS | Performed by: INTERNAL MEDICINE

## 2024-04-18 PROCEDURE — G8399 PT W/DXA RESULTS DOCUMENT: HCPCS | Performed by: INTERNAL MEDICINE

## 2024-04-18 PROCEDURE — 99214 OFFICE O/P EST MOD 30 MIN: CPT | Performed by: INTERNAL MEDICINE

## 2024-04-18 PROCEDURE — 3078F DIAST BP <80 MM HG: CPT | Performed by: INTERNAL MEDICINE

## 2024-04-18 PROCEDURE — 83036 HEMOGLOBIN GLYCOSYLATED A1C: CPT | Performed by: INTERNAL MEDICINE

## 2024-04-18 PROCEDURE — 99212 OFFICE O/P EST SF 10 MIN: CPT | Performed by: INTERNAL MEDICINE

## 2024-04-18 PROCEDURE — G8417 CALC BMI ABV UP PARAM F/U: HCPCS | Performed by: INTERNAL MEDICINE

## 2024-04-18 PROCEDURE — 1090F PRES/ABSN URINE INCON ASSESS: CPT | Performed by: INTERNAL MEDICINE

## 2024-04-18 RX ORDER — DAPAGLIFLOZIN 5 MG/1
5 TABLET, FILM COATED ORAL EVERY MORNING
Qty: 90 TABLET | Refills: 0 | Status: SHIPPED | OUTPATIENT
Start: 2024-04-18

## 2024-04-18 RX ORDER — CITALOPRAM 40 MG/1
40 TABLET ORAL DAILY
COMMUNITY
Start: 2024-03-05

## 2024-04-18 RX ORDER — CLOTRIMAZOLE 1 %
CREAM (GRAM) TOPICAL
Qty: 60 G | Refills: 1 | Status: SHIPPED | OUTPATIENT
Start: 2024-04-18 | End: 2024-04-25

## 2024-04-18 SDOH — ECONOMIC STABILITY: FOOD INSECURITY: WITHIN THE PAST 12 MONTHS, THE FOOD YOU BOUGHT JUST DIDN'T LAST AND YOU DIDN'T HAVE MONEY TO GET MORE.: NEVER TRUE

## 2024-04-18 SDOH — ECONOMIC STABILITY: HOUSING INSECURITY
IN THE LAST 12 MONTHS, WAS THERE A TIME WHEN YOU DID NOT HAVE A STEADY PLACE TO SLEEP OR SLEPT IN A SHELTER (INCLUDING NOW)?: NO

## 2024-04-18 SDOH — ECONOMIC STABILITY: FOOD INSECURITY: WITHIN THE PAST 12 MONTHS, YOU WORRIED THAT YOUR FOOD WOULD RUN OUT BEFORE YOU GOT MONEY TO BUY MORE.: NEVER TRUE

## 2024-04-18 SDOH — ECONOMIC STABILITY: INCOME INSECURITY: HOW HARD IS IT FOR YOU TO PAY FOR THE VERY BASICS LIKE FOOD, HOUSING, MEDICAL CARE, AND HEATING?: NOT HARD AT ALL

## 2024-04-18 ASSESSMENT — PATIENT HEALTH QUESTIONNAIRE - PHQ9
7. TROUBLE CONCENTRATING ON THINGS, SUCH AS READING THE NEWSPAPER OR WATCHING TELEVISION: NOT AT ALL
2. FEELING DOWN, DEPRESSED OR HOPELESS: NOT AT ALL
9. THOUGHTS THAT YOU WOULD BE BETTER OFF DEAD, OR OF HURTING YOURSELF: NOT AT ALL
10. IF YOU CHECKED OFF ANY PROBLEMS, HOW DIFFICULT HAVE THESE PROBLEMS MADE IT FOR YOU TO DO YOUR WORK, TAKE CARE OF THINGS AT HOME, OR GET ALONG WITH OTHER PEOPLE: NOT DIFFICULT AT ALL
SUM OF ALL RESPONSES TO PHQ QUESTIONS 1-9: 0
SUM OF ALL RESPONSES TO PHQ QUESTIONS 1-9: 0
1. LITTLE INTEREST OR PLEASURE IN DOING THINGS: NOT AT ALL
6. FEELING BAD ABOUT YOURSELF - OR THAT YOU ARE A FAILURE OR HAVE LET YOURSELF OR YOUR FAMILY DOWN: NOT AT ALL
SUM OF ALL RESPONSES TO PHQ QUESTIONS 1-9: 0
3. TROUBLE FALLING OR STAYING ASLEEP: NOT AT ALL
8. MOVING OR SPEAKING SO SLOWLY THAT OTHER PEOPLE COULD HAVE NOTICED. OR THE OPPOSITE, BEING SO FIGETY OR RESTLESS THAT YOU HAVE BEEN MOVING AROUND A LOT MORE THAN USUAL: NOT AT ALL
5. POOR APPETITE OR OVEREATING: NOT AT ALL
SUM OF ALL RESPONSES TO PHQ9 QUESTIONS 1 & 2: 0
4. FEELING TIRED OR HAVING LITTLE ENERGY: NOT AT ALL
SUM OF ALL RESPONSES TO PHQ QUESTIONS 1-9: 0

## 2024-04-18 ASSESSMENT — ENCOUNTER SYMPTOMS
GASTROINTESTINAL NEGATIVE: 1
ALLERGIC/IMMUNOLOGIC NEGATIVE: 1
EYES NEGATIVE: 1

## 2024-04-18 NOTE — PROGRESS NOTES
[employer])  01/01/2024       ASSESSMENT AND PLAN:       Diagnosis Orders   1. Type 2 diabetes mellitus with diabetic nephropathy, without long-term current use of insulin (HCC)  POCT glycosylated hemoglobin (Hb A1C)    Lipid Panel    FARXIGA 5 MG tablet    Basic Metabolic Panel      2. Encounter for screening mammogram for malignant neoplasm of breast  [facility] DIGITAL SCREEN W OR WO CAD BILATERAL      3. Chronic diastolic congestive heart failure (HCC)  FARXIGA 5 MG tablet      4. Current mild episode of major depressive disorder without prior episode (HCC)        5. Bilateral carotid artery stenosis  Chris Groves MD, Vascular Surgery, [facility]      6. Skin rash  clotrimazole (LOTRIMIN AF) 1 % cream      7. Stage 3 chronic kidney disease, unspecified whether stage 3a or 3b CKD (HCC)  FARXIGA 5 MG tablet      Referred [facility] at [facility] due to insurance change and her current specialist not in network per patient.  Started Farxiga per Nephro recommendations  Stop amaryl  BMP in 2 weeks      FOLLOW UP:   1.  La received counseling on the following healthy behaviors: nutrition and exercise    2.  Reviewed prior labs and health maintenance.      3.  Discussed use, benefit, and side effects of prescribed medications.  Barriers to medication compliance addressed.  All patient questions answered.  Pt voiced understanding.     4.  Continue current medications, diet and exercise.              Orders Placed This Encounter   Medications    clotrimazole (LOTRIMIN AF) 1 % cream     Sig: Apply topically 2 times daily.     Dispense:  60 g     Refill:  1    FARXIGA 5 MG tablet     Sig: Take 1 tablet by mouth every morning     Dispense:  90 tablet     Refill:  0     If less expensive, fill 30-days at a time for pt please          Completed Refills               Requested Prescriptions     Signed Prescriptions Disp Refills    clotrimazole (LOTRIMIN AF) 1 % cream 60 g 1     Sig: Apply topically 2 times daily.

## 2024-04-22 ENCOUNTER — TELEPHONE (OUTPATIENT)
Dept: INTERNAL MEDICINE | Age: 79
End: 2024-04-22

## 2024-04-22 NOTE — TELEPHONE ENCOUNTER
PCP wants pt to start Farxiga, likely co-pay is cost prohibitive for pt. [place] application completed at office visit and faxed in. Writer received notification that pt has been approved through 12/31/24.    PC to pt to let her know medication should be mailed directly to her, requested she contact writer in 7-10 days if she has not received anything.

## 2024-05-07 ENCOUNTER — TELEPHONE (OUTPATIENT)
Dept: INTERNAL MEDICINE | Age: 79
End: 2024-05-07

## 2024-05-07 RX ORDER — CITALOPRAM 40 MG/1
40 TABLET ORAL DAILY
Qty: 30 TABLET | Refills: 1 | Status: SHIPPED | OUTPATIENT
Start: 2024-05-07

## 2024-05-07 NOTE — TELEPHONE ENCOUNTER
----- Message from Chrystal Bhavesh sent at 5/7/2024 12:03 PM EDT -----  Subject: Appointment Request    Reason for Call: Established Patient Appointment needed: Routine Existing   Condition Follow Up    QUESTIONS    Reason for appointment request? No appointments available during search     Additional Information for Provider?   ---------------------------------------------------------------------------  --------------  CALL BACK INFO  [phone]; OK to leave message on voicemail  ---------------------------------------------------------------------------  --------------  SCRIPT ANSWERS

## 2024-05-07 NOTE — TELEPHONE ENCOUNTER
RADIOFREQUENCY L/S ADDITIONAL Once 05/17/2023   Basic Metabolic Panel Once 07/01/2024   Creatinine, Random Urine Once 07/01/2024   Protein, urine, random Once 07/01/2024   Basic Metabolic Panel Once 01/06/2025   PTH, Intact with Ionized Calcium Once 01/06/2025   Phosphorus Once 01/06/2025   Vitamin D 25 Hydroxy Once 01/06/2025   Creatinine, Random Urine Once 01/06/2025   Protein, urine, random Once 01/06/2025   Lipid Panel Once 04/18/2024   HECTOR DIGITAL SCREEN W OR WO CAD BILATERAL Once 04/18/2024   Basic Metabolic Panel Once 05/02/2024               Patient Active Problem List:     S/P total knee arthroplasty     Type 2 diabetes mellitus with diabetic nephropathy, without long-term current use of insulin (Formerly [facility])     Hypertension     Asthma     Arthritis     Acquired trigger finger     Osteoarthritis of knee     Morbid obesity with BMI of 40.0-44.9, adult (Formerly [facility])     Current mild episode of major depressive disorder without prior episode ([facility])     Bilateral carotid artery stenosis     Other specified glaucoma     Chronic systolic (congestive) heart failure     Lumbar radiculopathy     Lumbar degenerative disc disease     Lumbar spondylosis     Chronic renal disease, stage III ([facility]) [479845]     Gastrointestinal hemorrhage     Lumbosacral spondylosis without myelopathy     Spondylosis of cervical region without myelopathy or radiculopathy

## 2024-05-07 NOTE — TELEPHONE ENCOUNTER
Spoke with the patient regarding appointment request for her PCP. Patient stated she needed a July appointment for her follow up. I did let the patient know that July had not opened just yet for the doctor     Electronically signed by Libra Le on 5/7/2024 at 2:51 PM

## 2024-05-31 NOTE — TELEPHONE ENCOUNTER
La Collado is calling to request a refill on the following medication(s):    Medication Request:  Requested Prescriptions     Pending Prescriptions Disp Refills    simvastatin (ZOCOR) 40 MG tablet [Pharmacy Med Name: SIMVASTATIN 40 MG TABLET] 30 tablet      Sig: TAKE ONE TABLET BY MOUTH ONCE NIGHTLY       Last Visit Date (If Applicable):  4/18/2024    Next Visit Date:    Visit date not found

## 2024-06-03 NOTE — TELEPHONE ENCOUNTER
Dr. Jeri Parkinson MD,     Outreach regarding medication adherence:   Patient reports pharmacy told her that metformin was discontinued so she has not taken in about 2 weeks. At last visit with you, appears metformin was only decreased from twice daily to once daily rather than being stopped. Do you want patient to be on metformin? If so, a new Rx will need sent to pharmacy.      Last visit: 04/18/2024, Next visit: 06/18/2024    See encounter note(s) below for complete details. Please let me know if you have any questions.      Thank you,  Farrah Velasquez, PharmD, BCACP, BCGP  Population Health Pharmacist   [facility] Clinical Pharmacy  Department, toll free: [phone], option 1    =======================================================    [facility] CLINICAL PHARMACY REVIEW: ADHERENCE  Identified care gap per [employer] fills at [facility]: ACE/ARB and Diabetes adherence    Patient also appears to be prescribed: Statin    ASSESSMENT  ACE/ARB ADHERENCE    Insurance Records claims through 06/03/24 (Prior Year PDC = not reported; YTD PDC = 41% - FAILED):   Valsartan-hydrochlorthiazide 160-12.5mg daily last filled on 02/16/2024 for 30 day supply. Next refill due: 03/17/2024    BP Readings from Last 3 Encounters:   04/18/24 116/72   01/26/24 132/72   10/26/23 (!) 154/80     Lab Results   Component Value Date/Time    LABGLOM 51 01/26/2024 11:53 AM     Lab Results   Component Value Date    CREATININE 1.1 (H) 01/26/2024     Estimated Creatinine Clearance: 40 mL/min (A) (based on SCr of 1.1 mg/dL (H)).    Lab Results   Component Value Date    K 4.2 01/26/2024      DIABETES ADHERENCE    Insurance Records claims through 06/03/24 (Prior Year PDC = not reported; YTD PDC = 56%; Potential Fail Date: 06/08/2024):   metformin 1000mg BID last filled on 02/16/2024 for 30 day supply. Next refill due: 03/17/2024  glimepiride 2mg BID last filled on 03/09/2024 for 30 day supply. Next refill due: 04/08/2024    Appears metformin

## 2024-06-04 RX ORDER — SIMVASTATIN 40 MG
40 TABLET ORAL NIGHTLY
Qty: 30 TABLET | Refills: 1 | Status: SHIPPED | OUTPATIENT
Start: 2024-06-04

## 2024-06-06 NOTE — TELEPHONE ENCOUNTER
Dr. Jeri Parkinson MD,     Appropriate for patient to be on metformin 1000mg daily based on renal function. Order pended for your convenience.     It is recommended for patient's renal function to be monitored every 3-6 months, which you are already doing for patient.      Last visit: 04/18/2024    See encounter note(s) below for complete details. Please let me know if you have any questions.      Thank you,  Farrah Velasquez, PharmD, BCACP, BCGP  Population Health Pharmacist   [facility] Clinical Pharmacy  Department, toll free: [phone], option 1    =======================================================    [facility] CLINICAL PHARMACY REVIEW: ADHERENCE    Labs:    Latest Reference Range & Units 05/31/23 11:10 08/08/23 11:19 12/07/23 10:43 01/26/24 11:53   Creatinine 0.50 - 0.90 mg/dL 1.30 (H) 0.9 1.0 (H) 1.1 (H)   Est, Glom Filt Rate >60 mL/min/1.73m2 42 (L) >60 58 (L) 51 (L)   (H): Data is abnormally high  (L): Data is abnormally low  Estimated Creatinine Clearance: 40 mL/min (A) (based on SCr of 1.1 mg/dL (H)).    Per [employer]:   Altered kidney function:  eGFR ?60 mL/minute/1.73 m2: No dosage adjustment necessary. Monitor renal function at least annually.  eGFR >45 to <60 mL/minute/1.73 m2: No dosage adjustment necessary. Metformin plasma concentrations may be higher compared to patients with an eGFR ?60 mL/minute/1.73 m2; increase monitoring of renal function (eg, every 3 to 6 months) (Ref).  eGFR 30 to 45 mL/minute/1.73 m2:  Initiation of therapy: Use generally not recommended (Ref); however, initial therapy with 500 mg once daily with the evening meal titrated to 500 mg twice daily, if tolerated, with close monitoring of kidney function has been recommended by some experts (Ref).  Continuation of existing therapy: May continue at a reduced dose up to a maximum of 500 mg twice daily with close monitoring of kidney function (Ref).  eGFR <30 mL/minute/1.73 m2: Use is

## 2024-07-16 NOTE — TELEPHONE ENCOUNTER
Request for   Requested Prescriptions     Pending Prescriptions Disp Refills    citalopram (CELEXA) 40 MG tablet [Pharmacy Med Name: CITALOPRAM HBR 40 MG TABLET] 30 tablet 1     Sig: TAKE 1 TABLET BY MOUTH DAILY    .      Please review and e-scribe to pharmacy listed in chart if appropriate. Thank you.      Last Visit Date: 4/18/2024  Next Visit Date: 7/24/2024    Future Appointments   Date Time Provider Department Center   7/24/2024  1:20 PM Jeri Parkinson MD [facility]   10/15/2024 11:30 AM Allan Roberts MD Resp Spec [facility]       Health Maintenance   Topic Date Due    Respiratory Syncytial Virus (RSV) Pregnant or age 60 yrs+ (1 - 1-dose 60+ series) Never done    COVID-19 Vaccine (3 - 2023-24 season) 09/01/2023    Lipids  12/03/2023    Annual Wellness Visit (Medicare Advantage)  01/01/2024    DTaP/Tdap/Td vaccine (1 - Tdap) 10/25/2024 (Originally 7/3/1964)    Shingles vaccine (1 of 2) 10/25/2024 (Originally 7/3/1995)    Flu vaccine (1) 08/01/2024    Depression Monitoring  04/18/2025    DEXA (modify frequency per FRAX score)  Completed    Pneumococcal 65+ years Vaccine  Completed    Hepatitis C screen  Completed    Hepatitis A vaccine  Aged Out    Hepatitis B vaccine  Aged Out    Hib vaccine  Aged Out    Polio vaccine  Aged Out    Meningococcal (ACWY) vaccine  Aged Out    Diabetic foot exam  Discontinued    A1C test (Diabetic or Prediabetic)  Discontinued    Diabetic retinal exam  Discontinued    Colonoscopy  Discontinued       Hemoglobin A1C (%)   Date Value   04/18/2024 7.1   10/26/2023 7.4   01/31/2023 6.5             ( goal A1C is < 7)   No components found for: \"LABMICR\"  No components found for: \"LDLCHOLESTEROL\", \"LDLCALC\"    (goal LDL is <100)   AST (U/L)   Date Value   02/05/2023 32 (H)     ALT (U/L)   Date Value   02/05/2023 13     BUN (mg/dL)   Date Value   01/26/2024 19     BP Readings from Last 3 Encounters:   04/18/24 116/72   01/26/24 132/72   10/26/23 (!) 154/80          (goal 120/80)    All

## 2024-07-17 RX ORDER — CITALOPRAM 40 MG/1
40 TABLET ORAL DAILY
Qty: 30 TABLET | Refills: 1 | Status: SHIPPED | OUTPATIENT
Start: 2024-07-17

## 2024-07-18 ENCOUNTER — HOSPITAL ENCOUNTER (OUTPATIENT)
Age: 79
Discharge: HOME | End: 2024-07-18
Payer: MEDICARE

## 2024-07-18 DIAGNOSIS — E11.21 TYPE 2 DIABETES MELLITUS WITH DIABETIC NEPHROPATHY, WITHOUT LONG-TERM CURRENT USE OF INSULIN (HCC): Chronic | ICD-10-CM

## 2024-07-18 LAB
ANION GAP SERPL CALCULATED.3IONS-SCNC: 14 MMOL/L (ref 9–16)
ANION GAP SERPL CALCULATED.3IONS-SCNC: 15 MMOL/L (ref 9–16)
BUN SERPL-MCNC: 16 MG/DL (ref 8–23)
BUN SERPL-MCNC: 17 MG/DL (ref 8–23)
CALCIUM SERPL-MCNC: 9.7 MG/DL (ref 8.6–10.4)
CALCIUM SERPL-MCNC: 9.9 MG/DL (ref 8.6–10.4)
CHLORIDE SERPL-SCNC: 103 MMOL/L (ref 98–107)
CHLORIDE SERPL-SCNC: 104 MMOL/L (ref 98–107)
CHOLEST SERPL-MCNC: 139 MG/DL (ref 0–199)
CHOLESTEROL/HDL RATIO: 2
CO2 SERPL-SCNC: 21 MMOL/L (ref 20–31)
CO2 SERPL-SCNC: 22 MMOL/L (ref 20–31)
CREAT SERPL-MCNC: 1 MG/DL (ref 0.5–0.9)
CREAT SERPL-MCNC: 1.1 MG/DL (ref 0.5–0.9)
CREAT UR-MCNC: 185 MG/DL (ref 28–217)
GFR, ESTIMATED: 52 ML/MIN/1.73M2
GFR, ESTIMATED: 55 ML/MIN/1.73M2
GLUCOSE SERPL-MCNC: 172 MG/DL (ref 74–99)
GLUCOSE SERPL-MCNC: 176 MG/DL (ref 74–99)
HDLC SERPL-MCNC: 70 MG/DL
LDLC SERPL CALC-MCNC: 50 MG/DL (ref 0–100)
POTASSIUM SERPL-SCNC: 4.1 MMOL/L (ref 3.7–5.3)
POTASSIUM SERPL-SCNC: 4.2 MMOL/L (ref 3.7–5.3)
SODIUM SERPL-SCNC: 139 MMOL/L (ref 136–145)
SODIUM SERPL-SCNC: 140 MMOL/L (ref 136–145)
TOTAL PROTEIN, URINE: 134 MG/DL
TRIGL SERPL-MCNC: 96 MG/DL
VLDLC SERPL CALC-MCNC: 19 MG/DL

## 2024-07-18 PROCEDURE — 80061 LIPID PANEL: CPT

## 2024-07-18 PROCEDURE — 82570 ASSAY OF URINE CREATININE: CPT

## 2024-07-18 PROCEDURE — 80048 BASIC METABOLIC PNL TOTAL CA: CPT

## 2024-07-18 PROCEDURE — 36415 COLL VENOUS BLD VENIPUNCTURE: CPT

## 2024-07-18 PROCEDURE — 84156 ASSAY OF PROTEIN URINE: CPT

## 2024-07-24 ENCOUNTER — OFFICE VISIT (OUTPATIENT)
Dept: INTERNAL MEDICINE | Age: 79
End: 2024-07-24
Payer: MEDICARE

## 2024-07-24 VITALS
DIASTOLIC BLOOD PRESSURE: 70 MMHG | SYSTOLIC BLOOD PRESSURE: 120 MMHG | HEIGHT: 61 IN | TEMPERATURE: 97.7 F | BODY MASS INDEX: 32.55 KG/M2 | HEART RATE: 80 BPM | WEIGHT: 172.4 LBS | OXYGEN SATURATION: 98 %

## 2024-07-24 DIAGNOSIS — Z00.00 MEDICARE ANNUAL WELLNESS VISIT, SUBSEQUENT: Primary | ICD-10-CM

## 2024-07-24 DIAGNOSIS — F32.0 CURRENT MILD EPISODE OF MAJOR DEPRESSIVE DISORDER WITHOUT PRIOR EPISODE (HCC): ICD-10-CM

## 2024-07-24 DIAGNOSIS — I65.23 BILATERAL CAROTID ARTERY STENOSIS: ICD-10-CM

## 2024-07-24 DIAGNOSIS — N18.30 STAGE 3 CHRONIC KIDNEY DISEASE, UNSPECIFIED WHETHER STAGE 3A OR 3B CKD (HCC): ICD-10-CM

## 2024-07-24 DIAGNOSIS — E11.21 TYPE 2 DIABETES MELLITUS WITH DIABETIC NEPHROPATHY, WITHOUT LONG-TERM CURRENT USE OF INSULIN (HCC): ICD-10-CM

## 2024-07-24 DIAGNOSIS — I10 PRIMARY HYPERTENSION: Chronic | ICD-10-CM

## 2024-07-24 PROCEDURE — 3074F SYST BP LT 130 MM HG: CPT | Performed by: INTERNAL MEDICINE

## 2024-07-24 PROCEDURE — G0439 PPPS, SUBSEQ VISIT: HCPCS | Performed by: INTERNAL MEDICINE

## 2024-07-24 PROCEDURE — 1123F ACP DISCUSS/DSCN MKR DOCD: CPT | Performed by: INTERNAL MEDICINE

## 2024-07-24 PROCEDURE — 99213 OFFICE O/P EST LOW 20 MIN: CPT | Performed by: INTERNAL MEDICINE

## 2024-07-24 PROCEDURE — 3051F HG A1C>EQUAL 7.0%<8.0%: CPT | Performed by: INTERNAL MEDICINE

## 2024-07-24 PROCEDURE — 3078F DIAST BP <80 MM HG: CPT | Performed by: INTERNAL MEDICINE

## 2024-07-24 RX ORDER — VALSARTAN 160 MG/1
160 TABLET ORAL DAILY
Qty: 30 TABLET | Refills: 3 | Status: SHIPPED | OUTPATIENT
Start: 2024-07-24

## 2024-07-24 NOTE — PROGRESS NOTES
MOUTH DAILY Yes Jeri Parkinson MD   metFORMIN (GLUCOPHAGE) 1000 MG tablet Take 1 tablet by mouth daily (with breakfast) Yes Jeri Parkinson MD   simvastatin (ZOCOR) 40 MG tablet TAKE ONE TABLET BY MOUTH ONCE NIGHTLY Yes Jeri Parkinson MD   FARXIGA 5 MG tablet Take 1 tablet by mouth every morning Yes Jeri Parkinson MD   sodium bicarbonate 325 MG tablet Take 2 tablets by mouth once Yes Marianna Baca MD   valsartan-hydroCHLOROthiazide (DIOVAN-HCT) 160-12.5 MG per tablet Take 1 tablet by mouth daily Yes Jeri Parkinson MD   fluticasone (FLOVENT HFA) 110 MCG/ACT inhaler Inhale 2 puffs into the lungs 2 times daily Yes Allan Roberts MD   fluticasone (FLONASE) 50 MCG/ACT nasal spray 2 sprays by Nasal route daily Yes Allan Roberts MD   Latanoprost 0.005 % EMUL latanoprost 0.005 % eye drops Yes Marianna Baca MD   albuterol sulfate  (90 Base) MCG/ACT inhaler Inhale 2 puffs into the lungs every 6 hours as needed for Wheezing Yes Allan Roberts MD   acetaminophen (TYLENOL) 500 MG tablet Take 1 tablet by mouth Yes Marianna Baca MD   aspirin 81 MG tablet Take 1 tablet by mouth daily Yes Marianna Baca MD   vitamin D (CHOLECALCIFEROL) [id number] UNIT CAPS Take 1 capsule by mouth once a week for 16 doses  Seun Pink MD      Diagnosis Orders   1. Medicare annual wellness visit, subsequent        2. Type 2 diabetes mellitus with diabetic nephropathy, without long-term current use of insulin ([facility])        3. Bilateral carotid artery stenosis        4. Stage 3 chronic kidney disease, unspecified whether stage 3a or 3b CKD ([facility])        5. Current mild episode of major depressive disorder without prior episode ([facility])        6. Primary hypertension  valsartan (DIOVAN) 160 MG tablet        Given patient's complaints of increased urination will switch her combination medication of valsartan with HCTZ to just valsartan.  Continue Farxiga at this time  Follow-up in 1 month for blood pressure and symptom

## 2024-08-05 NOTE — TELEPHONE ENCOUNTER
Last visit: 7/24/24  Last Med refill: 6/4/24  Does patient have enough medication for 72 hours: No:     Next Visit Date: 8/20/24  Future Appointments   Date Time Provider Department Center   8/20/2024  2:00 PM Jeri Parkinson MD [facility]   10/15/2024 11:30 AM Allan Roberts MD Resp Spec [email or web address]       Health Maintenance   Topic Date Due    Respiratory Syncytial Virus (RSV) Pregnant or age 60 yrs+ (1 - 1-dose 60+ series) Never done    COVID-19 Vaccine (3 - 2023-24 season) 09/01/2023    Flu vaccine (1) 08/01/2024    DTaP/Tdap/Td vaccine (1 - Tdap) 10/25/2024 (Originally 7/3/1964)    Shingles vaccine (1 of 2) 10/25/2024 (Originally 7/3/1995)    Lipids  07/18/2025    Depression Monitoring  07/24/2025    DEXA (modify frequency per FRAX score)  Completed    Annual Wellness Visit (Medicare Advantage)  Completed    Pneumococcal 65+ years Vaccine  Completed    Hepatitis C screen  Completed    Hepatitis A vaccine  Aged Out    Hepatitis B vaccine  Aged Out    Hib vaccine  Aged Out    Polio vaccine  Aged Out    Meningococcal (ACWY) vaccine  Aged Out    Diabetic foot exam  Discontinued    A1C test (Diabetic or Prediabetic)  Discontinued    Diabetic retinal exam  Discontinued    Colonoscopy  Discontinued       Hemoglobin A1C (%)   Date Value   04/18/2024 7.1   10/26/2023 7.4   01/31/2023 6.5             ( goal A1C is < 7)   No components found for: \"LABMICR\"  No components found for: \"LDLCHOLESTEROL\", \"LDLCALC\"    (goal LDL is <100)   AST (U/L)   Date Value   02/05/2023 32 (H)     ALT (U/L)   Date Value   02/05/2023 13     BUN (mg/dL)   Date Value   07/18/2024 16     BP Readings from Last 3 Encounters:   07/24/24 120/70   04/18/24 116/72   01/26/24 132/72          (goal 120/80)    All Future Testing planned in [facility]  Lab Frequency Next Occurrence   Basic Metabolic Panel Once 01/06/2025   PTH, Intact with Ionized Calcium Once 01/06/2025   Phosphorus Once 01/06/2025   Vitamin D 25 Hydroxy Once 01/06/2025   Creatinine,

## 2024-08-06 RX ORDER — SIMVASTATIN 40 MG
40 TABLET ORAL NIGHTLY
Qty: 30 TABLET | Refills: 1 | Status: SHIPPED | OUTPATIENT
Start: 2024-08-06

## 2024-09-30 NOTE — TELEPHONE ENCOUNTER
Last visit: 7/24/24  Last Med refill: 7/17/24  Does patient have enough medication for 72 hours: No:     Next Visit Date: Patient is on the waitlist for an appt in Janary 2025.  Future Appointments   Date Time Provider Department Center   10/15/2024 11:30 AM Allan Roberts MD Resp Spec [email or web address]       Health Maintenance   Topic Date Due    Respiratory Syncytial Virus (RSV) Pregnant or age 60 yrs+ (1 - 1-dose 60+ series) Never done    Flu vaccine (1) 08/01/2024    COVID-19 Vaccine (3 - 2023-24 season) 09/01/2024    DTaP/Tdap/Td vaccine (1 - Tdap) 10/25/2024 (Originally 7/3/1964)    Shingles vaccine (1 of 2) 10/25/2024 (Originally 7/3/1995)    Lipids  07/18/2025    Depression Monitoring  07/24/2025    DEXA (modify frequency per FRAX score)  Completed    Annual Wellness Visit (Medicare Advantage)  Completed    Pneumococcal 65+ years Vaccine  Completed    Hepatitis C screen  Completed    Hepatitis A vaccine  Aged Out    Hepatitis B vaccine  Aged Out    Hib vaccine  Aged Out    Polio vaccine  Aged Out    Meningococcal (ACWY) vaccine  Aged Out    Diabetic foot exam  Discontinued    A1C test (Diabetic or Prediabetic)  Discontinued    Diabetic retinal exam  Discontinued    Colonoscopy  Discontinued       Hemoglobin A1C (%)   Date Value   04/18/2024 7.1   10/26/2023 7.4   01/31/2023 6.5             ( goal A1C is < 7)   No components found for: \"LABMICR\"  No components found for: \"LDLCHOLESTEROL\", \"LDLCALC\"    (goal LDL is <100)   AST (U/L)   Date Value   02/05/2023 32 (H)     ALT (U/L)   Date Value   02/05/2023 13     BUN (mg/dL)   Date Value   07/18/2024 16     BP Readings from Last 3 Encounters:   07/24/24 120/70   04/18/24 116/72   01/26/24 132/72          (goal 120/80)    All Future Testing planned in [facility]  Lab Frequency Next Occurrence   Basic Metabolic Panel Once 01/06/2025   PTH, Intact with Ionized Calcium Once 01/06/2025   Phosphorus Once 01/06/2025   Vitamin D 25 Hydroxy Once 01/06/2025   Creatinine, Random

## 2024-10-01 RX ORDER — SIMVASTATIN 40 MG
40 TABLET ORAL NIGHTLY
Qty: 30 TABLET | Refills: 1 | Status: SHIPPED | OUTPATIENT
Start: 2024-10-01

## 2024-10-01 RX ORDER — CITALOPRAM HYDROBROMIDE 40 MG/1
40 TABLET ORAL DAILY
Qty: 30 TABLET | Refills: 1 | Status: SHIPPED | OUTPATIENT
Start: 2024-10-01

## 2024-10-11 DIAGNOSIS — I50.32 CHRONIC DIASTOLIC CONGESTIVE HEART FAILURE (HCC): ICD-10-CM

## 2024-10-11 DIAGNOSIS — N18.30 STAGE 3 CHRONIC KIDNEY DISEASE, UNSPECIFIED WHETHER STAGE 3A OR 3B CKD (HCC): ICD-10-CM

## 2024-10-11 DIAGNOSIS — E11.21 TYPE 2 DIABETES MELLITUS WITH DIABETIC NEPHROPATHY, WITHOUT LONG-TERM CURRENT USE OF INSULIN (HCC): Chronic | ICD-10-CM

## 2024-10-11 RX ORDER — DAPAGLIFLOZIN 5 MG/1
5 TABLET, FILM COATED ORAL EVERY MORNING
Qty: 90 TABLET | Refills: 1 | Status: SHIPPED | OUTPATIENT
Start: 2024-10-11

## 2024-10-11 NOTE — TELEPHONE ENCOUNTER
Writer received notification that pt needs a new script for Farxiga for the patient assistance program through [employer].    Script pended.

## 2024-10-15 ENCOUNTER — OFFICE VISIT (OUTPATIENT)
Dept: PULMONOLOGY | Age: 79
End: 2024-10-15
Payer: MEDICARE

## 2024-10-15 VITALS
SYSTOLIC BLOOD PRESSURE: 119 MMHG | RESPIRATION RATE: 17 BRPM | HEART RATE: 89 BPM | DIASTOLIC BLOOD PRESSURE: 82 MMHG | OXYGEN SATURATION: 96 % | WEIGHT: 176 LBS | HEIGHT: 61 IN | BODY MASS INDEX: 33.23 KG/M2

## 2024-10-15 DIAGNOSIS — J45.30 MILD PERSISTENT ASTHMA WITHOUT COMPLICATION: Primary | ICD-10-CM

## 2024-10-15 DIAGNOSIS — G47.33 OBSTRUCTIVE SLEEP APNEA: ICD-10-CM

## 2024-10-15 DIAGNOSIS — J30.9 ALLERGIC RHINITIS, UNSPECIFIED SEASONALITY, UNSPECIFIED TRIGGER: ICD-10-CM

## 2024-10-15 DIAGNOSIS — R06.09 DYSPNEA ON EXERTION: ICD-10-CM

## 2024-10-15 PROCEDURE — 99214 OFFICE O/P EST MOD 30 MIN: CPT | Performed by: INTERNAL MEDICINE

## 2024-10-15 PROCEDURE — G0008 ADMIN INFLUENZA VIRUS VAC: HCPCS | Performed by: INTERNAL MEDICINE

## 2024-10-15 PROCEDURE — 3074F SYST BP LT 130 MM HG: CPT | Performed by: INTERNAL MEDICINE

## 2024-10-15 PROCEDURE — 3079F DIAST BP 80-89 MM HG: CPT | Performed by: INTERNAL MEDICINE

## 2024-10-15 PROCEDURE — 90653 IIV ADJUVANT VACCINE IM: CPT | Performed by: INTERNAL MEDICINE

## 2024-10-15 PROCEDURE — 1123F ACP DISCUSS/DSCN MKR DOCD: CPT | Performed by: INTERNAL MEDICINE

## 2024-10-15 RX ORDER — ALBUTEROL SULFATE 90 UG/1
2 INHALANT RESPIRATORY (INHALATION) EVERY 6 HOURS PRN
Qty: 1 EACH | Refills: 3 | Status: SHIPPED | OUTPATIENT
Start: 2024-10-15

## 2024-10-15 RX ORDER — FLUTICASONE PROPIONATE 110 UG/1
2 AEROSOL, METERED RESPIRATORY (INHALATION) 2 TIMES DAILY
Qty: 1 EACH | Refills: 11 | Status: SHIPPED | OUTPATIENT
Start: 2024-10-15

## 2024-10-15 ASSESSMENT — SLEEP AND FATIGUE QUESTIONNAIRES
HOW LIKELY ARE YOU TO NOD OFF OR FALL ASLEEP WHILE SITTING AND READING: WOULD NEVER DOZE
ESS TOTAL SCORE: 3
HOW LIKELY ARE YOU TO NOD OFF OR FALL ASLEEP IN A CAR, WHILE STOPPED FOR A FEW MINUTES IN TRAFFIC: WOULD NEVER DOZE
HOW LIKELY ARE YOU TO NOD OFF OR FALL ASLEEP WHILE WATCHING TV: WOULD NEVER DOZE
HOW LIKELY ARE YOU TO NOD OFF OR FALL ASLEEP WHILE SITTING INACTIVE IN A PUBLIC PLACE: WOULD NEVER DOZE
HOW LIKELY ARE YOU TO NOD OFF OR FALL ASLEEP WHEN YOU ARE A PASSENGER IN A CAR FOR AN HOUR WITHOUT A BREAK: WOULD NEVER DOZE
HOW LIKELY ARE YOU TO NOD OFF OR FALL ASLEEP WHILE SITTING QUIETLY AFTER LUNCH WITHOUT ALCOHOL: WOULD NEVER DOZE
HOW LIKELY ARE YOU TO NOD OFF OR FALL ASLEEP WHILE SITTING AND TALKING TO SOMEONE: WOULD NEVER DOZE
HOW LIKELY ARE YOU TO NOD OFF OR FALL ASLEEP WHILE LYING DOWN TO REST IN THE AFTERNOON WHEN CIRCUMSTANCES PERMIT: HIGH CHANCE OF DOZING

## 2024-10-15 NOTE — PROGRESS NOTES
After obtaining consent, and per orders of Dr. Roberts, injection of Fluad 65+ given in Left deltoid by Pat Guerrier MA. Patient instructed to remain in clinic for 20 minutes afterwards, and to report any adverse reaction to me immediately.    
(age 6 mo+) and AFLURIA, (age 3 y+), Quadv PF, 0.5mL 12/13/2022    Pneumococcal, PCV-13, PREVNAR 13, (age 6w+), IM, 0.5mL 10/26/2016    Pneumococcal, PPSV23, PNEUMOVAX 23, (age 2y+), SC/IM, 0.5mL 10/08/2008, 03/27/2017    Tetanus Toxoid, absorbed 11/11/2004       Assessment:      1. Obstructive sleep apnea   2. Mild persistent asthma without complication   3. Dyspnea on exertion   4. Allergic rhinitis, unspecified seasonality, unspecified trigger     Assessment:    She has history of chronic dyspnea on exertion her pulmonary function test almost 1.5 years ago in December 2019 showed normal FEV1 and FVC, normal lung volume and had mild increase in residual volume and shows mild reduction diffusion capacity, she did have history of a smoking for more than 30 years and likely early emphysema or pulmonary hypertension could be the cause of mild reduction diffusion capacity which was normal in 2014, her last chest x-ray on 11/27/2019 did not show any acute or chronic changes, she had stop smoking more than 22 years ago.    Pulmonary function test done 01/17/2023 showed mild reduction in FEV1 with airway reversibility with 12% improvement and also airway trapping on lung volume.  I have discussed with patient to continue with Flovent be compliant with Flovent.  She may need additional bronchodilator with Flovent although she denies significant complaint at this time and denies chronic cough wheezing or shortness of breath on regular activities.      Plan:    Pulmonary function test results seen 01/70/23 and compared to 2019 there is reduction in FEV1/mild reduction and airway trapping is present which was present before and also stable diffusion capacity  Her symptoms of dyspnea on exertion has been stable and no worsening  Encourage increase activity and exercise and weight loss   Continue Flovent at current [id number] 2 puff twice a day  If Flovent from the pharmacist change we will change it to the alternative

## 2024-10-16 ENCOUNTER — TELEPHONE (OUTPATIENT)
Dept: PULMONOLOGY | Age: 79
End: 2024-10-16

## 2024-10-16 NOTE — TELEPHONE ENCOUNTER
Patient states her pharmacy can no longer get more Flovent, it is not being manufactured anymore     Can you prescribe an alternative?

## 2024-10-22 ENCOUNTER — TELEPHONE (OUTPATIENT)
Dept: PULMONOLOGY | Age: 79
End: 2024-10-22

## 2024-10-22 NOTE — TELEPHONE ENCOUNTER
Received a PA for Fluticasone Propionate HFA through covermymeds.  This PA was closed because an alternative therapy was sent in to her pharmacy.

## 2024-11-27 RX ORDER — SIMVASTATIN 40 MG
40 TABLET ORAL NIGHTLY
Qty: 30 TABLET | Refills: 1 | Status: SHIPPED | OUTPATIENT
Start: 2024-11-27

## 2024-11-27 RX ORDER — CITALOPRAM HYDROBROMIDE 40 MG/1
40 TABLET ORAL DAILY
Qty: 30 TABLET | Refills: 1 | Status: SHIPPED | OUTPATIENT
Start: 2024-11-27

## 2024-11-27 NOTE — TELEPHONE ENCOUNTER
La Collado is calling to request a refill on the following medication(s):    Medication Request:  Requested Prescriptions     Pending Prescriptions Disp Refills    simvastatin (ZOCOR) 40 MG tablet [Pharmacy Med Name: SIMVASTATIN 40 MG TABLET] 30 tablet 1     Sig: TAKE ONE TABLET BY MOUTH ONCE NIGHTLY    citalopram (CELEXA) 40 MG tablet [Pharmacy Med Name: CITALOPRAM HBR 40 MG TABLET] 30 tablet 1     Sig: TAKE 1 TABLET BY MOUTH DAILY       Last Visit Date (If Applicable):  7/24/2024    Next Visit Date:    12/18/2024

## 2024-12-03 ENCOUNTER — TELEPHONE (OUTPATIENT)
Dept: PULMONOLOGY | Age: 79
End: 2024-12-03

## 2024-12-03 RX ORDER — FLUTICASONE FUROATE 100 UG/1
1 POWDER RESPIRATORY (INHALATION) 2 TIMES DAILY
Qty: 1 EACH | Refills: 2 | Status: SHIPPED | OUTPATIENT
Start: 2024-12-03

## 2024-12-03 RX ORDER — FLUTICASONE FUROATE 100 UG/1
1 POWDER RESPIRATORY (INHALATION) 2 TIMES DAILY
Qty: 1 EACH | Refills: 2 | OUTPATIENT
Start: 2024-12-03

## 2024-12-03 NOTE — TELEPHONE ENCOUNTER
Denied  PA Detail   Note from payer: Request Reference Number: [id number]. FLUTICAS HFA AER 110MCG is denied for not meeting the prior authorization requirement(s). Details of this decision are in the notice attached below or have been faxed to you. Appeals are not supported through ePA. Please refer to the fax case notice for appeals information and instructions.  Payer: [employer] - Medicare Case ID: [id number]    [phone]    [phone]  Electronic appeal: Not supported  Prior auth initiated by: [facility] [id number] - [place], [place] - [address] - P [phone] - F [phone]    [phone]

## 2024-12-03 NOTE — TELEPHONE ENCOUNTER
Dr. Roberts,  insurance has DENIED Flovent  because the patient has not tried Arnuity Ellipta and failed.  If you agree please place a script for Arnuity or you can write an appeal letter as to why the patient cannot take Arnuity and can only take Flovent HFA.

## 2024-12-06 DIAGNOSIS — I10 PRIMARY HYPERTENSION: Chronic | ICD-10-CM

## 2024-12-06 NOTE — TELEPHONE ENCOUNTER
..Request for   Requested Prescriptions     Pending Prescriptions Disp Refills    valsartan (DIOVAN) 160 MG tablet [Pharmacy Med Name: VALSARTAN 160 MG TABLET] 30 tablet 3     Sig: TAKE 1 TABLET BY MOUTH DAILY    .      Please review and e-scribe to pharmacy listed in chart if appropriate. Thank you.      Last Visit Date: 7/24/2024  Next Visit Date: 12/18/2024    Future Appointments   Date Time Provider Department Center   12/18/2024 12:40 PM Jeri Parkinson MD [facility]   1/22/2025  2:20 PM Jeri Parkinson MD [facility]   1/24/2025 11:10 AM Seun Pink MD [facility] None   10/15/2025  1:00 PM Allan Roberts MD Resp Spec [email or web address]       Health Maintenance   Topic Date Due    DTaP/Tdap/Td vaccine (1 - Tdap) Never done    Shingles vaccine (1 of 2) Never done    Respiratory Syncytial Virus (RSV) Pregnant or age 60 yrs+ (1 - 1-dose 75+ series) Never done    Diabetic Alb to Cr ratio (uACR) test  11/05/2020    COVID-19 Vaccine (3 - 2023-24 season) 09/01/2024    Lipids  07/18/2025    GFR test (Diabetes, CKD 3-4, OR last GFR 15-59)  07/18/2025    Depression Monitoring  07/24/2025    DEXA (modify frequency per FRAX score)  Completed    Annual Wellness Visit (Medicare Advantage)  Completed    Flu vaccine  Completed    Pneumococcal 65+ years Vaccine  Completed    Hepatitis C screen  Completed    Hepatitis A vaccine  Aged Out    Hepatitis B vaccine  Aged Out    Hib vaccine  Aged Out    Polio vaccine  Aged Out    Meningococcal (ACWY) vaccine  Aged Out    Diabetic foot exam  Discontinued    A1C test (Diabetic or Prediabetic)  Discontinued    Diabetic retinal exam  Discontinued    Colonoscopy  Discontinued       Hemoglobin A1C (%)   Date Value   04/18/2024 7.1   10/26/2023 7.4   01/31/2023 6.5             ( goal A1C is < 7)   No components found for: \"LABMICR\"  No components found for: \"LDLCHOLESTEROL\", \"LDLCALC\"    (goal LDL is <100)   AST (U/L)   Date Value   02/05/2023 32 (H)     ALT (U/L)   Date Value

## 2024-12-09 ENCOUNTER — TELEPHONE (OUTPATIENT)
Dept: INTERNAL MEDICINE | Age: 79
End: 2024-12-09

## 2024-12-09 DIAGNOSIS — I65.23 BILATERAL CAROTID ARTERY STENOSIS: Primary | ICD-10-CM

## 2024-12-09 RX ORDER — VALSARTAN 160 MG/1
160 TABLET ORAL DAILY
Qty: 30 TABLET | Refills: 3 | Status: SHIPPED | OUTPATIENT
Start: 2024-12-09

## 2024-12-09 NOTE — TELEPHONE ENCOUNTER
Patient is in need of a vascular referral for Delfino Marquez [phone] if any questions.  Patient states original referred to does not accept insurance

## 2024-12-11 ENCOUNTER — TELEPHONE (OUTPATIENT)
Dept: INTERNAL MEDICINE | Age: 79
End: 2024-12-11

## 2024-12-11 DIAGNOSIS — I65.23 BILATERAL CAROTID ARTERY STENOSIS: Primary | ICD-10-CM

## 2024-12-11 NOTE — TELEPHONE ENCOUNTER
[facility] called states patient needs a carotid scan prior to them doing the consultation.  Please advise

## 2024-12-18 ENCOUNTER — TELEPHONE (OUTPATIENT)
Dept: VASCULAR SURGERY | Age: 79
End: 2024-12-18

## 2024-12-18 ENCOUNTER — OFFICE VISIT (OUTPATIENT)
Dept: INTERNAL MEDICINE | Age: 79
End: 2024-12-18
Payer: MEDICARE

## 2024-12-18 VITALS
OXYGEN SATURATION: 98 % | HEIGHT: 60 IN | HEART RATE: 88 BPM | WEIGHT: 177 LBS | SYSTOLIC BLOOD PRESSURE: 129 MMHG | TEMPERATURE: 97.2 F | DIASTOLIC BLOOD PRESSURE: 74 MMHG | BODY MASS INDEX: 34.75 KG/M2

## 2024-12-18 DIAGNOSIS — E11.21 TYPE 2 DIABETES MELLITUS WITH DIABETIC NEPHROPATHY, WITHOUT LONG-TERM CURRENT USE OF INSULIN (HCC): ICD-10-CM

## 2024-12-18 DIAGNOSIS — I65.23 BILATERAL CAROTID ARTERY STENOSIS: ICD-10-CM

## 2024-12-18 DIAGNOSIS — F32.0 CURRENT MILD EPISODE OF MAJOR DEPRESSIVE DISORDER WITHOUT PRIOR EPISODE (HCC): Primary | ICD-10-CM

## 2024-12-18 DIAGNOSIS — E66.01 MORBID (SEVERE) OBESITY DUE TO EXCESS CALORIES: ICD-10-CM

## 2024-12-18 PROCEDURE — 3074F SYST BP LT 130 MM HG: CPT | Performed by: INTERNAL MEDICINE

## 2024-12-18 PROCEDURE — 3078F DIAST BP <80 MM HG: CPT | Performed by: INTERNAL MEDICINE

## 2024-12-18 PROCEDURE — 99211 OFF/OP EST MAY X REQ PHY/QHP: CPT | Performed by: INTERNAL MEDICINE

## 2024-12-18 PROCEDURE — 3051F HG A1C>EQUAL 7.0%<8.0%: CPT | Performed by: INTERNAL MEDICINE

## 2024-12-18 PROCEDURE — 99214 OFFICE O/P EST MOD 30 MIN: CPT | Performed by: INTERNAL MEDICINE

## 2024-12-18 PROCEDURE — 1159F MED LIST DOCD IN RCRD: CPT | Performed by: INTERNAL MEDICINE

## 2024-12-18 PROCEDURE — 1123F ACP DISCUSS/DSCN MKR DOCD: CPT | Performed by: INTERNAL MEDICINE

## 2024-12-18 ASSESSMENT — PATIENT HEALTH QUESTIONNAIRE - PHQ9
4. FEELING TIRED OR HAVING LITTLE ENERGY: NOT AT ALL
10. IF YOU CHECKED OFF ANY PROBLEMS, HOW DIFFICULT HAVE THESE PROBLEMS MADE IT FOR YOU TO DO YOUR WORK, TAKE CARE OF THINGS AT HOME, OR GET ALONG WITH OTHER PEOPLE: NOT DIFFICULT AT ALL
SUM OF ALL RESPONSES TO PHQ9 QUESTIONS 1 & 2: 0
SUM OF ALL RESPONSES TO PHQ QUESTIONS 1-9: 0
2. FEELING DOWN, DEPRESSED OR HOPELESS: NOT AT ALL
5. POOR APPETITE OR OVEREATING: NOT AT ALL
9. THOUGHTS THAT YOU WOULD BE BETTER OFF DEAD, OR OF HURTING YOURSELF: NOT AT ALL
8. MOVING OR SPEAKING SO SLOWLY THAT OTHER PEOPLE COULD HAVE NOTICED. OR THE OPPOSITE, BEING SO FIGETY OR RESTLESS THAT YOU HAVE BEEN MOVING AROUND A LOT MORE THAN USUAL: NOT AT ALL
1. LITTLE INTEREST OR PLEASURE IN DOING THINGS: NOT AT ALL
3. TROUBLE FALLING OR STAYING ASLEEP: NOT AT ALL
SUM OF ALL RESPONSES TO PHQ QUESTIONS 1-9: 0
SUM OF ALL RESPONSES TO PHQ QUESTIONS 1-9: 0
6. FEELING BAD ABOUT YOURSELF - OR THAT YOU ARE A FAILURE OR HAVE LET YOURSELF OR YOUR FAMILY DOWN: NOT AT ALL
7. TROUBLE CONCENTRATING ON THINGS, SUCH AS READING THE NEWSPAPER OR WATCHING TELEVISION: NOT AT ALL
SUM OF ALL RESPONSES TO PHQ QUESTIONS 1-9: 0

## 2024-12-18 ASSESSMENT — ENCOUNTER SYMPTOMS
GASTROINTESTINAL NEGATIVE: 1
RESPIRATORY NEGATIVE: 1
ALLERGIC/IMMUNOLOGIC NEGATIVE: 1
EYES NEGATIVE: 1

## 2024-12-18 NOTE — TELEPHONE ENCOUNTER
Patient informed carotid scan needs scheduled prior to consultation. Verbalized understanding. Transferred patient to central scheduling

## 2024-12-18 NOTE — PROGRESS NOTES
11/05/2020    COVID-19 Vaccine (3 - 2023-24 season) 09/01/2024       ASSESSMENT AND PLAN:       Diagnosis Orders   1. Current mild episode of major depressive disorder without prior episode (HCC)        2. Bilateral carotid artery stenosis        3. Type 2 diabetes mellitus with diabetic nephropathy, without long-term current use of insulin (HCC)  empagliflozin (JARDIANCE) 10 MG tablet    Basic Metabolic Panel    Albumin/Creatinine Ratio, Urine      4. Morbid (severe) obesity due to excess calories (E66.01)        5. Body mass index [BMI] 40.0-44.9, adult (Z68.41)        Will stop patient's Farxiga and start Jardiance  Have patient complete BMP in 1 week  Follow-up with nephrology next month      FOLLOW UP:   1.  La received counseling on the following healthy behaviors: nutrition and exercise    2.  Reviewed prior labs and health maintenance.      3.  Discussed use, benefit, and side effects of prescribed medications.  Barriers to medication compliance addressed.  All patient questions answered.  Pt voiced understanding.     4.  Continue current medications, diet and exercise.              Orders Placed This Encounter   Medications    empagliflozin (JARDIANCE) 10 MG tablet     Sig: Take 1 tablet by mouth daily     Dispense:  30 tablet     Refill:  3          Completed Refills               Requested Prescriptions     Signed Prescriptions Disp Refills    empagliflozin (JARDIANCE) 10 MG tablet 30 tablet 3     Sig: Take 1 tablet by mouth daily       5. Patient given educational materials - see patient instructions    6. Was a self-tracking handout given in paper form or via [employer] ([employer])t? Yes  If yes, see orders or list here.      Orders Placed This Encounter   Procedures    Basic Metabolic Panel     Please get this labwork done before your next visit.     Standing Status:   Future     Standing Expiration Date:   12/17/2025    Albumin/Creatinine Ratio, Urine     Standing Status:   Future     Standing Expiration Date:

## 2024-12-27 ENCOUNTER — HOSPITAL ENCOUNTER (OUTPATIENT)
Age: 79
Discharge: HOME | End: 2024-12-27
Payer: MEDICARE

## 2024-12-27 DIAGNOSIS — E11.21 TYPE 2 DIABETES MELLITUS WITH DIABETIC NEPHROPATHY, WITHOUT LONG-TERM CURRENT USE OF INSULIN (HCC): ICD-10-CM

## 2024-12-27 LAB
ANION GAP SERPL CALCULATED.3IONS-SCNC: 13 MMOL/L (ref 9–16)
BUN SERPL-MCNC: 17 MG/DL (ref 8–23)
CALCIUM SERPL-MCNC: 10.1 MG/DL (ref 8.6–10.4)
CHLORIDE SERPL-SCNC: 100 MMOL/L (ref 98–107)
CO2 SERPL-SCNC: 23 MMOL/L (ref 20–31)
CREAT SERPL-MCNC: 1.2 MG/DL (ref 0.6–0.9)
CREAT UR-MCNC: 61.8 MG/DL (ref 28–217)
GFR, ESTIMATED: 46 ML/MIN/1.73M2
GLUCOSE SERPL-MCNC: 353 MG/DL (ref 74–99)
MICROALBUMIN UR-MCNC: 210 MG/L (ref 0–20)
MICROALBUMIN/CREAT UR-RTO: 340 MCG/MG CREAT (ref 0–25)
POTASSIUM SERPL-SCNC: 4.4 MMOL/L (ref 3.7–5.3)
SODIUM SERPL-SCNC: 136 MMOL/L (ref 136–145)

## 2024-12-27 PROCEDURE — 82570 ASSAY OF URINE CREATININE: CPT

## 2024-12-27 PROCEDURE — 36415 COLL VENOUS BLD VENIPUNCTURE: CPT

## 2024-12-27 PROCEDURE — 82043 UR ALBUMIN QUANTITATIVE: CPT

## 2024-12-27 PROCEDURE — 80048 BASIC METABOLIC PNL TOTAL CA: CPT

## 2024-12-31 DIAGNOSIS — E11.21 TYPE 2 DIABETES MELLITUS WITH DIABETIC NEPHROPATHY, WITHOUT LONG-TERM CURRENT USE OF INSULIN (HCC): Primary | ICD-10-CM

## 2025-01-03 ENCOUNTER — HOSPITAL ENCOUNTER (OUTPATIENT)
Dept: VASCULAR LAB | Age: 80
Discharge: HOME | End: 2025-01-03
Attending: INTERNAL MEDICINE
Payer: MEDICARE

## 2025-01-03 ENCOUNTER — TELEPHONE (OUTPATIENT)
Dept: INTERNAL MEDICINE | Age: 80
End: 2025-01-03

## 2025-01-03 DIAGNOSIS — I65.23 BILATERAL CAROTID ARTERY STENOSIS: ICD-10-CM

## 2025-01-03 LAB
VAS LEFT CCA DIST EDV: 7.3 CM/S
VAS LEFT CCA DIST PSV: 39.2 CM/S
VAS LEFT CCA MID EDV: 12.7 CM/S
VAS LEFT CCA MID PSV: 56.6 CM/S
VAS LEFT CCA PROX EDV: 9.7 CM/S
VAS LEFT CCA PROX PSV: 67.2 CM/S
VAS LEFT ECA EDV: 7.7 CM/S
VAS LEFT ECA PSV: 46.2 CM/S
VAS LEFT ICA DIST EDV: 22 CM/S
VAS LEFT ICA DIST PSV: 121 CM/S
VAS LEFT ICA MID EDV: 20.9 CM/S
VAS LEFT ICA MID PSV: 180 CM/S
VAS LEFT ICA PROX EDV: 54.9 CM/S
VAS LEFT ICA PROX PSV: 288 CM/S
VAS LEFT ICA/CCA PSV: 7.35
VAS LEFT VERTEBRAL EDV: 5.32 CM/S
VAS LEFT VERTEBRAL PSV: 26 CM/S
VAS RIGHT CCA DIST EDV: 13.7 CM/S
VAS RIGHT CCA DIST PSV: 101 CM/S
VAS RIGHT CCA MID EDV: 14.9 CM/S
VAS RIGHT CCA MID PSV: 95.1 CM/S
VAS RIGHT CCA PROX EDV: 14 CM/S
VAS RIGHT CCA PROX PSV: 63.7 CM/S
VAS RIGHT ECA EDV: 38 CM/S
VAS RIGHT ECA PSV: 299 CM/S
VAS RIGHT ICA DIST EDV: 19.9 CM/S
VAS RIGHT ICA DIST PSV: 102 CM/S
VAS RIGHT ICA MID EDV: 17.4 CM/S
VAS RIGHT ICA MID PSV: 77 CM/S
VAS RIGHT ICA PROX EDV: 23.6 CM/S
VAS RIGHT ICA PROX PSV: 110 CM/S
VAS RIGHT ICA/CCA PSV: 1.09
VAS RIGHT VERTEBRAL EDV: 14 CM/S
VAS RIGHT VERTEBRAL PSV: 62.7 CM/S

## 2025-01-03 PROCEDURE — 93880 EXTRACRANIAL BILAT STUDY: CPT

## 2025-01-03 PROCEDURE — 93880 EXTRACRANIAL BILAT STUDY: CPT | Performed by: SURGERY

## 2025-01-03 NOTE — TELEPHONE ENCOUNTER
Pt receiving Farxiga 5 mg tabs through [employer] through 12/31/2024.    Review of chart shows pt no longer has medication listed in active meds. Med history shows med d/c'd by PCP on 12/18/24 as therapy completed, pt reported not taking.

## 2025-01-07 NOTE — TELEPHONE ENCOUNTER
AST (U/L)   Date Value   02/05/2023 32 (H)     ALT (U/L)   Date Value   02/05/2023 13     BUN (mg/dL)   Date Value   12/27/2024 17     BP Readings from Last 3 Encounters:   12/18/24 129/74   10/15/24 119/82   07/24/24 120/70          (goal 120/80)    All Future Testing planned in [facility]  Lab Frequency Next Occurrence   Basic Metabolic Panel Once 01/06/2025   PTH, Intact with Ionized Calcium Once 01/06/2025   Phosphorus Once 01/06/2025   Vitamin D 25 Hydroxy Once 01/06/2025   Creatinine, Random Urine Once 01/06/2025   Protein, urine, random Once 01/06/2025   HECTOR DIGITAL SCREEN W OR WO CAD BILATERAL Once 04/18/2024   Protein / Creatinine Ratio, Urine Once 12/31/2024         Patient Active Problem List:     S/P total knee arthroplasty     Type 2 diabetes mellitus with diabetic nephropathy, without long-term current use of insulin ([facility])     Hypertension     Asthma     Arthritis     Acquired trigger finger     Osteoarthritis of knee     Morbid obesity with BMI of 40.0-44.9, adult     Current mild episode of major depressive disorder without prior episode ([facility])     Bilateral carotid artery stenosis     Other specified glaucoma     Chronic systolic (congestive) heart failure     Lumbar radiculopathy     Lumbar degenerative disc disease     Lumbar spondylosis     Chronic renal disease, stage III ([facility]) [923646]     Gastrointestinal hemorrhage     Lumbosacral spondylosis without myelopathy     Spondylosis of cervical region without myelopathy or radiculopathy

## 2025-01-30 ENCOUNTER — INITIAL CONSULT (OUTPATIENT)
Dept: VASCULAR SURGERY | Age: 80
End: 2025-01-30
Payer: MEDICARE

## 2025-01-30 VITALS
HEIGHT: 60 IN | SYSTOLIC BLOOD PRESSURE: 131 MMHG | BODY MASS INDEX: 34.95 KG/M2 | WEIGHT: 178 LBS | RESPIRATION RATE: 16 BRPM | HEART RATE: 102 BPM | OXYGEN SATURATION: 96 % | DIASTOLIC BLOOD PRESSURE: 82 MMHG

## 2025-01-30 DIAGNOSIS — I65.23 ASYMPTOMATIC BILATERAL CAROTID ARTERY STENOSIS: Primary | ICD-10-CM

## 2025-01-30 PROCEDURE — 1159F MED LIST DOCD IN RCRD: CPT | Performed by: SURGERY

## 2025-01-30 PROCEDURE — 3079F DIAST BP 80-89 MM HG: CPT | Performed by: SURGERY

## 2025-01-30 PROCEDURE — 1125F AMNT PAIN NOTED PAIN PRSNT: CPT | Performed by: SURGERY

## 2025-01-30 PROCEDURE — 3075F SYST BP GE 130 - 139MM HG: CPT | Performed by: SURGERY

## 2025-01-30 PROCEDURE — 99203 OFFICE O/P NEW LOW 30 MIN: CPT | Performed by: SURGERY

## 2025-01-30 PROCEDURE — 1123F ACP DISCUSS/DSCN MKR DOCD: CPT | Performed by: SURGERY

## 2025-01-30 RX ORDER — PANTOPRAZOLE SODIUM 40 MG/1
1 TABLET, DELAYED RELEASE ORAL 2 TIMES DAILY
COMMUNITY

## 2025-01-30 ASSESSMENT — ENCOUNTER SYMPTOMS
CHEST TIGHTNESS: 0
COLOR CHANGE: 0
ABDOMINAL PAIN: 0
BACK PAIN: 1
ALLERGIC/IMMUNOLOGIC NEGATIVE: 1
SHORTNESS OF BREATH: 0

## 2025-01-30 NOTE — PROGRESS NOTES
Division of Vascular Surgery        New Consult      Physician Requesting Consult:  Jeri Parkinson MD    Reason for Consult:   Carotid stenosis    Chief Complaint:      Evaluation of carotid stenosis    History of Present Illness:      La Collado is a 79 y.o. woman who presents with asymptomatic bilateral extracranial carotid stenosis.  She denies any episodes of unilateral weakness, facial droop, thick/slurred speech or symptoms of amaurosis fugax to suggest stroke of TIA.  Says they picked up on carotid stenosis on workup for knee surgery she underwent almost 12 years ago.  She has been following Dr. KAYLEIGH Almanzar at [facility], but due to insurance changes is coming to see me.   Over the years her carotid disease on the left has increased, Dr. Almanzar was considering trans carotid artery stent placement and awaiting CT to evaluate.      Patient has a lot of arthritis issues in her hips, back and knees.  Worked as a nurse on our orthopedic floor.  She is a little nervous given her medical issues with diabetes and asthma.  She is compliant with her medications taking aspirin and statins.  She does not smoke.      Medical History:     Past Medical History:   Diagnosis Date    Acquired trigger finger 4/27/2018    ROSELIA (acute kidney injury) ([facility]) 2011    Allergic rhinitis     Anesthesia complication     after or bp dropped kidney labs elevated for few years    Anxiety     Arthritis     Asthma     Carotid stenosis, asymptomatic     16-49%    CKD (chronic kidney disease) stage 3, GFR 30-59 ml/min ([facility])     Diabetes mellitus ([facility])     Dry skin     Dyspnea     Frequency of urination     History of bronchitis     Hyperkalemia 1/24/2014    Hypertension     Knee pain, left     Obesity     Obsessive compulsive disorder     Osteoarthritis of knee 4/27/2018    Post-nasal drip     S/P total knee arthroplasty 1/24/2014    Sleep apnea     Urgency incontinence     Urinary, incontinence, stress female     Wears glasses     Wears

## 2025-01-31 RX ORDER — SIMVASTATIN 40 MG
40 TABLET ORAL NIGHTLY
Qty: 30 TABLET | Refills: 1 | Status: SHIPPED | OUTPATIENT
Start: 2025-01-31

## 2025-01-31 NOTE — TELEPHONE ENCOUNTER
Last visit: 12/18/24  Last Med refill: 11/27/24  Does patient have enough medication for 72 hours: No:     Next Visit Date:   Future Appointments   Date Time Provider Department Center   2/28/2025  9:50 AM Seun Pink MD [facility] None   5/29/2025 11:00 [facility]   10/15/2025  1:00 PM Allan Roberts MD Resp Spec [facility]       Health Maintenance   Topic Date Due    Annual Wellness Visit (Medicare Advantage)  01/01/2025    DTaP/Tdap/Td vaccine (1 - Tdap) 12/18/2025 (Originally 7/3/1964)    Shingles vaccine (1 of 2) 12/18/2025 (Originally 7/3/1995)    COVID-19 Vaccine (3 - 2023-24 season) 12/18/2025 (Originally 9/1/2024)    Respiratory Syncytial Virus (RSV) Pregnant or age 60 yrs+ (1 - 1-dose 75+ series) 12/18/2025 (Originally 7/3/2020)    Lipids  07/18/2025    Depression Monitoring  12/18/2025    Diabetic Alb to Cr ratio (uACR) test  12/27/2025    GFR test (Diabetes, CKD 3-4, OR last GFR 15-59)  12/27/2025    DEXA (modify frequency per FRAX score)  Completed    Flu vaccine  Completed    Pneumococcal 65+ years Vaccine  Completed    Hepatitis C screen  Completed    Hepatitis A vaccine  Aged Out    Hepatitis B vaccine  Aged Out    Hib vaccine  Aged Out    Polio vaccine  Aged Out    Meningococcal (ACWY) vaccine  Aged Out    Diabetic foot exam  Discontinued    A1C test (Diabetic or Prediabetic)  Discontinued    Diabetic retinal exam  Discontinued    Colonoscopy  Discontinued       Hemoglobin A1C (%)   Date Value   04/18/2024 7.1   10/26/2023 7.4   01/31/2023 6.5             ( goal A1C is < 7)   No components found for: \"LABMICR\"  No components found for: \"LDLCHOLESTEROL\", \"LDLCALC\"    (goal LDL is <100)   AST (U/L)   Date Value   02/05/2023 32 (H)     ALT (U/L)   Date Value   02/05/2023 13     BUN (mg/dL)   Date Value   12/27/2024 17     BP Readings from Last 3 Encounters:   01/30/25 131/82   12/18/24 129/74   10/15/24 119/82          (goal 120/80)    All Future Testing planned

## 2025-02-14 ENCOUNTER — HOSPITAL ENCOUNTER (OUTPATIENT)
Age: 80
Discharge: HOME | End: 2025-02-14
Payer: MEDICARE

## 2025-02-14 DIAGNOSIS — N18.31 STAGE 3A CHRONIC KIDNEY DISEASE (HCC): ICD-10-CM

## 2025-02-14 DIAGNOSIS — E11.21 TYPE 2 DIABETES MELLITUS WITH DIABETIC NEPHROPATHY, WITHOUT LONG-TERM CURRENT USE OF INSULIN (HCC): ICD-10-CM

## 2025-02-14 LAB
25(OH)D3 SERPL-MCNC: 17 NG/ML (ref 30–100)
ANION GAP SERPL CALCULATED.3IONS-SCNC: 15 MMOL/L (ref 9–16)
BUN SERPL-MCNC: 15 MG/DL (ref 8–23)
CA-I BLD-SCNC: 1.21 MMOL/L (ref 1.13–1.33)
CALCIUM SERPL-MCNC: 9.6 MG/DL (ref 8.6–10.4)
CHLORIDE SERPL-SCNC: 101 MMOL/L (ref 98–107)
CO2 SERPL-SCNC: 20 MMOL/L (ref 20–31)
CREAT SERPL-MCNC: 1 MG/DL (ref 0.6–0.9)
CREAT UR-MCNC: 190 MG/DL (ref 28–217)
CREAT UR-MCNC: 193 MG/DL (ref 28–217)
GFR, ESTIMATED: 57 ML/MIN/1.73M2
GLUCOSE SERPL-MCNC: 387 MG/DL (ref 74–99)
PHOSPHATE SERPL-MCNC: 2.6 MG/DL (ref 2.5–4.5)
POTASSIUM SERPL-SCNC: 4.6 MMOL/L (ref 3.7–5.3)
PTH-INTACT SERPL-MCNC: 37 PG/ML (ref 15–65)
SODIUM SERPL-SCNC: 136 MMOL/L (ref 136–145)
TOTAL PROTEIN, URINE: 167 MG/DL
TOTAL PROTEIN, URINE: 168 MG/DL
URINE TOTAL PROTEIN CREATININE RATIO: 0.87 (ref 0–0.2)
URINE TOTAL PROTEIN CREATININE RATIO: 0.88 (ref 0–0.2)

## 2025-02-14 PROCEDURE — 36415 COLL VENOUS BLD VENIPUNCTURE: CPT

## 2025-02-14 PROCEDURE — 84156 ASSAY OF PROTEIN URINE: CPT

## 2025-02-14 PROCEDURE — 82306 VITAMIN D 25 HYDROXY: CPT

## 2025-02-14 PROCEDURE — 80048 BASIC METABOLIC PNL TOTAL CA: CPT

## 2025-02-14 PROCEDURE — 82570 ASSAY OF URINE CREATININE: CPT

## 2025-02-14 PROCEDURE — 82330 ASSAY OF CALCIUM: CPT

## 2025-02-14 PROCEDURE — 83970 ASSAY OF PARATHORMONE: CPT

## 2025-02-14 PROCEDURE — 84100 ASSAY OF PHOSPHORUS: CPT

## 2025-03-05 ENCOUNTER — TELEMEDICINE (OUTPATIENT)
Dept: INTERNAL MEDICINE | Age: 80
End: 2025-03-05

## 2025-03-05 DIAGNOSIS — J06.9 UPPER RESPIRATORY TRACT INFECTION, UNSPECIFIED TYPE: Primary | ICD-10-CM

## 2025-03-05 RX ORDER — LORATADINE AND PSEUDOEPHEDRINE SULFATE 5; 120 MG/1; MG/1
1 TABLET, EXTENDED RELEASE ORAL 2 TIMES DAILY
Qty: 10 TABLET | Refills: 0 | Status: SHIPPED | OUTPATIENT
Start: 2025-03-05

## 2025-03-05 RX ORDER — AZITHROMYCIN 250 MG/1
TABLET, FILM COATED ORAL
Qty: 6 TABLET | Refills: 0 | Status: SHIPPED | OUTPATIENT
Start: 2025-03-05 | End: 2025-03-15

## 2025-03-05 SDOH — ECONOMIC STABILITY: FOOD INSECURITY: WITHIN THE PAST 12 MONTHS, YOU WORRIED THAT YOUR FOOD WOULD RUN OUT BEFORE YOU GOT MONEY TO BUY MORE.: NEVER TRUE

## 2025-03-05 SDOH — ECONOMIC STABILITY: FOOD INSECURITY: WITHIN THE PAST 12 MONTHS, THE FOOD YOU BOUGHT JUST DIDN'T LAST AND YOU DIDN'T HAVE MONEY TO GET MORE.: NEVER TRUE

## 2025-03-05 ASSESSMENT — PATIENT HEALTH QUESTIONNAIRE - PHQ9
SUM OF ALL RESPONSES TO PHQ QUESTIONS 1-9: 0
3. TROUBLE FALLING OR STAYING ASLEEP: NOT AT ALL
SUM OF ALL RESPONSES TO PHQ QUESTIONS 1-9: 0
8. MOVING OR SPEAKING SO SLOWLY THAT OTHER PEOPLE COULD HAVE NOTICED. OR THE OPPOSITE, BEING SO FIGETY OR RESTLESS THAT YOU HAVE BEEN MOVING AROUND A LOT MORE THAN USUAL: NOT AT ALL
SUM OF ALL RESPONSES TO PHQ QUESTIONS 1-9: 0
9. THOUGHTS THAT YOU WOULD BE BETTER OFF DEAD, OR OF HURTING YOURSELF: NOT AT ALL
6. FEELING BAD ABOUT YOURSELF - OR THAT YOU ARE A FAILURE OR HAVE LET YOURSELF OR YOUR FAMILY DOWN: NOT AT ALL
4. FEELING TIRED OR HAVING LITTLE ENERGY: NOT AT ALL
1. LITTLE INTEREST OR PLEASURE IN DOING THINGS: NOT AT ALL
2. FEELING DOWN, DEPRESSED OR HOPELESS: NOT AT ALL
5. POOR APPETITE OR OVEREATING: NOT AT ALL
10. IF YOU CHECKED OFF ANY PROBLEMS, HOW DIFFICULT HAVE THESE PROBLEMS MADE IT FOR YOU TO DO YOUR WORK, TAKE CARE OF THINGS AT HOME, OR GET ALONG WITH OTHER PEOPLE: NOT DIFFICULT AT ALL
SUM OF ALL RESPONSES TO PHQ QUESTIONS 1-9: 0
7. TROUBLE CONCENTRATING ON THINGS, SUCH AS READING THE NEWSPAPER OR WATCHING TELEVISION: NOT AT ALL

## 2025-03-05 NOTE — PROGRESS NOTES
Documentation:  La Collado is a 79 y.o. female evaluated via telephone on 3/5/2025 for Nasal Congestion (Patient states she has yellow drainage for 1 week), Cough (Patient coughing up yellow phlegm ), and Hoarse  Patient reports eating yellow nasal drainage that started last Thursday.  She has been blowing out a combination of yellow-green discharge blood-tinged at times.  She denies any fevers chills.  She has had no sick contacts.  She reports she is coughing due to a tickle in her throat but no chest tightness or congestion.   Diagnosis Orders   1. Upper respiratory tract infection, unspecified type  azithromycin (ZITHROMAX) 250 MG tablet    loratadine-pseudoephedrine (CLARITIN-D 12 HOUR) 5-120 MG per extended release tablet          Total Time: minutes: 5-10 minutes    La Collado was evaluated through a synchronous (real-time) audio encounter. Patient identification was verified at the start of the visit. She (or guardian if applicable) is aware that this is a billable service, which includes applicable co-pays. This visit was conducted with the patient's (and/or legal guardian's) verbal consent. She has not had a related appointment within my department in the past 7 days or scheduled within the next 24 hours.   The patient was located at Home: [address] [place] [address].  The provider was located at Facility (Appt Dept): [address] [place] [id number].  Confirm you are appropriately licensed, registered, or certified to deliver care in the state where the patient is located as indicated above. If you are not or unsure, please re-schedule the visit: Yes, I confirm.     Note: not billable if this call serves to triage the patient into an appointment for the relevant concern      .        Jeri Parkinson MD

## 2025-04-01 ENCOUNTER — TELEPHONE (OUTPATIENT)
Dept: PHARMACY | Facility: CLINIC | Age: 80
End: 2025-04-01

## 2025-04-01 NOTE — TELEPHONE ENCOUNTER
[facility]: ADHERENCE REVIEW  Identified care gap per [employer]: fills at [employer]: Diabetes and Statin adherence    Patient also appears to be prescribed: Diabetes and Statin    ASSESSMENT  DIABETES ADHERENCE    Insurance Records claims through 2025 (Prior Year PDC = not reported; YTD PDC = 66%; Potential Fail Date: 25):   METFORMIN TAB 1000MG last filled on 25 for 30 day supply. Next refill due: 25    Prescribed si tablet/capsule daily    Per Reconcile Dispense History: last filled on 25 for 30 day supply.     No outreach to the pharmacy     Lab Results   Component Value Date    LABA1C 7.1 2024    LABA1C 7.4 10/26/2023    LABA1C 6.5 2023     NOTE: A1c <9%    STATIN ADHERENCE    Insurance Records claims through 2025 (Prior Year PDC = not reported; YTD PDC = FIRST FILL; Potential Fail Date: 25):   SIMVASTATIN TAB 40MG last filled on 25 for 30 day supply. Next refill due: 25    Prescribed si tablet/capsule daily    Per Reconcile Dispense History: last filled on 25 for 30 day supply.     No outreach to the pharmacy     Lab Results   Component Value Date    CHOL 139 2024    TRIG 96 2024    HDL 70 2024     Lab Results   Component Value Date    LDL 50 2024      ALT   Date Value Ref Range Status   2023 13 5 - 33 U/L Final     AST   Date Value Ref Range Status   2023 32 (H) <32 U/L Final     The 10-year ASCVD risk score (Crystal LÓPEZ, et al., 2019) is: 56%    Values used to calculate the score:      Age: 79 years      Sex: Female      Is Non- : No      Diabetic: Yes      Tobacco smoker: No      Systolic Blood Pressure: 132 mmHg      Is BP treated: Yes      HDL Cholesterol: 70 mg/dL      Total Cholesterol: 139 mg/dL     PLAN  The following are interventions that have been identified:   Patient eligible for 90 day supply of METFORMIN TAB 1000MG and SIMVASTATIN TAB

## 2025-04-02 RX ORDER — SIMVASTATIN 40 MG
40 TABLET ORAL NIGHTLY
Qty: 30 TABLET | Refills: 1 | Status: SHIPPED | OUTPATIENT
Start: 2025-04-02

## 2025-04-02 NOTE — TELEPHONE ENCOUNTER
Last visit: 3/5/25  Last Med refill: 1/31/25  Does patient have enough medication for 72 hours: No:     Next Visit Date:  Future Appointments   Date Time Provider Department Center   5/29/2025 11:00 AM [facility]   10/15/2025  1:00 PM Allan Roberts MD Resp Spec [facility]       Health Maintenance   Topic Date Due    Annual Wellness Visit (Medicare Advantage)  01/01/2025    DTaP/Tdap/Td vaccine (1 - Tdap) 12/18/2025 (Originally 7/3/1964)    Shingles vaccine (1 of 2) 12/18/2025 (Originally 7/3/1995)    COVID-19 Vaccine (3 - 2024-25 season) 12/18/2025 (Originally 9/1/2024)    Respiratory Syncytial Virus (RSV) Pregnant or age 60 yrs+ (1 - 1-dose 75+ series) 12/18/2025 (Originally 7/3/2020)    Lipids  07/18/2025    Diabetic Alb to Cr ratio (uACR) test  12/27/2025    GFR test (Diabetes, CKD 3-4, OR last GFR 15-59)  02/14/2026    Depression Monitoring  03/05/2026    DEXA (modify frequency per FRAX score)  Completed    Flu vaccine  Completed    Pneumococcal 50+ years Vaccine  Completed    Hepatitis C screen  Completed    Hepatitis A vaccine  Aged Out    Hepatitis B vaccine  Aged Out    Hib vaccine  Aged Out    Polio vaccine  Aged Out    Meningococcal (ACWY) vaccine  Aged Out    Meningococcal B vaccine  Aged Out    Diabetic foot exam  Discontinued    A1C test (Diabetic or Prediabetic)  Discontinued    Diabetic retinal exam  Discontinued    Colonoscopy  Discontinued       Hemoglobin A1C (%)   Date Value   04/18/2024 7.1   10/26/2023 7.4   01/31/2023 6.5             ( goal A1C is < 7)   No components found for: \"LABMICR\"  No components found for: \"LDLCHOLESTEROL\", \"LDLCALC\"    (goal LDL is <100)   AST (U/L)   Date Value   02/05/2023 32 (H)     ALT (U/L)   Date Value   02/05/2023 13     BUN (mg/dL)   Date Value   02/14/2025 15     BP Readings from Last 3 Encounters:   02/28/25 132/78   01/30/25 131/82   12/18/24 129/74          (goal 120/80)    [facility]

## 2025-05-14 ENCOUNTER — TELEPHONE (OUTPATIENT)
Dept: PHARMACY | Facility: CLINIC | Age: 80
End: 2025-05-14

## 2025-05-14 NOTE — TELEPHONE ENCOUNTER
Patient/Caregiver: 1 via Telephone  Intervention Detail: Adherence Monitorin  Intervention Accepted By: Patient/Caregiver: 0  Gap Closed?: No   Time Spent (min): 20

## 2025-05-29 ENCOUNTER — PREP FOR PROCEDURE (OUTPATIENT)
Dept: VASCULAR SURGERY | Age: 80
End: 2025-05-29

## 2025-05-29 ENCOUNTER — OFFICE VISIT (OUTPATIENT)
Dept: VASCULAR SURGERY | Age: 80
End: 2025-05-29
Payer: MEDICARE

## 2025-05-29 VITALS
OXYGEN SATURATION: 98 % | HEIGHT: 60 IN | BODY MASS INDEX: 34.16 KG/M2 | HEART RATE: 106 BPM | DIASTOLIC BLOOD PRESSURE: 80 MMHG | SYSTOLIC BLOOD PRESSURE: 130 MMHG | WEIGHT: 174 LBS | RESPIRATION RATE: 18 BRPM

## 2025-05-29 DIAGNOSIS — I65.23 BILATERAL CAROTID ARTERY STENOSIS: Primary | ICD-10-CM

## 2025-05-29 PROBLEM — I65.29 CAROTID ARTERY STENOSIS: Status: ACTIVE | Noted: 2025-05-29

## 2025-05-29 PROCEDURE — 3075F SYST BP GE 130 - 139MM HG: CPT | Performed by: SURGERY

## 2025-05-29 PROCEDURE — 1123F ACP DISCUSS/DSCN MKR DOCD: CPT | Performed by: SURGERY

## 2025-05-29 PROCEDURE — 3079F DIAST BP 80-89 MM HG: CPT | Performed by: SURGERY

## 2025-05-29 PROCEDURE — 1159F MED LIST DOCD IN RCRD: CPT | Performed by: SURGERY

## 2025-05-29 PROCEDURE — 1126F AMNT PAIN NOTED NONE PRSNT: CPT | Performed by: SURGERY

## 2025-05-29 PROCEDURE — 99213 OFFICE O/P EST LOW 20 MIN: CPT | Performed by: SURGERY

## 2025-05-29 ASSESSMENT — ENCOUNTER SYMPTOMS
CHEST TIGHTNESS: 0
COLOR CHANGE: 0
ALLERGIC/IMMUNOLOGIC NEGATIVE: 1
ABDOMINAL PAIN: 0
SHORTNESS OF BREATH: 0
BACK PAIN: 1

## 2025-05-29 NOTE — PROGRESS NOTES
Division of Vascular Surgery        Follow Up      Chief Complaint:      Carotid stenosis    History of Present Illness:      La Collado is a 79 y.o. woman who presents for follow up regarding her carotid disease.  She denies any unilateral weakness, facial droop, thick/slurred speech or symptoms of amaurosis fugax.  She is compliant with her medications and does not smoke.  We had discussed carotid intervention before and she wanted to wait until the summer for it.  She is ready to undergo intervention at this time.       (1/30/25) La Collado is a 79 y.o. woman who presents with asymptomatic bilateral extracranial carotid stenosis.  She denies any episodes of unilateral weakness, facial droop, thick/slurred speech or symptoms of amaurosis fugax to suggest stroke of TIA.  Says they picked up on carotid stenosis on workup for knee surgery she underwent almost 12 years ago.  She has been following Dr. KAYLEIGH Almanzar at [facility], but due to insurance changes is coming to see me.   Over the years her carotid disease on the left has increased, Dr. Almanzar was considering trans carotid artery stent placement and awaiting CT to evaluate.       Patient has a lot of arthritis issues in her hips, back and knees.  Worked as a nurse on our orthopedic floor.  She is a little nervous given her medical issues with diabetes and asthma.  She is compliant with her medications taking aspirin and statins.  She does not smoke.      Medical History:     Past Medical History:   Diagnosis Date    Acquired trigger finger 4/27/2018    ROSELIA (acute kidney injury) 2011    Allergic rhinitis     Anesthesia complication     after or bp dropped kidney labs elevated for few years    Anxiety     Arthritis     Asthma     Carotid stenosis, asymptomatic     16-49%    CKD (chronic kidney disease) stage 3, GFR 30-59 ml/min (HCC)     Diabetes mellitus (HCC)     Dry skin     Dyspnea     Frequency of urination     History of bronchitis     Hyperkalemia

## 2025-06-11 DIAGNOSIS — E11.21 TYPE 2 DIABETES MELLITUS WITH DIABETIC NEPHROPATHY, WITHOUT LONG-TERM CURRENT USE OF INSULIN (HCC): Chronic | ICD-10-CM

## 2025-06-11 DIAGNOSIS — I10 PRIMARY HYPERTENSION: Chronic | ICD-10-CM

## 2025-06-11 DIAGNOSIS — F32.0 CURRENT MILD EPISODE OF MAJOR DEPRESSIVE DISORDER WITHOUT PRIOR EPISODE: Primary | ICD-10-CM

## 2025-06-11 RX ORDER — CITALOPRAM HYDROBROMIDE 40 MG/1
40 TABLET ORAL DAILY
Qty: 30 TABLET | Refills: 2 | Status: SHIPPED | OUTPATIENT
Start: 2025-06-11

## 2025-06-11 RX ORDER — VALSARTAN 160 MG/1
160 TABLET ORAL DAILY
Qty: 30 TABLET | Refills: 2 | Status: SHIPPED | OUTPATIENT
Start: 2025-06-11

## 2025-06-11 RX ORDER — SIMVASTATIN 40 MG
40 TABLET ORAL NIGHTLY
Qty: 30 TABLET | Refills: 2 | Status: SHIPPED | OUTPATIENT
Start: 2025-06-11

## 2025-06-11 NOTE — TELEPHONE ENCOUNTER
01/31/2023 6.5             ( goal A1C is < 7)   No components found for: \"LABMICR\"  No components found for: \"LDLCHOLESTEROL\", \"LDLCALC\"    (goal LDL is <100)   AST (U/L)   Date Value   02/05/2023 32 (H)     ALT (U/L)   Date Value   02/05/2023 13     BUN (mg/dL)   Date Value   02/14/2025 15     BP Readings from Last 3 Encounters:   05/29/25 130/80   02/28/25 132/78   01/30/25 131/82          (goal 120/80)    All Future Testing planned in [facility]  Lab Frequency Next Occurrence   HECTOR DIGITAL SCREEN W OR WO CAD BILATERAL Once 04/18/2024   Basic Metabolic Panel Once 08/04/2025   Creatinine, Random Urine Once 08/04/2025   Protein, urine, random Once 08/04/2025         Patient Active Problem List:     S/P total knee arthroplasty     Type 2 diabetes mellitus with diabetic nephropathy, without long-term current use of insulin ([facility])     Hypertension     Asthma     Arthritis     Acquired trigger finger     Osteoarthritis of knee     Morbid obesity with BMI of 40.0-44.9, adult ([facility])     Current mild episode of major depressive disorder without prior episode     Bilateral carotid artery stenosis     Other specified glaucoma     Chronic systolic (congestive) heart failure     Lumbar radiculopathy     Lumbar degenerative disc disease     Lumbar spondylosis     Chronic renal disease, stage III ([facility]) [[id number]]     Gastrointestinal hemorrhage     Lumbosacral spondylosis without myelopathy     Spondylosis of cervical region without myelopathy or radiculopathy     Carotid artery stenosis

## 2025-06-25 ENCOUNTER — OFFICE VISIT (OUTPATIENT)
Age: 80
End: 2025-06-25
Payer: MEDICARE

## 2025-06-25 VITALS
TEMPERATURE: 97.4 F | WEIGHT: 178.8 LBS | OXYGEN SATURATION: 96 % | DIASTOLIC BLOOD PRESSURE: 70 MMHG | BODY MASS INDEX: 35.1 KG/M2 | HEART RATE: 84 BPM | SYSTOLIC BLOOD PRESSURE: 129 MMHG | HEIGHT: 60 IN

## 2025-06-25 DIAGNOSIS — I65.23 BILATERAL CAROTID ARTERY STENOSIS: ICD-10-CM

## 2025-06-25 DIAGNOSIS — Z00.00 MEDICARE ANNUAL WELLNESS VISIT, SUBSEQUENT: ICD-10-CM

## 2025-06-25 DIAGNOSIS — E11.21 TYPE 2 DIABETES MELLITUS WITH DIABETIC NEPHROPATHY, WITHOUT LONG-TERM CURRENT USE OF INSULIN (HCC): Chronic | ICD-10-CM

## 2025-06-25 DIAGNOSIS — I10 PRIMARY HYPERTENSION: Primary | Chronic | ICD-10-CM

## 2025-06-25 DIAGNOSIS — E55.9 VITAMIN D DEFICIENCY: ICD-10-CM

## 2025-06-25 PROBLEM — K92.2 GASTROINTESTINAL HEMORRHAGE: Status: RESOLVED | Noted: 2023-02-05 | Resolved: 2025-06-25

## 2025-06-25 PROCEDURE — 3074F SYST BP LT 130 MM HG: CPT | Performed by: INTERNAL MEDICINE

## 2025-06-25 PROCEDURE — 1159F MED LIST DOCD IN RCRD: CPT | Performed by: INTERNAL MEDICINE

## 2025-06-25 PROCEDURE — G0439 PPPS, SUBSEQ VISIT: HCPCS | Performed by: INTERNAL MEDICINE

## 2025-06-25 PROCEDURE — 3078F DIAST BP <80 MM HG: CPT | Performed by: INTERNAL MEDICINE

## 2025-06-25 PROCEDURE — 1123F ACP DISCUSS/DSCN MKR DOCD: CPT | Performed by: INTERNAL MEDICINE

## 2025-06-25 ASSESSMENT — PATIENT HEALTH QUESTIONNAIRE - PHQ9
9. THOUGHTS THAT YOU WOULD BE BETTER OFF DEAD, OR OF HURTING YOURSELF: NOT AT ALL
6. FEELING BAD ABOUT YOURSELF - OR THAT YOU ARE A FAILURE OR HAVE LET YOURSELF OR YOUR FAMILY DOWN: NOT AT ALL
SUM OF ALL RESPONSES TO PHQ QUESTIONS 1-9: 2
4. FEELING TIRED OR HAVING LITTLE ENERGY: NOT AT ALL
SUM OF ALL RESPONSES TO PHQ QUESTIONS 1-9: 2
3. TROUBLE FALLING OR STAYING ASLEEP: MORE THAN HALF THE DAYS
7. TROUBLE CONCENTRATING ON THINGS, SUCH AS READING THE NEWSPAPER OR WATCHING TELEVISION: NOT AT ALL
10. IF YOU CHECKED OFF ANY PROBLEMS, HOW DIFFICULT HAVE THESE PROBLEMS MADE IT FOR YOU TO DO YOUR WORK, TAKE CARE OF THINGS AT HOME, OR GET ALONG WITH OTHER PEOPLE: SOMEWHAT DIFFICULT
2. FEELING DOWN, DEPRESSED OR HOPELESS: NOT AT ALL
5. POOR APPETITE OR OVEREATING: NOT AT ALL
1. LITTLE INTEREST OR PLEASURE IN DOING THINGS: NOT AT ALL
SUM OF ALL RESPONSES TO PHQ QUESTIONS 1-9: 2
SUM OF ALL RESPONSES TO PHQ QUESTIONS 1-9: 2
8. MOVING OR SPEAKING SO SLOWLY THAT OTHER PEOPLE COULD HAVE NOTICED. OR THE OPPOSITE, BEING SO FIGETY OR RESTLESS THAT YOU HAVE BEEN MOVING AROUND A LOT MORE THAN USUAL: NOT AT ALL

## 2025-06-25 NOTE — PROGRESS NOTES
Medicare Annual Wellness Visit    La Collado is here for Medicare AWV (Last AWV 7.24.24), Diabetes (Routine 6 month f/u), and Hypertension (Routine 6 month f/u)    Assessment & Plan   Primary hypertension  Type 2 diabetes mellitus with diabetic nephropathy, without long-term current use of insulin ([facility])  -     Lipid Panel; Future  Bilateral carotid artery stenosis  Vitamin D deficiency  -     Vitamin D 25 Hydroxy; Future       No follow-ups on file.     Subjective         Diabetes Mellitus Type 2: Current symptoms/problems include none.    Home blood sugar records: trend: stable  Any episodes of hypoglycemia? no  Eye exam current (within one year): yes  Tobacco history: She  reports that she quit smoking about 27 years ago. Her smoking use included cigarettes. She started smoking about 60 years ago. She has a 33 pack-year smoking history. She has been exposed to tobacco smoke. She has quit using smokeless tobacco.   Daily Aspirin? Yes    Hypertension:  Home blood pressure monitoring: Yes - .  She is adherent to a low sodium diet. Patient denies chest pain and shortness of breath.  Antihypertensive medication side effects: no medication side effects noted.  Use of agents associated with hypertension: none.     Hyperlipidemia:  No new myalgias or GI upset on simvastatin (Zocor).       Lab Results   Component Value Date    LABA1C 7.1 04/18/2024    LABA1C 7.4 10/26/2023    LABA1C 6.5 01/31/2023     Lab Results   Component Value Date    CREATININE 1.0 (H) 02/14/2025     Lab Results   Component Value Date    ALT 13 02/05/2023    AST 32 (H) 02/05/2023     Lab Results   Component Value Date    CHOL 139 07/18/2024    TRIG 96 07/18/2024    HDL 70 07/18/2024        She reports she is feeling well overall. Continues to take Celexa. She is nervous about surgery.  She has known hx of b/l carotid stenosis with 70% stenosis on left. Surgery is scheduled for June 14th.     Patient's complete Health Risk Assessment and screening

## 2025-07-14 ENCOUNTER — ANESTHESIA (OUTPATIENT)
Dept: OPERATING ROOM | Age: 80
DRG: 039 | End: 2025-07-14
Payer: MEDICARE

## 2025-07-14 ENCOUNTER — ANESTHESIA EVENT (OUTPATIENT)
Dept: OPERATING ROOM | Age: 80
DRG: 039 | End: 2025-07-14
Payer: MEDICARE

## 2025-07-14 ENCOUNTER — HOSPITAL ENCOUNTER (INPATIENT)
Age: 80
LOS: 1 days | Discharge: HOME | DRG: 039 | End: 2025-07-15
Attending: SURGERY | Admitting: SURGERY
Payer: MEDICARE

## 2025-07-14 PROBLEM — I65.22 CAROTID STENOSIS, LEFT: Status: ACTIVE | Noted: 2025-07-14

## 2025-07-14 LAB
BUN BLD-MCNC: 16 MG/DL (ref 8–26)
EGFR, POC: 74 ML/MIN/1.73M2
EKG ATRIAL RATE: 86 BPM
EKG P AXIS: 23 DEGREES
EKG P-R INTERVAL: 150 MS
EKG Q-T INTERVAL: 380 MS
EKG QRS DURATION: 88 MS
EKG QTC CALCULATION (BAZETT): 454 MS
EKG R AXIS: -3 DEGREES
EKG T AXIS: 22 DEGREES
EKG VENTRICULAR RATE: 86 BPM
GLUCOSE BLD-MCNC: 206 MG/DL (ref 74–100)
GLUCOSE BLD-MCNC: 305 MG/DL (ref 65–105)
GLUCOSE BLD-MCNC: 357 MG/DL (ref 65–105)
GLUCOSE BLD-MCNC: 382 MG/DL (ref 65–105)
HCT VFR BLD AUTO: 37 % (ref 36–46)
POC CREATININE: 0.8 MG/DL (ref 0.51–1.19)
POC HEMOGLOBIN (CALC): 12.5 G/DL (ref 12–16)
POTASSIUM BLD-SCNC: 3.3 MMOL/L (ref 3.5–4.5)
SODIUM BLD-SCNC: 142 MMOL/L (ref 138–146)

## 2025-07-14 PROCEDURE — 7100000001 HC PACU RECOVERY - ADDTL 15 MIN

## 2025-07-14 PROCEDURE — 6370000000 HC RX 637 (ALT 250 FOR IP)

## 2025-07-14 PROCEDURE — 3700000000 HC ANESTHESIA ATTENDED CARE: Performed by: SURGERY

## 2025-07-14 PROCEDURE — 6360000002 HC RX W HCPCS

## 2025-07-14 PROCEDURE — 3600000004 HC SURGERY LEVEL 4 BASE: Performed by: SURGERY

## 2025-07-14 PROCEDURE — 6370000000 HC RX 637 (ALT 250 FOR IP): Performed by: SURGERY

## 2025-07-14 PROCEDURE — 7100000000 HC PACU RECOVERY - FIRST 15 MIN

## 2025-07-14 PROCEDURE — 82947 ASSAY GLUCOSE BLOOD QUANT: CPT

## 2025-07-14 PROCEDURE — 84520 ASSAY OF UREA NITROGEN: CPT

## 2025-07-14 PROCEDURE — 84132 ASSAY OF SERUM POTASSIUM: CPT

## 2025-07-14 PROCEDURE — 03CJ0ZZ EXTIRPATION OF MATTER FROM LEFT COMMON CAROTID ARTERY, OPEN APPROACH: ICD-10-PCS | Performed by: SURGERY

## 2025-07-14 PROCEDURE — 82565 ASSAY OF CREATININE: CPT

## 2025-07-14 PROCEDURE — 2500000003 HC RX 250 WO HCPCS: Performed by: SURGERY

## 2025-07-14 PROCEDURE — 94761 N-INVAS EAR/PLS OXIMETRY MLT: CPT

## 2025-07-14 PROCEDURE — 2580000003 HC RX 258: Performed by: SURGERY

## 2025-07-14 PROCEDURE — 3700000001 HC ADD 15 MINUTES (ANESTHESIA): Performed by: SURGERY

## 2025-07-14 PROCEDURE — 2700000000 HC OXYGEN THERAPY PER DAY

## 2025-07-14 PROCEDURE — 2500000003 HC RX 250 WO HCPCS

## 2025-07-14 PROCEDURE — 2000000000 HC ICU R&B

## 2025-07-14 PROCEDURE — 93005 ELECTROCARDIOGRAM TRACING: CPT | Performed by: SURGERY

## 2025-07-14 PROCEDURE — 2580000003 HC RX 258

## 2025-07-14 PROCEDURE — C1763 CONN TISS, NON-HUMAN: HCPCS | Performed by: SURGERY

## 2025-07-14 PROCEDURE — 03UJ0KZ SUPPLEMENT LEFT COMMON CAROTID ARTERY WITH NONAUTOLOGOUS TISSUE SUBSTITUTE, OPEN APPROACH: ICD-10-PCS | Performed by: SURGERY

## 2025-07-14 PROCEDURE — 2709999900 HC NON-CHARGEABLE SUPPLY: Performed by: SURGERY

## 2025-07-14 PROCEDURE — 84295 ASSAY OF SERUM SODIUM: CPT

## 2025-07-14 PROCEDURE — 3600000014 HC SURGERY LEVEL 4 ADDTL 15MIN: Performed by: SURGERY

## 2025-07-14 PROCEDURE — 6360000002 HC RX W HCPCS: Performed by: SURGERY

## 2025-07-14 PROCEDURE — 85014 HEMATOCRIT: CPT

## 2025-07-14 DEVICE — DECELLULARIZED BOVINE PERICARDIUM
Type: IMPLANTABLE DEVICE | Site: NECK | Status: FUNCTIONAL
Brand: PHOTOFIX DECELLULARIZED BOVINE PERICARDIUM

## 2025-07-14 DEVICE — IMPLANTABLE DEVICE: Type: IMPLANTABLE DEVICE | Site: CAROTID | Status: FUNCTIONAL

## 2025-07-14 RX ORDER — SODIUM CHLORIDE, SODIUM LACTATE, POTASSIUM CHLORIDE, CALCIUM CHLORIDE 600; 310; 30; 20 MG/100ML; MG/100ML; MG/100ML; MG/100ML
INJECTION, SOLUTION INTRAVENOUS
Status: DISCONTINUED | OUTPATIENT
Start: 2025-07-14 | End: 2025-07-14 | Stop reason: SDUPTHER

## 2025-07-14 RX ORDER — LIDOCAINE HYDROCHLORIDE 40 MG/ML
4 SOLUTION TOPICAL ONCE
Status: DISCONTINUED | OUTPATIENT
Start: 2025-07-14 | End: 2025-07-14

## 2025-07-14 RX ORDER — SODIUM CHLORIDE 0.9 % (FLUSH) 0.9 %
5-40 SYRINGE (ML) INJECTION PRN
Status: DISCONTINUED | OUTPATIENT
Start: 2025-07-14 | End: 2025-07-15 | Stop reason: HOSPADM

## 2025-07-14 RX ORDER — ROCURONIUM BROMIDE 10 MG/ML
INJECTION, SOLUTION INTRAVENOUS
Status: DISCONTINUED | OUTPATIENT
Start: 2025-07-14 | End: 2025-07-14 | Stop reason: SDUPTHER

## 2025-07-14 RX ORDER — INSULIN LISPRO 100 [IU]/ML
0-16 INJECTION, SOLUTION INTRAVENOUS; SUBCUTANEOUS
Status: DISCONTINUED | OUTPATIENT
Start: 2025-07-14 | End: 2025-07-14

## 2025-07-14 RX ORDER — INSULIN LISPRO 100 [IU]/ML
0-18 INJECTION, SOLUTION INTRAVENOUS; SUBCUTANEOUS
Status: DISCONTINUED | OUTPATIENT
Start: 2025-07-15 | End: 2025-07-15 | Stop reason: HOSPADM

## 2025-07-14 RX ORDER — HEPARIN SODIUM 1000 [USP'U]/ML
INJECTION, SOLUTION INTRAVENOUS; SUBCUTANEOUS
Status: DISCONTINUED | OUTPATIENT
Start: 2025-07-14 | End: 2025-07-14 | Stop reason: SDUPTHER

## 2025-07-14 RX ORDER — PROPOFOL 10 MG/ML
INJECTION, EMULSION INTRAVENOUS
Status: COMPLETED
Start: 2025-07-14 | End: 2025-07-14

## 2025-07-14 RX ORDER — LABETALOL HYDROCHLORIDE 5 MG/ML
INJECTION, SOLUTION INTRAVENOUS
Status: DISCONTINUED | OUTPATIENT
Start: 2025-07-14 | End: 2025-07-14 | Stop reason: SDUPTHER

## 2025-07-14 RX ORDER — NICARDIPINE HYDROCHLORIDE 0.1 MG/ML
INJECTION INTRAVENOUS
Status: DISPENSED
Start: 2025-07-14 | End: 2025-07-14

## 2025-07-14 RX ORDER — FENTANYL CITRATE 50 UG/ML
25 INJECTION, SOLUTION INTRAMUSCULAR; INTRAVENOUS EVERY 5 MIN PRN
Status: DISCONTINUED | OUTPATIENT
Start: 2025-07-14 | End: 2025-07-14 | Stop reason: HOSPADM

## 2025-07-14 RX ORDER — ASPIRIN 81 MG/1
81 TABLET ORAL DAILY
Status: DISCONTINUED | OUTPATIENT
Start: 2025-07-14 | End: 2025-07-15 | Stop reason: HOSPADM

## 2025-07-14 RX ORDER — NICARDIPINE HYDROCHLORIDE 0.1 MG/ML
INJECTION INTRAVENOUS
Status: DISCONTINUED
Start: 2025-07-14 | End: 2025-07-14

## 2025-07-14 RX ORDER — ONDANSETRON 2 MG/ML
4 INJECTION INTRAMUSCULAR; INTRAVENOUS
Status: DISCONTINUED | OUTPATIENT
Start: 2025-07-14 | End: 2025-07-14 | Stop reason: HOSPADM

## 2025-07-14 RX ORDER — MAGNESIUM HYDROXIDE 1200 MG/15ML
LIQUID ORAL CONTINUOUS PRN
Status: COMPLETED | OUTPATIENT
Start: 2025-07-14 | End: 2025-07-14

## 2025-07-14 RX ORDER — SODIUM BICARBONATE 650 MG/1
650 TABLET ORAL ONCE
Status: COMPLETED | OUTPATIENT
Start: 2025-07-14 | End: 2025-07-14

## 2025-07-14 RX ORDER — NICARDIPINE HYDROCHLORIDE 2.5 MG/ML
INJECTION INTRAVENOUS
Status: DISCONTINUED
Start: 2025-07-14 | End: 2025-07-15

## 2025-07-14 RX ORDER — DEXAMETHASONE SODIUM PHOSPHATE 10 MG/ML
INJECTION, SOLUTION INTRA-ARTICULAR; INTRALESIONAL; INTRAMUSCULAR; INTRAVENOUS; SOFT TISSUE
Status: DISCONTINUED | OUTPATIENT
Start: 2025-07-14 | End: 2025-07-14 | Stop reason: SDUPTHER

## 2025-07-14 RX ORDER — SODIUM CHLORIDE 0.9 % (FLUSH) 0.9 %
5-40 SYRINGE (ML) INJECTION EVERY 12 HOURS SCHEDULED
Status: DISCONTINUED | OUTPATIENT
Start: 2025-07-14 | End: 2025-07-14 | Stop reason: HOSPADM

## 2025-07-14 RX ORDER — OXYCODONE HYDROCHLORIDE 5 MG/1
2.5 TABLET ORAL EVERY 4 HOURS PRN
Status: DISCONTINUED | OUTPATIENT
Start: 2025-07-14 | End: 2025-07-15 | Stop reason: HOSPADM

## 2025-07-14 RX ORDER — EPHEDRINE SULFATE/0.9% NACL/PF 25 MG/5 ML
SYRINGE (ML) INTRAVENOUS
Status: DISCONTINUED | OUTPATIENT
Start: 2025-07-14 | End: 2025-07-14 | Stop reason: SDUPTHER

## 2025-07-14 RX ORDER — ENOXAPARIN SODIUM 100 MG/ML
40 INJECTION SUBCUTANEOUS DAILY
Status: DISCONTINUED | OUTPATIENT
Start: 2025-07-15 | End: 2025-07-15 | Stop reason: HOSPADM

## 2025-07-14 RX ORDER — ACETAMINOPHEN 500 MG
1000 TABLET ORAL EVERY 8 HOURS SCHEDULED
Status: DISCONTINUED | OUTPATIENT
Start: 2025-07-14 | End: 2025-07-15 | Stop reason: HOSPADM

## 2025-07-14 RX ORDER — CITALOPRAM HYDROBROMIDE 20 MG/1
40 TABLET ORAL DAILY
Status: DISCONTINUED | OUTPATIENT
Start: 2025-07-15 | End: 2025-07-15 | Stop reason: HOSPADM

## 2025-07-14 RX ORDER — SODIUM CHLORIDE 9 MG/ML
INJECTION, SOLUTION INTRAVENOUS CONTINUOUS
Status: DISCONTINUED | OUTPATIENT
Start: 2025-07-14 | End: 2025-07-14 | Stop reason: HOSPADM

## 2025-07-14 RX ORDER — ATORVASTATIN CALCIUM 40 MG/1
40 TABLET, FILM COATED ORAL DAILY
Status: DISCONTINUED | OUTPATIENT
Start: 2025-07-14 | End: 2025-07-15 | Stop reason: HOSPADM

## 2025-07-14 RX ORDER — LIDOCAINE HYDROCHLORIDE 10 MG/ML
INJECTION, SOLUTION INFILTRATION; PERINEURAL PRN
Status: DISCONTINUED | OUTPATIENT
Start: 2025-07-14 | End: 2025-07-14 | Stop reason: HOSPADM

## 2025-07-14 RX ORDER — HYDRALAZINE HYDROCHLORIDE 20 MG/ML
10 INJECTION INTRAMUSCULAR; INTRAVENOUS
Status: DISCONTINUED | OUTPATIENT
Start: 2025-07-14 | End: 2025-07-15 | Stop reason: HOSPADM

## 2025-07-14 RX ORDER — LIDOCAINE HYDROCHLORIDE 40 MG/ML
SOLUTION TOPICAL
Status: DISCONTINUED | OUTPATIENT
Start: 2025-07-14 | End: 2025-07-14 | Stop reason: SDUPTHER

## 2025-07-14 RX ORDER — PHENYLEPHRINE HCL IN 0.9% NACL 1 MG/10 ML
SYRINGE (ML) INTRAVENOUS
Status: DISCONTINUED | OUTPATIENT
Start: 2025-07-14 | End: 2025-07-14 | Stop reason: SDUPTHER

## 2025-07-14 RX ORDER — SODIUM CHLORIDE 9 MG/ML
INJECTION, SOLUTION INTRAVENOUS PRN
Status: DISCONTINUED | OUTPATIENT
Start: 2025-07-14 | End: 2025-07-14 | Stop reason: HOSPADM

## 2025-07-14 RX ORDER — PHENYLEPHRINE HCL IN 0.9% NACL 50MG/250ML
PLASTIC BAG, INJECTION (ML) INTRAVENOUS
Status: DISCONTINUED
Start: 2025-07-14 | End: 2025-07-14

## 2025-07-14 RX ORDER — SODIUM CHLORIDE 0.9 % (FLUSH) 0.9 %
5-40 SYRINGE (ML) INJECTION PRN
Status: DISCONTINUED | OUTPATIENT
Start: 2025-07-14 | End: 2025-07-14 | Stop reason: HOSPADM

## 2025-07-14 RX ORDER — VALSARTAN 160 MG/1
160 TABLET ORAL DAILY
Status: DISCONTINUED | OUTPATIENT
Start: 2025-07-14 | End: 2025-07-15 | Stop reason: HOSPADM

## 2025-07-14 RX ORDER — FLUTICASONE PROPIONATE 110 UG/1
1 AEROSOL, METERED RESPIRATORY (INHALATION)
Status: DISCONTINUED | OUTPATIENT
Start: 2025-07-14 | End: 2025-07-15 | Stop reason: HOSPADM

## 2025-07-14 RX ORDER — ONDANSETRON 2 MG/ML
INJECTION INTRAMUSCULAR; INTRAVENOUS
Status: DISCONTINUED | OUTPATIENT
Start: 2025-07-14 | End: 2025-07-14 | Stop reason: SDUPTHER

## 2025-07-14 RX ORDER — SODIUM CHLORIDE 0.9 % (FLUSH) 0.9 %
5-40 SYRINGE (ML) INJECTION EVERY 12 HOURS SCHEDULED
Status: DISCONTINUED | OUTPATIENT
Start: 2025-07-14 | End: 2025-07-15 | Stop reason: HOSPADM

## 2025-07-14 RX ORDER — PHENYLEPHRINE HCL IN 0.9% NACL 1 MG/10 ML
SYRINGE (ML) INTRAVENOUS
Status: COMPLETED
Start: 2025-07-14 | End: 2025-07-14

## 2025-07-14 RX ORDER — ONDANSETRON 2 MG/ML
4 INJECTION INTRAMUSCULAR; INTRAVENOUS EVERY 6 HOURS PRN
Status: DISCONTINUED | OUTPATIENT
Start: 2025-07-14 | End: 2025-07-15 | Stop reason: HOSPADM

## 2025-07-14 RX ORDER — GLUCAGON 1 MG/ML
1 KIT INJECTION PRN
Status: DISCONTINUED | OUTPATIENT
Start: 2025-07-14 | End: 2025-07-15 | Stop reason: HOSPADM

## 2025-07-14 RX ORDER — MEPERIDINE HYDROCHLORIDE 50 MG/ML
12.5 INJECTION INTRAMUSCULAR; INTRAVENOUS; SUBCUTANEOUS EVERY 5 MIN PRN
Status: DISCONTINUED | OUTPATIENT
Start: 2025-07-14 | End: 2025-07-14 | Stop reason: HOSPADM

## 2025-07-14 RX ORDER — PROPOFOL 10 MG/ML
INJECTION, EMULSION INTRAVENOUS
Status: DISCONTINUED | OUTPATIENT
Start: 2025-07-14 | End: 2025-07-14 | Stop reason: SDUPTHER

## 2025-07-14 RX ORDER — DEXTROSE MONOHYDRATE 100 MG/ML
INJECTION, SOLUTION INTRAVENOUS CONTINUOUS PRN
Status: DISCONTINUED | OUTPATIENT
Start: 2025-07-14 | End: 2025-07-15 | Stop reason: HOSPADM

## 2025-07-14 RX ORDER — INSULIN LISPRO 100 [IU]/ML
22 INJECTION, SOLUTION INTRAVENOUS; SUBCUTANEOUS ONCE
Status: COMPLETED | OUTPATIENT
Start: 2025-07-14 | End: 2025-07-14

## 2025-07-14 RX ORDER — LABETALOL HYDROCHLORIDE 5 MG/ML
10 INJECTION, SOLUTION INTRAVENOUS
Status: DISCONTINUED | OUTPATIENT
Start: 2025-07-14 | End: 2025-07-15 | Stop reason: HOSPADM

## 2025-07-14 RX ORDER — SODIUM CHLORIDE 9 MG/ML
INJECTION, SOLUTION INTRAVENOUS PRN
Status: DISCONTINUED | OUTPATIENT
Start: 2025-07-14 | End: 2025-07-15 | Stop reason: HOSPADM

## 2025-07-14 RX ORDER — FENTANYL CITRATE 50 UG/ML
INJECTION, SOLUTION INTRAMUSCULAR; INTRAVENOUS
Status: DISCONTINUED | OUTPATIENT
Start: 2025-07-14 | End: 2025-07-14 | Stop reason: SDUPTHER

## 2025-07-14 RX ORDER — LIDOCAINE HYDROCHLORIDE 10 MG/ML
INJECTION, SOLUTION EPIDURAL; INFILTRATION; INTRACAUDAL; PERINEURAL
Status: DISCONTINUED | OUTPATIENT
Start: 2025-07-14 | End: 2025-07-14 | Stop reason: SDUPTHER

## 2025-07-14 RX ORDER — FENTANYL CITRATE 50 UG/ML
50 INJECTION, SOLUTION INTRAMUSCULAR; INTRAVENOUS EVERY 5 MIN PRN
Status: DISCONTINUED | OUTPATIENT
Start: 2025-07-14 | End: 2025-07-14 | Stop reason: HOSPADM

## 2025-07-14 RX ADMIN — ROCURONIUM BROMIDE 20 MG: 10 INJECTION, SOLUTION INTRAVENOUS at 09:58

## 2025-07-14 RX ADMIN — Medication 2.5 MG: at 11:09

## 2025-07-14 RX ADMIN — DEXAMETHASONE SODIUM PHOSPHATE 4 MG: 10 INJECTION INTRAMUSCULAR; INTRAVENOUS at 09:19

## 2025-07-14 RX ADMIN — ACETAMINOPHEN 1000 MG: 500 TABLET ORAL at 14:49

## 2025-07-14 RX ADMIN — PROPOFOL 40 MG: 10 INJECTION, EMULSION INTRAVENOUS at 08:55

## 2025-07-14 RX ADMIN — Medication 50 MCG: at 10:51

## 2025-07-14 RX ADMIN — SODIUM CHLORIDE 7 MG/HR: 0.9 INJECTION, SOLUTION INTRAVENOUS at 16:41

## 2025-07-14 RX ADMIN — PROPOFOL 40 MG: 10 INJECTION, EMULSION INTRAVENOUS at 09:19

## 2025-07-14 RX ADMIN — PROPOFOL 30 MG: 10 INJECTION, EMULSION INTRAVENOUS at 10:36

## 2025-07-14 RX ADMIN — VALSARTAN 160 MG: 160 TABLET, FILM COATED ORAL at 14:49

## 2025-07-14 RX ADMIN — SODIUM BICARBONATE 650 MG: 650 TABLET ORAL at 14:49

## 2025-07-14 RX ADMIN — SUGAMMADEX 200 MG: 100 INJECTION, SOLUTION INTRAVENOUS at 10:59

## 2025-07-14 RX ADMIN — CEFAZOLIN 2000 MG: 1 INJECTION, POWDER, FOR SOLUTION INTRAMUSCULAR; INTRAVENOUS at 09:07

## 2025-07-14 RX ADMIN — SODIUM CHLORIDE, POTASSIUM CHLORIDE, SODIUM LACTATE AND CALCIUM CHLORIDE: 600; 310; 30; 20 INJECTION, SOLUTION INTRAVENOUS at 08:50

## 2025-07-14 RX ADMIN — ATORVASTATIN CALCIUM 40 MG: 40 TABLET, FILM COATED ORAL at 21:25

## 2025-07-14 RX ADMIN — ROCURONIUM BROMIDE 10 MG: 10 INJECTION, SOLUTION INTRAVENOUS at 10:17

## 2025-07-14 RX ADMIN — Medication 5 MG: at 11:14

## 2025-07-14 RX ADMIN — LIDOCAINE HYDROCHLORIDE 4 ML: 40 SOLUTION TOPICAL at 08:53

## 2025-07-14 RX ADMIN — FENTANYL CITRATE 25 MCG: 50 INJECTION, SOLUTION INTRAMUSCULAR; INTRAVENOUS at 11:23

## 2025-07-14 RX ADMIN — FENTANYL CITRATE 25 MCG: 50 INJECTION, SOLUTION INTRAMUSCULAR; INTRAVENOUS at 10:10

## 2025-07-14 RX ADMIN — INSULIN LISPRO 16 UNITS: 100 INJECTION, SOLUTION INTRAVENOUS; SUBCUTANEOUS at 21:52

## 2025-07-14 RX ADMIN — ROCURONIUM BROMIDE 20 MG: 10 INJECTION, SOLUTION INTRAVENOUS at 09:30

## 2025-07-14 RX ADMIN — Medication 100 MCG: at 09:02

## 2025-07-14 RX ADMIN — FENTANYL CITRATE 25 MCG: 50 INJECTION, SOLUTION INTRAMUSCULAR; INTRAVENOUS at 09:19

## 2025-07-14 RX ADMIN — EPHEDRINE SULFATE 10 MG: 5 INJECTION INTRAVENOUS at 09:08

## 2025-07-14 RX ADMIN — Medication 100 MCG: at 09:40

## 2025-07-14 RX ADMIN — SODIUM CHLORIDE 5 MG/HR: 0.9 INJECTION, SOLUTION INTRAVENOUS at 12:10

## 2025-07-14 RX ADMIN — SODIUM CHLORIDE, SODIUM LACTATE, POTASSIUM CHLORIDE, CALCIUM CHLORIDE: 600; 310; 30; 20 INJECTION, SOLUTION INTRAVENOUS at 09:11

## 2025-07-14 RX ADMIN — PROPOFOL 30 MG: 10 INJECTION, EMULSION INTRAVENOUS at 11:05

## 2025-07-14 RX ADMIN — Medication 5 MG: at 10:37

## 2025-07-14 RX ADMIN — ACETAMINOPHEN 1000 MG: 500 TABLET ORAL at 21:25

## 2025-07-14 RX ADMIN — PROPOFOL 20 MG: 10 INJECTION, EMULSION INTRAVENOUS at 10:57

## 2025-07-14 RX ADMIN — Medication 100 MCG: at 09:38

## 2025-07-14 RX ADMIN — PROPOFOL 140 MG: 10 INJECTION, EMULSION INTRAVENOUS at 08:50

## 2025-07-14 RX ADMIN — INSULIN LISPRO 22 UNITS: 100 INJECTION, SOLUTION INTRAVENOUS; SUBCUTANEOUS at 23:15

## 2025-07-14 RX ADMIN — ROCURONIUM BROMIDE 50 MG: 10 INJECTION, SOLUTION INTRAVENOUS at 08:50

## 2025-07-14 RX ADMIN — PROPOFOL 20 MG: 10 INJECTION, EMULSION INTRAVENOUS at 09:23

## 2025-07-14 RX ADMIN — Medication 50 MCG: at 10:22

## 2025-07-14 RX ADMIN — PROPOFOL 30 MG: 10 INJECTION, EMULSION INTRAVENOUS at 10:00

## 2025-07-14 RX ADMIN — Medication 2.5 MG: at 11:05

## 2025-07-14 RX ADMIN — Medication 150 MCG: at 09:57

## 2025-07-14 RX ADMIN — ONDANSETRON 4 MG: 2 INJECTION, SOLUTION INTRAMUSCULAR; INTRAVENOUS at 10:54

## 2025-07-14 RX ADMIN — PROPOFOL 30 MG: 10 INJECTION, EMULSION INTRAVENOUS at 10:16

## 2025-07-14 RX ADMIN — Medication 200 MCG: at 08:59

## 2025-07-14 RX ADMIN — SODIUM CHLORIDE: 0.9 INJECTION, SOLUTION INTRAVENOUS at 07:47

## 2025-07-14 RX ADMIN — PROPOFOL 20 MG: 10 INJECTION, EMULSION INTRAVENOUS at 08:54

## 2025-07-14 RX ADMIN — Medication 100 MCG: at 10:44

## 2025-07-14 RX ADMIN — PROPOFOL 10 MG: 10 INJECTION, EMULSION INTRAVENOUS at 10:47

## 2025-07-14 RX ADMIN — PROPOFOL 20 MG: 10 INJECTION, EMULSION INTRAVENOUS at 10:46

## 2025-07-14 RX ADMIN — LIDOCAINE HYDROCHLORIDE 50 MG: 10 SOLUTION INTRAVENOUS at 08:50

## 2025-07-14 RX ADMIN — FENTANYL CITRATE 100 MCG: 50 INJECTION, SOLUTION INTRAMUSCULAR; INTRAVENOUS at 08:50

## 2025-07-14 RX ADMIN — FENTANYL CITRATE 25 MCG: 50 INJECTION, SOLUTION INTRAMUSCULAR; INTRAVENOUS at 10:54

## 2025-07-14 RX ADMIN — PHENYLEPHRINE HYDROCHLORIDE 50 MCG/MIN: 10 INJECTION INTRAVENOUS at 09:11

## 2025-07-14 RX ADMIN — HEPARIN SODIUM 9000 UNITS: 1000 INJECTION INTRAVENOUS; SUBCUTANEOUS at 09:47

## 2025-07-14 RX ADMIN — PROPOFOL 40 MG: 10 INJECTION, EMULSION INTRAVENOUS at 09:41

## 2025-07-14 RX ADMIN — PROPOFOL 30 MG: 10 INJECTION, EMULSION INTRAVENOUS at 10:10

## 2025-07-14 RX ADMIN — Medication 5 MG: at 10:42

## 2025-07-14 RX ADMIN — PROPOFOL 20 MG: 10 INJECTION, EMULSION INTRAVENOUS at 10:59

## 2025-07-14 RX ADMIN — PROPOFOL 25 MCG/KG/MIN: 10 INJECTION, EMULSION INTRAVENOUS at 09:12

## 2025-07-14 ASSESSMENT — PAIN DESCRIPTION - LOCATION: LOCATION: OTHER (COMMENT);HIP

## 2025-07-14 ASSESSMENT — ENCOUNTER SYMPTOMS
CHEST TIGHTNESS: 0
SHORTNESS OF BREATH: 0
SHORTNESS OF BREATH: 1

## 2025-07-14 ASSESSMENT — PAIN SCALES - GENERAL
PAINLEVEL_OUTOF10: 4
PAINLEVEL_OUTOF10: 4

## 2025-07-14 ASSESSMENT — PAIN DESCRIPTION - FREQUENCY: FREQUENCY: CONTINUOUS

## 2025-07-14 ASSESSMENT — PAIN DESCRIPTION - DESCRIPTORS: DESCRIPTORS: STABBING

## 2025-07-14 NOTE — ANESTHESIA PRE PROCEDURE
Department of Anesthesiology  Preprocedure Note       Name:  La Collado   Age:  80 y.o.  :  1945                                          MRN:  [id number]         Date:  2025      Surgeon: Surgeon(s):  Chris Haley MD    Procedure: Procedure(s):  CAROTID ENDARTERECTOMY, SSEP MONITORING ([facility]# [id number])    Medications prior to admission:   Prior to Admission medications    Medication Sig Start Date End Date Taking? Authorizing Provider   simvastatin (ZOCOR) 40 MG tablet Take 1 tablet by mouth nightly 25   Jeri Parkinson MD   citalopram (CELEXA) 40 MG tablet Take 1 tablet by mouth daily 25   Jeri Parkinson MD   metFORMIN (GLUCOPHAGE) 1000 MG tablet Take 1 tablet by mouth daily 25   Jeri Parkinson MD   valsartan (DIOVAN) 160 MG tablet Take 1 tablet by mouth daily 25   Jeri Parkinson MD   empagliflozin (JARDIANCE) 10 MG tablet Take 1 tablet by mouth daily 24   Jeri Parkinson MD   fluticasone (ARNUITY ELLIPTA) 100 MCG/ACT AEPB Inhale 1 puff into the lungs in the morning and at bedtime 12/3/24   Tyler Grullon MD   albuterol sulfate HFA (PROVENTIL;VENTOLIN;PROAIR) 108 (90 Base) MCG/ACT inhaler Inhale 2 puffs into the lungs every 6 hours as needed for Wheezing or Shortness of Breath 10/15/24   Allan Roberts MD   sodium bicarbonate 325 MG tablet Take 2 tablets by mouth once    Marianna Baca MD   vitamin D (CHOLECALCIFEROL) 64040 UNIT CAPS Take 1 capsule by mouth once a week for 16 doses  Patient not taking: Reported on 3/5/2025 6/1/23 9/15/23  Seun Pink MD   fluticasone (FLONASE) 50 MCG/ACT nasal spray 2 sprays by Nasal route daily 23   Allan Roberts MD   Latanoprost 0.005 % EMUL latanoprost 0.005 % eye drops    Marianna Baca MD   acetaminophen (TYLENOL) 500 MG tablet Take 1 tablet by mouth    Marianna Baca MD   aspirin 81 MG tablet Take 1 tablet by mouth daily    Provider, MD Marianna       Current medications:    Current

## 2025-07-14 NOTE — PLAN OF CARE
Problem: Discharge Planning  Goal: Discharge to home or other facility with appropriate resources  Outcome: Progressing  Flowsheets (Taken 7/14/2025 1200)  Discharge to home or other facility with appropriate resources:   Identify barriers to discharge with patient and caregiver   Arrange for needed discharge resources and transportation as appropriate   Identify discharge learning needs (meds, wound care, etc)   Refer to discharge planning if patient needs post-hospital services based on physician order or complex needs related to functional status, cognitive ability or social support system     Problem: Chronic Conditions and Co-morbidities  Goal: Patient's chronic conditions and co-morbidity symptoms are monitored and maintained or improved  Outcome: Progressing  Flowsheets (Taken 7/14/2025 1200)  Care Plan - Patient's Chronic Conditions and Co-Morbidity Symptoms are Monitored and Maintained or Improved:   Monitor and assess patient's chronic conditions and comorbid symptoms for stability, deterioration, or improvement   Collaborate with multidisciplinary team to address chronic and comorbid conditions and prevent exacerbation or deterioration   Update acute care plan with appropriate goals if chronic or comorbid symptoms are exacerbated and prevent overall improvement and discharge     Problem: Safety - Adult  Goal: Free from fall injury  Outcome: Progressing  Flowsheets (Taken 7/14/2025 1200)  Free From Fall Injury: Instruct family/caregiver on patient safety     Problem: Pain  Goal: Verbalizes/displays adequate comfort level or baseline comfort level  Outcome: Progressing  Flowsheets (Taken 7/14/2025 1200)  Verbalizes/displays adequate comfort level or baseline comfort level: Encourage patient to monitor pain and request assistance

## 2025-07-14 NOTE — PROGRESS NOTES
[facility]  PROGRESS NOTE    Shift date: 7/14/2025  Shift day: Monday   Shift # 2    Room # 1012/1012-01   Name: La Collado                Amish: Religion   Place of Zoroastrian: unknown    Referral: Routine Visit    Admit Date & Time: 7/14/2025  6:35 AM    Assessment:  La Collado is a 80 y.o. female in the hospital because of Carotid artery stenosis. Upon entering the room writer observes pt is awake and receptive to visit.     Intervention:  Writer introduced self and title as     inquired how pt was feeling. Pt reported feeling good after surgery. Pt shared a little about reasons for surgery. Pt's family members have visited and more will come later today.  inquired about jarrett background. Pt is Religion. Pt welcomed prayer.     Outcome:  Pt expressed gratitude for visit and prayer.     Plan:  Chaplains will remain available to offer spiritual and emotional support as needed.      Electronically signed by Kelvin Dent, Intern, on 7/14/2025 at 3:50 PM.  [facility]  [phone]

## 2025-07-14 NOTE — ANESTHESIA PRE PROCEDURE
Department of Anesthesiology  Preprocedure Note       Name:  La Collado   Age:  80 y.o.  :  1945                                          MRN:  [id number]         Date:  2025      Surgeon: Surgeon(s):  Chris Haley MD    Procedure: Procedure(s):  CAROTID ENDARTERECTOMY, SSEP MONITORING ([facility]# [id number])    Medications prior to admission:   Prior to Admission medications    Medication Sig Start Date End Date Taking? Authorizing Provider   simvastatin (ZOCOR) 40 MG tablet Take 1 tablet by mouth nightly 25   Jeri Parkinson MD   citalopram (CELEXA) 40 MG tablet Take 1 tablet by mouth daily 25   Jeri Parkinson MD   metFORMIN (GLUCOPHAGE) 1000 MG tablet Take 1 tablet by mouth daily 25   Jeri Parkinson MD   valsartan (DIOVAN) 160 MG tablet Take 1 tablet by mouth daily 25   Jeri Parkinson MD   empagliflozin (JARDIANCE) 10 MG tablet Take 1 tablet by mouth daily 24   Jeri Parkinson MD   fluticasone (ARNUITY ELLIPTA) 100 MCG/ACT AEPB Inhale 1 puff into the lungs in the morning and at bedtime 12/3/24   Tyler Grullon MD   albuterol sulfate HFA (PROVENTIL;VENTOLIN;PROAIR) 108 (90 Base) MCG/ACT inhaler Inhale 2 puffs into the lungs every 6 hours as needed for Wheezing or Shortness of Breath 10/15/24   Allan Roberts MD   sodium bicarbonate 325 MG tablet Take 2 tablets by mouth once    Marianna Baca MD   vitamin D (CHOLECALCIFEROL) 86943 UNIT CAPS Take 1 capsule by mouth once a week for 16 doses  Patient not taking: Reported on 3/5/2025 6/1/23 9/15/23  Seun Pink MD   fluticasone (FLONASE) 50 MCG/ACT nasal spray 2 sprays by Nasal route daily 23   Allan Roberts MD   Latanoprost 0.005 % EMUL latanoprost 0.005 % eye drops    Marianna Baca MD   acetaminophen (TYLENOL) 500 MG tablet Take 1 tablet by mouth    Marianna Baca MD   aspirin 81 MG tablet Take 1 tablet by mouth daily    Provider, MD Marianna       Current medications:    Current

## 2025-07-14 NOTE — ANESTHESIA POSTPROCEDURE EVALUATION
Department of Anesthesiology  Postprocedure Note    Patient: La Collado  MRN: [id number]  YOB: 1945  Date of evaluation: 7/14/2025    Procedure Summary       Date: 07/14/25 Room / Location: [place] [address] / [facility]    Anesthesia Start: 0843 Anesthesia Stop: 1152    Procedure: LEFT CAROTID ENDARTERECTOMY, PHOTOFIX BOVINE 0.8CM X 8CM PATCH, SSEP MONITORING ([facility]# [id number]) (Left: Neck) Diagnosis:       Carotid artery stenosis      (Carotid artery stenosis [I65.29])    Surgeons: Chris Haley MD Responsible Provider: Olinda Cox MD    Anesthesia Type: general ASA Status: 3            Anesthesia Type: No value filed.    Katina Phase I: Katina Score: 10    Katina Phase II:      Anesthesia Post Evaluation    Patient location during evaluation: ICU  Patient participation: complete - patient participated  Level of consciousness: awake and alert  Pain score: 0  Airway patency: patent  Nausea & Vomiting: no vomiting and no nausea  Cardiovascular status: hemodynamically stable  Respiratory status: acceptable  Hydration status: stable  Pain management: adequate    No notable events documented.

## 2025-07-14 NOTE — H&P
Division of Vascular Surgery        History & Physical      Chief Complaint:      Left carotid stenosis    History of Present Illness:      La Collado is a 80 y.o. woman who presents with bilateral carotid disease. No unilateral weakness, facial droop, slurred speech, or symptoms of amaurosis fugax. We are planning for left carotid endarterectomy today. She has been NPO since midnight. Not on anticoagulation.    Medical History:     Past Medical History:   Diagnosis Date    Acquired trigger finger 4/27/2018    ROSELIA (acute kidney injury) 2011    Allergic rhinitis     Anesthesia complication     after or bp dropped kidney labs elevated for few years    Anxiety     Arthritis     Asthma     Carotid stenosis, asymptomatic     16-49%    CKD (chronic kidney disease) stage 3, GFR 30-59 ml/min ([facility])     Diabetes mellitus ([facility])     Dry skin     Dyspnea     Frequency of urination     History of bronchitis     Hyperkalemia 1/24/2014    Hypertension     Knee pain, left     Obesity     Obsessive compulsive disorder     Osteoarthritis of knee 4/27/2018    Post-nasal drip     S/P total knee arthroplasty 1/24/2014    Sleep apnea     Urgency incontinence     Urinary, incontinence, stress female     Wears glasses     Wears partial dentures     upper       Surgical History:     Past Surgical History:   Procedure Laterality Date    BACK SURGERY      CARPAL TUNNEL RELEASE Bilateral     10 yrs ago    CATARACT REMOVAL WITH IMPLANT Bilateral     COLONOSCOPY  07/01/2021    COLONOSCOPY POLYPECTOMY HOT BIOPSY performed by Eric Mccormack MD at [facility]    COLONOSCOPY  07/01/2021    COLONOSCOPY W/ ENDOSCOPIC MUCOSAL RESECTION performed by Eric Mccormack MD at [facility]    COLONOSCOPY  07/01/2021    COLONOSCOPY WITH BIOPSY performed by Eric Mccormack MD at [facility]    DILATION AND CURETTAGE OF UTERUS      ESOPHAGOGASTRODUODENOSCOPY  02/06/2023    EYE SURGERY      JOINT REPLACEMENT      LUMBAR

## 2025-07-14 NOTE — OP NOTE
Operative Note      Patient: La Collado  YOB: 1945  MRN: [id number]    Date of Procedure: 7/14/2025    Pre-Op Diagnosis Codes:      * Carotid artery stenosis [I65.29]    Post-Op Diagnosis: Left carotid artery stenosis       Procedure:  Left carotid endarterectomy with patch angioplasty, SSEP neuro monitoring    Surgeon(s):  Chris Haley MD    Assistant: Dr. KAYLEIGH Hogue    Anesthesia: General    Estimated Blood Loss (mL): 47 ml    Complications: None    Specimens: calcific plaque of the carotid artery      Implants:  Implant Name Type Inv. Item Serial No.  Lot No. LRB No. Used Action   GRAFT BIO TISS W0.8XL8CM DECELLULARIZED BOV PERICARD [facility] - [id number]  GRAFT BIO TISS W0.8XL8CM DECELLULARIZED BOV PERICARD PTC  [employer] [id number] Left 1 Implanted   CLIP INT WECK SM WIDE RED TI TRNSVRS GRV CHEVRON SHP W/ PERCIS TIP 6 PER PK - [id number]  CLIP INT WECK SM WIDE RED TI TRNSVRS GRV CHEVRON SHP W/ PERCIS TIP 6 PER PK  [employer]  Left 1 Implanted       Drains: none    Findings:  Present At Time Of Surgery (PATOS) (choose all levels that have infection present):  No infection present  Other Findings: No EEG changes during 41 minute clamp time, no loose debris or flap noted on duplex, velocity profile in the 40s at completion.  Neurologically intact moving all four extremities at completion.    Detailed Description of Procedure:     La Collado was brought to the operating room for left carotid endarterectomy.  After appropriate anesthesia delivered, appropriate lines placed and SSEP neuro monitoring set up.  Patient positioned with head up and legs slightly down position.  The carotid bifurcation was marked with ultrasound and the neck and chest prepped and draped in standard sterile fashion.  Timeout performed and agreed upon, antibiotics given during this period.  I began by making a small oblique incision along the border of the sternocleidomastoid muscle along a skin

## 2025-07-15 VITALS
BODY MASS INDEX: 35.53 KG/M2 | HEART RATE: 96 BPM | RESPIRATION RATE: 16 BRPM | WEIGHT: 181 LBS | SYSTOLIC BLOOD PRESSURE: 99 MMHG | TEMPERATURE: 98.2 F | HEIGHT: 60 IN | OXYGEN SATURATION: 97 % | DIASTOLIC BLOOD PRESSURE: 78 MMHG

## 2025-07-15 LAB
EST. AVERAGE GLUCOSE BLD GHB EST-MCNC: 212 MG/DL
GLUCOSE BLD-MCNC: 134 MG/DL (ref 65–105)
GLUCOSE BLD-MCNC: 147 MG/DL (ref 65–105)
GLUCOSE BLD-MCNC: 153 MG/DL (ref 65–105)
GLUCOSE BLD-MCNC: 180 MG/DL (ref 65–105)
GLUCOSE BLD-MCNC: 213 MG/DL (ref 65–105)
GLUCOSE BLD-MCNC: 232 MG/DL (ref 65–105)
GLUCOSE BLD-MCNC: 267 MG/DL (ref 65–105)
GLUCOSE BLD-MCNC: 306 MG/DL (ref 65–105)
HBA1C MFR BLD: 9 % (ref 4–6)

## 2025-07-15 PROCEDURE — 2580000003 HC RX 258

## 2025-07-15 PROCEDURE — 99024 POSTOP FOLLOW-UP VISIT: CPT

## 2025-07-15 PROCEDURE — 6370000000 HC RX 637 (ALT 250 FOR IP)

## 2025-07-15 PROCEDURE — 83036 HEMOGLOBIN GLYCOSYLATED A1C: CPT

## 2025-07-15 PROCEDURE — 82947 ASSAY GLUCOSE BLOOD QUANT: CPT

## 2025-07-15 PROCEDURE — 6360000002 HC RX W HCPCS

## 2025-07-15 RX ORDER — GLUCAGON 1 MG/ML
1 KIT INJECTION PRN
Status: DISCONTINUED | OUTPATIENT
Start: 2025-07-15 | End: 2025-07-15 | Stop reason: HOSPADM

## 2025-07-15 RX ORDER — DEXTROSE MONOHYDRATE 100 MG/ML
INJECTION, SOLUTION INTRAVENOUS CONTINUOUS PRN
Status: DISCONTINUED | OUTPATIENT
Start: 2025-07-15 | End: 2025-07-15 | Stop reason: HOSPADM

## 2025-07-15 RX ADMIN — Medication 10 MG: at 03:51

## 2025-07-15 RX ADMIN — CITALOPRAM HYDROBROMIDE 40 MG: 20 TABLET ORAL at 08:13

## 2025-07-15 RX ADMIN — VALSARTAN 160 MG: 160 TABLET, FILM COATED ORAL at 08:13

## 2025-07-15 RX ADMIN — EMPAGLIFLOZIN 10 MG: 10 TABLET, FILM COATED ORAL at 08:13

## 2025-07-15 RX ADMIN — Medication 10 MG: at 01:24

## 2025-07-15 RX ADMIN — ENOXAPARIN SODIUM 40 MG: 100 INJECTION SUBCUTANEOUS at 08:14

## 2025-07-15 RX ADMIN — ACETAMINOPHEN 1000 MG: 500 TABLET ORAL at 06:17

## 2025-07-15 RX ADMIN — ASPIRIN 81 MG: 81 TABLET, COATED ORAL at 08:13

## 2025-07-15 RX ADMIN — SODIUM CHLORIDE 4.1 UNITS/HR: 9 INJECTION, SOLUTION INTRAVENOUS at 00:33

## 2025-07-15 RX ADMIN — METFORMIN HYDROCHLORIDE 1000 MG: 500 TABLET ORAL at 08:13

## 2025-07-15 ASSESSMENT — PAIN DESCRIPTION - LOCATION: LOCATION: OTHER (COMMENT)

## 2025-07-15 ASSESSMENT — PAIN SCALES - GENERAL
PAINLEVEL_OUTOF10: 2
PAINLEVEL_OUTOF10: 3

## 2025-07-15 NOTE — PLAN OF CARE
Problem: Discharge Planning  Goal: Discharge to home or other facility with appropriate resources  7/15/2025 0447 by Aria Lopez, RN  Outcome: Progressing  Flowsheets (Taken 7/14/2025 2000)  Discharge to home or other facility with appropriate resources:   Identify barriers to discharge with patient and caregiver   Arrange for needed discharge resources and transportation as appropriate   Identify discharge learning needs (meds, wound care, etc)   Refer to discharge planning if patient needs post-hospital services based on physician order or complex needs related to functional status, cognitive ability or social support system  7/14/2025 1849 by Zaria Velez RN  Outcome: Progressing  Flowsheets (Taken 7/14/2025 1200)  Discharge to home or other facility with appropriate resources:   Identify barriers to discharge with patient and caregiver   Arrange for needed discharge resources and transportation as appropriate   Identify discharge learning needs (meds, wound care, etc)   Refer to discharge planning if patient needs post-hospital services based on physician order or complex needs related to functional status, cognitive ability or social support system     Problem: Chronic Conditions and Co-morbidities  Goal: Patient's chronic conditions and co-morbidity symptoms are monitored and maintained or improved  7/15/2025 0447 by Aria Lopez, RN  Outcome: Progressing  Flowsheets (Taken 7/14/2025 2000)  Care Plan - Patient's Chronic Conditions and Co-Morbidity Symptoms are Monitored and Maintained or Improved:   Monitor and assess patient's chronic conditions and comorbid symptoms for stability, deterioration, or improvement   Collaborate with multidisciplinary team to address chronic and comorbid conditions and prevent exacerbation or deterioration   Update acute care plan with appropriate goals if chronic or comorbid symptoms are exacerbated and prevent overall improvement and discharge  7/14/2025 1849 by Zaria Velez

## 2025-07-15 NOTE — PLAN OF CARE
Problem: Discharge Planning  Goal: Discharge to home or other facility with appropriate resources  7/15/2025 1218 by Zaria Velez RN  Outcome: Completed  Flowsheets (Taken 7/15/2025 0800)  Discharge to home or other facility with appropriate resources:   Identify barriers to discharge with patient and caregiver   Arrange for needed discharge resources and transportation as appropriate   Identify discharge learning needs (meds, wound care, etc)   Refer to discharge planning if patient needs post-hospital services based on physician order or complex needs related to functional status, cognitive ability or social support system  7/15/2025 0447 by Aria Lopez, RN  Outcome: Progressing  Flowsheets (Taken 7/14/2025 2000)  Discharge to home or other facility with appropriate resources:   Identify barriers to discharge with patient and caregiver   Arrange for needed discharge resources and transportation as appropriate   Identify discharge learning needs (meds, wound care, etc)   Refer to discharge planning if patient needs post-hospital services based on physician order or complex needs related to functional status, cognitive ability or social support system     Problem: Chronic Conditions and Co-morbidities  Goal: Patient's chronic conditions and co-morbidity symptoms are monitored and maintained or improved  7/15/2025 1218 by Zaria Velez RN  Outcome: Completed  Flowsheets (Taken 7/15/2025 0800)  Care Plan - Patient's Chronic Conditions and Co-Morbidity Symptoms are Monitored and Maintained or Improved:   Monitor and assess patient's chronic conditions and comorbid symptoms for stability, deterioration, or improvement   Collaborate with multidisciplinary team to address chronic and comorbid conditions and prevent exacerbation or deterioration   Update acute care plan with appropriate goals if chronic or comorbid symptoms are exacerbated and prevent overall improvement and discharge  7/15/2025 0447 by Aria Lopez,

## 2025-07-15 NOTE — CARE COORDINATION
Patient expects to discharge to: (P) House  Plan for transportation at discharge: (P) Family    Financial    Payor: [facility] MEDICARE / Plan: [facility] AARP MEDICARE ADVANTAGE / Product Type: *No Product type* /     Does insurance require precert for SNF: Yes    Potential assistance Purchasing Medications: (P) No  Meds-to-Beds request: Yes      [facility] [id number] - [place], [place] - [address] - P [phone] - F [phone]  [address]  [place] [address]  Phone: [phone] Fax: [phone]      Notes:    Factors facilitating achievement of predicted outcomes: Family support, Motivated, Cooperative, Pleasant, and Sense of humor    Barriers to discharge: Medical complications    Additional Case Management Notes: from home independent with son, plan is to return denies any needs     Case Management Services Information Letter Provided [x]      The Plan for Transition of Care is related to the following treatment goals of Carotid artery stenosis [I65.29]  Carotid stenosis, left [I65.22]    IF APPLICABLE: The Patient and/or patient representative La and her family were provided with a choice of provider and agrees with the discharge plan. Freedom of choice list with basic dialogue that supports the patient's individualized plan of care/goals and shares the quality data associated with the providers was provided to:     Patient Representative Name:       The Patient and/or Patient Representative Agree with the Discharge Plan?      Viviane Truong RN  Case Management Department  Ph: [phone] Fax: [phone]

## 2025-07-15 NOTE — PROGRESS NOTES
Division of Vascular Surgery        Progress Note          Subjective:     No acute events overnight. Cardene gtt discontinued. Denies numbness, tingling in any extremities. Tolerated dinner last night. Hoping to go home today    Physical Exam:     Vitals:  BP (!) 145/63   Pulse 90   Temp 98.7 °F (37.1 °C) (Oral)   Resp 16   Ht 1.524 m (5')   Wt 82.1 kg (181 lb)   SpO2 95%   BMI 35.35 kg/m²     Physical Exam  Constitutional:       Appearance: Normal appearance.   HENT:      Head: Normocephalic and atraumatic.      Right Ear: External ear normal.      Nose: Nose normal.      Mouth/Throat:      Pharynx: Oropharynx is clear. No oropharyngeal exudate.   Eyes:      Conjunctiva/sclera: Conjunctivae normal.   Neck:      Comments: Bandaid over incision, no hematoma  Cardiovascular:      Rate and Rhythm: Normal rate and regular rhythm.      Pulses: Normal pulses.   Pulmonary:      Effort: Pulmonary effort is normal.   Abdominal:      General: Abdomen is flat. There is no distension.   Musculoskeletal:         General: Normal range of motion.      Cervical back: Normal range of motion. No rigidity.   Skin:     General: Skin is warm and dry.      Capillary Refill: Capillary refill takes less than 2 seconds.   Neurological:      General: No focal deficit present.      Mental Status: She is alert and oriented to person, place, and time.           Assessment and Plan:     POD 1 left carotid endarterectomy  Cardene off, blood pressure controlled  Remove arterial line  Regular diet  Ambulate  Likely discharge home today on ASA     Electronically signed by Rebecca Grant DO on 7/15/25 at 7:49 AM EDT

## 2025-07-15 NOTE — PROGRESS NOTES
Discharge instructions Discharge (written and verbal) instructions provided. All questions answered. Patient demonstrated understanding. All personal belongings accounted for and sent home with the patient.

## 2025-07-15 NOTE — PROGRESS NOTES
0330: Patient refused insulin drip after 0330 blood sugar check that was 153. Patient states \"I know my body best and giving me more insulin will drop my sugar.\" Patient was educated that goal blood sugar is 140- 180 and purpose of the insulin drip is to stay within that range. Patient is agreeable to frequent finger sticks  Resident notified,    Electronically signed by Aria Lopez RN on 7/15/2025 at 4:45 AM

## 2025-07-15 NOTE — DISCHARGE INSTRUCTIONS
Vascular Surgery Patient Discharge Instructions    Operations performed:  left carotid endarterectomy    Discharge Date:  7/15/2025  Discharged To:  Home    WOUND CARE:   Sutures under the skin were used to close your incision. These will dissolve on their own with time.  Steri strips are covering your incision. This will fall off on its own. If still present in 2 weeks, ok to peel it off.    BATHING:  Ok to shower   -No bath tubs, soaks, hot tubs, or swimming until cleared at post-operative office visit.    ACTIVITY:   - Avoid heavy lifting, pushing, pulling, and/or strenuous activity or exercise until cleared at post-operative office visit.    DIET:   Continue regular diet    Discharge Medications:  Take over the counter Ibuprofen or Tylenol as needed for pain control.

## 2025-07-15 NOTE — DISCHARGE SUMMARY
Take 1 tablet by mouth nightly  Qty: 30 tablet, Refills: 2    Associated Diagnoses: Type 2 diabetes mellitus with diabetic nephropathy, without long-term current use of insulin ([facility])      citalopram (CELEXA) 40 MG tablet Take 1 tablet by mouth daily  Qty: 30 tablet, Refills: 2    Associated Diagnoses: Current mild episode of major depressive disorder without prior episode      metFORMIN (GLUCOPHAGE) 1000 MG tablet Take 1 tablet by mouth daily  Qty: 30 tablet, Refills: 2    Associated Diagnoses: Type 2 diabetes mellitus with diabetic nephropathy, without long-term current use of insulin ([facility])      valsartan (DIOVAN) 160 MG tablet Take 1 tablet by mouth daily  Qty: 30 tablet, Refills: 2    Associated Diagnoses: Primary hypertension      empagliflozin (JARDIANCE) 10 MG tablet Take 1 tablet by mouth daily  Qty: 30 tablet, Refills: 3    Associated Diagnoses: Type 2 diabetes mellitus with diabetic nephropathy, without long-term current use of insulin ([facility])      fluticasone (ARNUITY ELLIPTA) 100 MCG/ACT AEPB Inhale 1 puff into the lungs in the morning and at bedtime  Qty: 1 each, Refills: 2      albuterol sulfate HFA (PROVENTIL;VENTOLIN;PROAIR) 108 (90 Base) MCG/ACT inhaler Inhale 2 puffs into the lungs every 6 hours as needed for Wheezing or Shortness of Breath  Qty: 1 each, Refills: 3      sodium bicarbonate 325 MG tablet Take 2 tablets by mouth once      fluticasone (FLONASE) 50 MCG/ACT nasal spray 2 sprays by Nasal route daily  Qty: 1 each, Refills: 11      Latanoprost 0.005 % EMUL latanoprost 0.005 % eye drops      acetaminophen (TYLENOL) 500 MG tablet Take 1 tablet by mouth      aspirin 81 MG tablet Take 1 tablet by mouth daily           STOP taking these medications       vitamin D (CHOLECALCIFEROL) 95083 UNIT CAPS Comments:   Reason for Stopping:             Activity: As tolerated without strenuous activity.  Diet: regular diet    Follow-up with your PCP in 7-10 days. Follow up with Dr. Haley in the Vascular

## 2025-07-15 NOTE — DISCHARGE INSTR - DIET

## 2025-07-16 ENCOUNTER — CARE COORDINATION (OUTPATIENT)
Dept: CARE COORDINATION | Age: 80
End: 2025-07-16

## 2025-07-16 DIAGNOSIS — I65.22 STENOSIS OF LEFT CAROTID ARTERY: Primary | ICD-10-CM

## 2025-07-16 PROCEDURE — 1111F DSCHRG MED/CURRENT MED MERGE: CPT | Performed by: INTERNAL MEDICINE

## 2025-07-16 NOTE — CARE COORDINATION
Care Transitions Note    Initial Call - Call within 2 business days of discharge: Yes    Patient Current Location:  Home: [address] [place] [id number]    Care Transition Nurse contacted the patient by telephone to perform post hospital discharge assessment, verified name and  as identifiers.  Provided introduction to self, and explanation of the Care Transition Nurse role.    Patient: La Collado    Patient : 1945   MRN: [id number]    Reason for Admission: Lt endarterectomy   Discharge Date: 7/15/25  RURS: Readmission Risk Score: 9.5      Last Discharge Facility       Date Complaint Diagnosis Description Type Department Provider    25   Admission (Discharged) [facility] Chris Haley MD            Was this an external facility discharge? No    Additional needs identified to be addressed with provider   No needs identified             Method of communication with provider: none.    Patients top risk factors for readmission: lack of knowledge about disease and medical condition-CHF, HTN, DM, post operative state    Interventions to address risk factors:   Review of patient management of conditions/medications: discharge    Care Summary Note: Was able to contact La for initial transitional outreach.  She went to the hospital for scheduled endarterectomy of the Lt carotid.  She said that she was doing well.  She said that her throat was sore, no s/s of infection to the incision and no neuro deficits.  She stated that she had all her medications and does have post op appointment scheduled.  She had no further questions or concerns.    Care Transition Nurse reviewed discharge instructions with patient. The patient was given an opportunity to ask questions; all questions answered at this time.. The patient verbalized understanding.   Were discharge instructions available to patient? Yes.   Reviewed appropriate site of care based on symptoms and resources available to patient including:

## 2025-07-24 ENCOUNTER — CARE COORDINATION (OUTPATIENT)
Dept: CARE COORDINATION | Age: 80
End: 2025-07-24

## 2025-07-24 NOTE — CARE COORDINATION
Care Transitions Note    Follow Up Call     Attempted to reach patient for transitions of care follow up.  Unable to reach patient.      Outreach Attempts:   HIPAA compliant voicemail left for patient.     Care Summary Note: RN attempted to reach patient for follow up call and to discuss RPM    Follow Up Appointment:   Future Appointments         Provider Specialty Dept Phone    7/31/2025 9:45 AM Chris Haley MD Vascular Surgery [phone]    10/15/2025 1:00 PM Allan Roberts MD Pulmonology [phone]            Plan for follow-up call in 2-5 days based on severity of symptoms and risk factors. Plan for next call: symptom management-surgery recovery  RPM, DM control, HTN control, Medication review, follow up apts.     Virginia Hernandez RN

## 2025-07-31 ENCOUNTER — OFFICE VISIT (OUTPATIENT)
Dept: VASCULAR SURGERY | Age: 80
End: 2025-07-31

## 2025-07-31 ENCOUNTER — CARE COORDINATION (OUTPATIENT)
Dept: CARE COORDINATION | Age: 80
End: 2025-07-31

## 2025-07-31 VITALS
TEMPERATURE: 97.2 F | WEIGHT: 178.2 LBS | DIASTOLIC BLOOD PRESSURE: 62 MMHG | HEART RATE: 91 BPM | OXYGEN SATURATION: 96 % | SYSTOLIC BLOOD PRESSURE: 136 MMHG | HEIGHT: 60 IN | BODY MASS INDEX: 34.99 KG/M2

## 2025-07-31 DIAGNOSIS — Z98.890 HISTORY OF LEFT-SIDED CAROTID ENDARTERECTOMY: ICD-10-CM

## 2025-07-31 DIAGNOSIS — I65.23 CAROTID STENOSIS, ASYMPTOMATIC, BILATERAL: Primary | ICD-10-CM

## 2025-07-31 PROCEDURE — 99024 POSTOP FOLLOW-UP VISIT: CPT | Performed by: SURGERY

## 2025-07-31 NOTE — CARE COORDINATION
Care Transitions Note    Final Call     Attempted to reach patient for transitions of care follow up.  Unable to reach patient.      Outreach Attempts:   Multiple attempts to contact patient at phone numbers on file.     Patient closed (unable to reach patient) from the Care Transitions program on 7/31/25.  Patient/family has the ability to self-manage at this time..      Handoff:   Patient was not referred to the ACM team due to unable to contact patient.      Care Summary Note: RN made second attempt to reach patient and left VM. Also sent MCM.     Assessments:  Care Transitions Subsequent and Final Call    Subsequent and Final Calls  Care Transitions Interventions  Other Interventions:              Upcoming Appointments:    Future Appointments         Provider Specialty Dept Phone    10/15/2025 1:00 PM Allan Roberts MD Pulmonology [phone]    10/30/2025 10:00 AM Chris Haley MD Vascular Surgery [phone]            Virginia Hernandez RN

## 2025-07-31 NOTE — PROGRESS NOTES
Division of Vascular Surgery        Postoperative Follow Up    Left carotid endarterectomy patch angioplasty (7/14/25)    History of Present Illness:      La Collado is a 80 y.o. woman presents for postoperative follow up after undergoing left carotid endarterectomy patch angioplasty for severe asymptomatic carotid disease.  She is doing great, denies any headaches, unilateral weakness, facial droop, thick/slurred speech or symptoms suggestive of amaurosis fugax to suggest stroke or TIA.  Her incision has healed nicely, tongue is midline and she is neurologically  intact.     Physical Exam:     Vitals:  /62 (BP Site: Left Upper Arm, Patient Position: Sitting, BP Cuff Size: Large Adult)   Pulse 91   Temp 97.2 °F (36.2 °C) (Temporal)   Ht 1.524 m (5')   Wt 80.8 kg (178 lb 3.2 oz)   SpO2 96%   BMI 34.80 kg/m²     Well healed left cervical incision  Palpable carotid pulse no bruit  Neurologically intact, tongue midline          Assessment and Plan:     Left carotid endarterectomy  Aspirin and statins daily  Surveillance carotid duplex at 3 months with office visit    Optimal medical management is an important part of overall treatment of all patients with carotid bifurcation disease, regardless of the degree of stenosis or the plan for intervention. This therapy is directed both at the reduction of stroke and overall cardiovascular events, including cardiovascular-related mortality.    Clinical guidelines issued by the American Heart Association and the American Stroke Association recommends:    I. Lowering blood pressure to a target 140/90 mm Hg by lifestyle interventions and antihypertensive treatment is recommended in individuals who have hypertension with asymptomatic carotid atherosclerosis.    II. Glucose control to nearly normoglycemic levels (target hemoglobin A1C 7%) is recommended among diabetic patients to reduce microvascular complications and, with lesser certainty, macrovascular

## 2025-08-26 ENCOUNTER — TELEPHONE (OUTPATIENT)
Dept: PHARMACY | Facility: CLINIC | Age: 80
End: 2025-08-26

## (undated) DEVICE — FIAPC® PROBE W/ FILTER 2200 A OD 2.3MM/6.9FR; L 2.2M/7.2FT: Brand: ERBE

## (undated) DEVICE — SUTURE VICRYL + SZ 3-0 L27IN ABSRB UD L26MM SH 1/2 CIR VCP416H

## (undated) DEVICE — MASTISOL ADHESIVE LIQ 2/3ML

## (undated) DEVICE — DISPOSABLE DISTAL ATTACHMENT: Brand: DISPOSABLE DISTAL ATTACHMENT

## (undated) DEVICE — FORCEPS BX L L240CM DIA2.4MM RAD JAW 4 HOT FOR POLYP DISP

## (undated) DEVICE — SUTURE VICRYL + SZ 4-0 L27IN ABSRB UD L26MM SH 1/2 CIR VCP415H

## (undated) DEVICE — SURGICAL SUCTION CONNECTING TUBE WITH MALE CONNECTOR AND SUCTION CLAMP, 2 FT. LONG (.6 M), 5 MM I.D.: Brand: CONMED

## (undated) DEVICE — TAPE MED W1/8XL30IN WHT POLY

## (undated) DEVICE — GOWN,SIRUS,NONRNF,SETINSLV,XL,20/CS: Brand: MEDLINE

## (undated) DEVICE — CLIPPING DEVICE: Brand: RESOLUTION CLIP

## (undated) DEVICE — BLADE ES ELASTOMERIC COAT INSUL DURABLE BEND UPTO 90DEG

## (undated) DEVICE — Device: Brand: DISPOSABLE ELECTROSURGICAL SNARE

## (undated) DEVICE — SUTURE NONABSORBABLE MONOFILAMENT 7-0 BV-1 1X24 IN PROLENE 8702H

## (undated) DEVICE — Device

## (undated) DEVICE — BLADE,CARBON-STEEL,15,STRL,DISPOSABLE,TB: Brand: MEDLINE

## (undated) DEVICE — SNARE ENDOSCP W10MMXL240CM SHTH DIA24MM RND INSUL STIFF

## (undated) DEVICE — SUTURE PROL SZ 6 0 L30IN NONABSORBABLE BLU C 1 L13MM 3 8 CIR EP8706H

## (undated) DEVICE — PROTECTOR ULN NRV PUR FOAM HK LOOP STRP ANATOMICALLY

## (undated) DEVICE — TAPE UMB 72X7/32 IN

## (undated) DEVICE — TRAP SPEC RETRV CLR PLAS POLYP IN LN SUCT QUIK CTCH

## (undated) DEVICE — DRAPE,UTILITY,XL,4/PK,STERILE: Brand: MEDLINE

## (undated) DEVICE — CATHETER,URETHRAL,REDRUBBER,STERILE,20FR: Brand: MEDLINE

## (undated) DEVICE — SOLUTION IV 500ML 0.9% SOD CHL PH 5 INJ USP VIAFLX PLAS

## (undated) DEVICE — SUBMUCOSAL LIFTING AGENT WITH NEEDLE.: Brand: ORISE™ GEL

## (undated) DEVICE — CONTAINER,SPECIMEN,4OZ,OR STRL: Brand: MEDLINE

## (undated) DEVICE — STRIP,CLOSURE,WOUND,MEDI-STRIP,1/2X4: Brand: MEDLINE

## (undated) DEVICE — DRAPE,THYROID,SOFT,STERILE: Brand: MEDLINE

## (undated) DEVICE — ELECTRODE PT RET AD L9FT HI MOIST COND ADH HYDRGEL CORDED

## (undated) DEVICE — GLOVE SURG SZ 8 CRM LTX FREE POLYISOPRENE POLYMER BEAD ANTI

## (undated) DEVICE — STRAP,POSITIONING,KNEE/BODY,FOAM,4X60": Brand: MEDLINE

## (undated) DEVICE — SUTURE PROL SZ 6-0 L24IN NONABSORBABLE BLU L9.3MM BV-1 3/8 8805H

## (undated) DEVICE — GLOVE SURG SZ 7 CRM LTX FREE POLYISOPRENE POLYMER BEAD ANTI

## (undated) DEVICE — STRAP ARMBRD W1.5XL32IN FOAM STR YET SFT W/ HK AND LOOP

## (undated) DEVICE — GLOVE SURG SZ 75 L12IN FNGR THK79MIL GRN LTX FREE

## (undated) DEVICE — COVER,LIGHT HANDLE,FLX,2/PK: Brand: MEDLINE INDUSTRIES, INC.

## (undated) DEVICE — SUTURE NONABSORBABLE MONOFILAMENT 6-0 C-1 1X30 IN PROLENE 8706H

## (undated) DEVICE — SUTURE N ABSRB L 36 IN SZ 5-0 NDL L 13 MM POLYPRO OR PPL BLU

## (undated) DEVICE — POSITIONER,HEAD,MULTIRING,36CS: Brand: MEDLINE

## (undated) DEVICE — SYRINGE MED 20ML STD CLR PLAS LUERLOCK TIP N CTRL DISP

## (undated) DEVICE — SUTURE PERMAHAND SZ 4-0 L18IN NONABSORBABLE BLK SILK BRAID A183H

## (undated) DEVICE — PROVE COVER: Brand: UNBRANDED

## (undated) DEVICE — SYRINGE MED 10ML LUERLOCK TIP W/O SFTY DISP

## (undated) DEVICE — SINGLE-USE BIOPSY FORCEPS: Brand: RADIAL JAW 4

## (undated) DEVICE — SUTURE MONOCRYL SZ 5-0 L18IN ABSRB UD PC-3 L16MM 3/8 CIR Y844G